# Patient Record
Sex: FEMALE | Race: WHITE | Employment: UNEMPLOYED | URBAN - METROPOLITAN AREA
[De-identification: names, ages, dates, MRNs, and addresses within clinical notes are randomized per-mention and may not be internally consistent; named-entity substitution may affect disease eponyms.]

---

## 2018-04-02 ENCOUNTER — HOSPITAL ENCOUNTER (OUTPATIENT)
Dept: BONE DENSITY | Age: 83
Discharge: HOME | End: 2018-04-02
Attending: FAMILY MEDICINE
Payer: MEDICARE

## 2018-04-02 DIAGNOSIS — M81.0 SENILE OSTEOPOROSIS: ICD-10-CM

## 2018-04-02 DIAGNOSIS — Z78.0 POST-MENOPAUSE: ICD-10-CM

## 2018-04-02 PROCEDURE — 77080 DXA BONE DENSITY AXIAL: CPT

## 2019-03-28 ENCOUNTER — HOSPITAL ENCOUNTER (OUTPATIENT)
Dept: GENERAL RADIOLOGY | Age: 84
Discharge: HOME | End: 2019-03-28
Payer: MEDICARE

## 2019-03-28 DIAGNOSIS — M17.10 ARTHRITIS OF KNEE: ICD-10-CM

## 2019-03-28 PROCEDURE — 73564 X-RAY EXAM KNEE 4 OR MORE: CPT

## 2019-03-31 ENCOUNTER — HOSPITAL ENCOUNTER (INPATIENT)
Age: 84
LOS: 4 days | Discharge: NURSING, REHAB OR LONG-TERM CARE | DRG: 690 | End: 2019-04-05
Attending: EMERGENCY MEDICINE | Admitting: FAMILY MEDICINE
Payer: MEDICARE

## 2019-03-31 DIAGNOSIS — N30.00 ACUTE CYSTITIS WITHOUT HEMATURIA: ICD-10-CM

## 2019-03-31 DIAGNOSIS — T79.6XXA TRAUMATIC RHABDOMYOLYSIS, INITIAL ENCOUNTER (HCC): Primary | ICD-10-CM

## 2019-03-31 LAB
ALBUMIN SERPL-MCNC: 3.2 G/DL (ref 3.4–5)
ALBUMIN/GLOB SERPL: 1 {RATIO} (ref 0.8–1.7)
ALP SERPL-CCNC: 70 U/L (ref 45–117)
ALT SERPL-CCNC: 60 U/L (ref 13–56)
ANION GAP SERPL CALC-SCNC: 9 MMOL/L (ref 3–18)
AST SERPL-CCNC: 173 U/L (ref 15–37)
BASOPHILS # BLD: 0 K/UL (ref 0–0.1)
BASOPHILS NFR BLD: 0 % (ref 0–2)
BILIRUB SERPL-MCNC: 0.8 MG/DL (ref 0.2–1)
BUN SERPL-MCNC: 37 MG/DL (ref 7–18)
BUN/CREAT SERPL: 28 (ref 12–20)
CALCIUM SERPL-MCNC: 8.9 MG/DL (ref 8.5–10.1)
CHLORIDE SERPL-SCNC: 102 MMOL/L (ref 100–108)
CK MB CFR SERPL CALC: 0.7 % (ref 0–4)
CK MB SERPL-MCNC: 41.1 NG/ML (ref 5–25)
CK SERPL-CCNC: 5595 U/L (ref 26–192)
CO2 SERPL-SCNC: 27 MMOL/L (ref 21–32)
CREAT SERPL-MCNC: 1.3 MG/DL (ref 0.6–1.3)
DIFFERENTIAL METHOD BLD: ABNORMAL
EOSINOPHIL # BLD: 0 K/UL (ref 0–0.4)
EOSINOPHIL NFR BLD: 0 % (ref 0–5)
ERYTHROCYTE [DISTWIDTH] IN BLOOD BY AUTOMATED COUNT: 13.1 % (ref 11.6–14.5)
GLOBULIN SER CALC-MCNC: 3.1 G/DL (ref 2–4)
GLUCOSE SERPL-MCNC: 147 MG/DL (ref 74–99)
HCT VFR BLD AUTO: 38.8 % (ref 35–45)
HGB BLD-MCNC: 13.1 G/DL (ref 12–16)
LYMPHOCYTES # BLD: 1.3 K/UL (ref 0.9–3.6)
LYMPHOCYTES NFR BLD: 9 % (ref 21–52)
MCH RBC QN AUTO: 30.8 PG (ref 24–34)
MCHC RBC AUTO-ENTMCNC: 33.8 G/DL (ref 31–37)
MCV RBC AUTO: 91.1 FL (ref 74–97)
MONOCYTES # BLD: 1.2 K/UL (ref 0.05–1.2)
MONOCYTES NFR BLD: 9 % (ref 3–10)
NEUTS SEG # BLD: 11.1 K/UL (ref 1.8–8)
NEUTS SEG NFR BLD: 82 % (ref 40–73)
PLATELET # BLD AUTO: 251 K/UL (ref 135–420)
PMV BLD AUTO: 9.7 FL (ref 9.2–11.8)
POTASSIUM SERPL-SCNC: 3.3 MMOL/L (ref 3.5–5.5)
PROT SERPL-MCNC: 6.3 G/DL (ref 6.4–8.2)
RBC # BLD AUTO: 4.26 M/UL (ref 4.2–5.3)
SODIUM SERPL-SCNC: 138 MMOL/L (ref 136–145)
TROPONIN I SERPL-MCNC: 0.08 NG/ML (ref 0–0.04)
WBC # BLD AUTO: 13.6 K/UL (ref 4.6–13.2)

## 2019-03-31 PROCEDURE — 87086 URINE CULTURE/COLONY COUNT: CPT

## 2019-03-31 PROCEDURE — 87186 SC STD MICRODIL/AGAR DIL: CPT

## 2019-03-31 PROCEDURE — 81001 URINALYSIS AUTO W/SCOPE: CPT

## 2019-03-31 PROCEDURE — 99285 EMERGENCY DEPT VISIT HI MDM: CPT

## 2019-03-31 PROCEDURE — 74011250637 HC RX REV CODE- 250/637: Performed by: EMERGENCY MEDICINE

## 2019-03-31 PROCEDURE — 85025 COMPLETE CBC W/AUTO DIFF WBC: CPT

## 2019-03-31 PROCEDURE — 82550 ASSAY OF CK (CPK): CPT

## 2019-03-31 PROCEDURE — 93005 ELECTROCARDIOGRAM TRACING: CPT

## 2019-03-31 PROCEDURE — 80053 COMPREHEN METABOLIC PANEL: CPT

## 2019-03-31 PROCEDURE — 87077 CULTURE AEROBIC IDENTIFY: CPT

## 2019-03-31 RX ORDER — ACETAMINOPHEN 325 MG/1
650 TABLET ORAL ONCE
Status: COMPLETED | OUTPATIENT
Start: 2019-03-31 | End: 2019-03-31

## 2019-03-31 RX ADMIN — ACETAMINOPHEN 650 MG: 325 TABLET ORAL at 23:43

## 2019-04-01 PROBLEM — M62.82 RHABDOMYOLYSIS: Status: ACTIVE | Noted: 2019-04-01

## 2019-04-01 LAB
APPEARANCE UR: ABNORMAL
ATRIAL RATE: 76 BPM
BACTERIA URNS QL MICRO: ABNORMAL /HPF
BILIRUB UR QL: NEGATIVE
CALCULATED P AXIS, ECG09: 49 DEGREES
CALCULATED R AXIS, ECG10: 57 DEGREES
CALCULATED T AXIS, ECG11: 21 DEGREES
CK MB CFR SERPL CALC: 0.6 % (ref 0–4)
CK MB CFR SERPL CALC: 0.8 % (ref 0–4)
CK MB SERPL-MCNC: 24.6 NG/ML (ref 5–25)
CK MB SERPL-MCNC: 30.8 NG/ML (ref 5–25)
CK SERPL-CCNC: 3276 U/L (ref 26–192)
CK SERPL-CCNC: 5595 U/L (ref 26–192)
COLOR UR: ABNORMAL
DIAGNOSIS, 93000: NORMAL
EPITH CASTS URNS QL MICRO: ABNORMAL /LPF (ref 0–5)
GLUCOSE UR STRIP.AUTO-MCNC: NEGATIVE MG/DL
HGB UR QL STRIP: ABNORMAL
KETONES UR QL STRIP.AUTO: ABNORMAL MG/DL
LACTATE BLD-SCNC: 0.87 MMOL/L (ref 0.4–2)
LACTATE SERPL-SCNC: 1.2 MMOL/L (ref 0.4–2)
LEUKOCYTE ESTERASE UR QL STRIP.AUTO: ABNORMAL
NITRITE UR QL STRIP.AUTO: NEGATIVE
P-R INTERVAL, ECG05: 182 MS
PH UR STRIP: 5.5 [PH] (ref 5–8)
PROT UR STRIP-MCNC: 30 MG/DL
Q-T INTERVAL, ECG07: 370 MS
QRS DURATION, ECG06: 78 MS
QTC CALCULATION (BEZET), ECG08: 416 MS
RBC #/AREA URNS HPF: ABNORMAL /HPF (ref 0–5)
SP GR UR REFRACTOMETRY: 1.03 (ref 1–1.03)
TROPONIN I SERPL-MCNC: 0.08 NG/ML (ref 0–0.04)
TROPONIN I SERPL-MCNC: 0.08 NG/ML (ref 0–0.04)
UROBILINOGEN UR QL STRIP.AUTO: 1 EU/DL (ref 0.2–1)
VENTRICULAR RATE, ECG03: 76 BPM
WBC URNS QL MICRO: ABNORMAL /HPF (ref 0–4)

## 2019-04-01 PROCEDURE — 36415 COLL VENOUS BLD VENIPUNCTURE: CPT

## 2019-04-01 PROCEDURE — 74011250636 HC RX REV CODE- 250/636: Performed by: STUDENT IN AN ORGANIZED HEALTH CARE EDUCATION/TRAINING PROGRAM

## 2019-04-01 PROCEDURE — 65660000000 HC RM CCU STEPDOWN

## 2019-04-01 PROCEDURE — 96361 HYDRATE IV INFUSION ADD-ON: CPT

## 2019-04-01 PROCEDURE — 96374 THER/PROPH/DIAG INJ IV PUSH: CPT

## 2019-04-01 PROCEDURE — 87040 BLOOD CULTURE FOR BACTERIA: CPT

## 2019-04-01 PROCEDURE — 83605 ASSAY OF LACTIC ACID: CPT

## 2019-04-01 PROCEDURE — 74011250636 HC RX REV CODE- 250/636: Performed by: EMERGENCY MEDICINE

## 2019-04-01 PROCEDURE — 74011000250 HC RX REV CODE- 250: Performed by: EMERGENCY MEDICINE

## 2019-04-01 PROCEDURE — 82550 ASSAY OF CK (CPK): CPT

## 2019-04-01 RX ORDER — HEPARIN SODIUM 5000 [USP'U]/ML
5000 INJECTION, SOLUTION INTRAVENOUS; SUBCUTANEOUS EVERY 8 HOURS
Status: DISCONTINUED | OUTPATIENT
Start: 2019-04-01 | End: 2019-04-05 | Stop reason: HOSPADM

## 2019-04-01 RX ORDER — SODIUM CHLORIDE 9 MG/ML
125 INJECTION, SOLUTION INTRAVENOUS CONTINUOUS
Status: DISPENSED | OUTPATIENT
Start: 2019-04-01 | End: 2019-04-02

## 2019-04-01 RX ORDER — ACETAMINOPHEN 325 MG/1
650 TABLET ORAL
Status: DISCONTINUED | OUTPATIENT
Start: 2019-04-01 | End: 2019-04-05 | Stop reason: HOSPADM

## 2019-04-01 RX ORDER — SODIUM CHLORIDE 0.9 % (FLUSH) 0.9 %
5-10 SYRINGE (ML) INJECTION AS NEEDED
Status: DISCONTINUED | OUTPATIENT
Start: 2019-04-01 | End: 2019-04-05 | Stop reason: HOSPADM

## 2019-04-01 RX ADMIN — HEPARIN SODIUM 5000 UNITS: 5000 INJECTION INTRAVENOUS; SUBCUTANEOUS at 17:38

## 2019-04-01 RX ADMIN — SODIUM CHLORIDE 1000 ML: 900 INJECTION, SOLUTION INTRAVENOUS at 04:15

## 2019-04-01 RX ADMIN — WATER 1 G: 1 INJECTION INTRAMUSCULAR; INTRAVENOUS; SUBCUTANEOUS at 01:15

## 2019-04-01 RX ADMIN — SODIUM CHLORIDE 125 ML/HR: 900 INJECTION, SOLUTION INTRAVENOUS at 20:26

## 2019-04-01 RX ADMIN — SODIUM CHLORIDE 1000 ML: 900 INJECTION, SOLUTION INTRAVENOUS at 07:29

## 2019-04-01 RX ADMIN — SODIUM CHLORIDE 448 ML: 900 INJECTION, SOLUTION INTRAVENOUS at 11:24

## 2019-04-01 RX ADMIN — SODIUM CHLORIDE 1000 ML: 900 INJECTION, SOLUTION INTRAVENOUS at 10:19

## 2019-04-01 RX ADMIN — SODIUM CHLORIDE 1000 ML: 900 INJECTION, SOLUTION INTRAVENOUS at 01:00

## 2019-04-01 NOTE — ED NOTES
Patient assisted to bedside commode,  Pt needs major assist with standing. And turning. Pt returned to stretcher after using bedside commode

## 2019-04-01 NOTE — ED PROVIDER NOTES
EMERGENCY DEPARTMENT HISTORY AND PHYSICAL EXAM 
 
9:58 PM 
Date: 3/31/2019 Patient Name: Veronica Crowley History of Presenting Illness Chief Complaint Patient presents with  Fall History Provided By: Patient and Patient's Daughter HPI: Veronica Crowley is a 80 y.o. female with history of hypertension, occasional PVCs, here for frequent falls. Patient had a fall yesterday and was on the ground from 9 AM to 8 PM, was found by her daughter. Patient states that she tripped over a hole in the ground, and had no prior chest pain, shortness of breath, palpitations. Currently she is asymptomatic, and her daughter had given her lots of fluids yesterday so she had been urinating frequently. PCP: Trinh Hobson MD 
No current facility-administered medications on file prior to encounter. Current Outpatient Medications on File Prior to Encounter Medication Sig Dispense Refill  atorvastatin (LIPITOR) 20 mg tablet Take 20 mg by mouth daily.  aspirin delayed-release 81 mg tablet Take  by mouth daily.  raloxifene (EVISTA) 60 mg tablet Take  by mouth daily.  losartan-hydrochlorothiazide (HYZAAR) 50-12.5 mg per tablet Take 1 Tab by mouth daily. Past History Past Medical History: 
Past Medical History:  
Diagnosis Date  Essential hypertension  Hyperlipidemia  Other premature beats 5/13/2013  
  3.  11,437 single ve's, 16 paired. Bigeminy and trigeminy noted. Approximately 15% o pvc's on ekg. asymptomatic potassium normal check echo for lv function Past Surgical History: 
History reviewed. No pertinent surgical history. Family History: 
Family History Problem Relation Age of Onset  Arrhythmia Sister  Heart Attack Other [facility] Heart Surgery Other Social History: 
Social History Tobacco Use  Smoking status: Never Smoker Substance Use Topics  Alcohol use: No  
 Drug use: No  
 
 
Allergies: 
No Known Allergies Review of Systems Constitutional: No fevers. Eyes: No visual symptoms. ENT: No sore throat, runny nose, or ear pain. Respiratory: No cough, dyspnea, or wheezing. Cardiovascular: No chest pain, palpitations, or heaviness. Gastrointestinal: No nausea, vomiting, diarrhea. Genitourinary: No dysuria, frequency, or urgency. Musculoskeletal: No joint pain or swelling. Integumentary: No rashes. Neurological: No headaches, sensory or motor symptoms. All other systems negative. Physical Exam  
 
Patient Vitals for the past 12 hrs: 
 Temp Pulse Resp BP SpO2  
04/01/19 0600  62 16  94 % 04/01/19 0500  75 21 (!) 143/100 96 % 04/01/19 0400  63 17 133/59 94 % 04/01/19 0300  83 19 150/71 93 % 04/01/19 0245  93 24 (!) 131/104 94 % 04/01/19 0230  70 18 136/66 93 % 04/01/19 0215  76 18 128/77 96 % 04/01/19 0200  76 16 129/54 97 % 04/01/19 0115  74 21 145/86 96 % 04/01/19 0100  69 16 (!) 117/94 96 % 04/01/19 0045  75 19 154/68 95 % 04/01/19 0030  77 20 146/74 94 % 04/01/19 0015  75 16 (!) 148/125 96 % 04/01/19 0000    146/66   
03/31/19 2230  75 19 114/89 94 % 03/31/19 2215  81 19 144/73 95 % 03/31/19 2200  82 23 146/64 96 % 03/31/19 2145  78 19 116/61 95 % 03/31/19 2130  80 21 118/74 94 % 03/31/19 2115  81 21 (!) 111/97 95 % 03/31/19 2107 99.1 °F (37.3 °C) 85 24 138/53 96 % Physical Exam  
Constitutional: Appears well-developed. Is not diaphoretic. Eyes: Conjunctiva clear, EOMI Head: Normocephalic and atraumatic. Neck: Normal range of motion. No stridor or tracheal deviation. Cardiovascular: Intact distal pulses. Pulmonary/Chest: Effort normal and no respiratory distress. Abdominal: Non distended. Musculoskeletal: Normal range of motion. Exhibits no tenderness. Neurological: Conversant, alert. Skin: Skin is warm and dry. Psychiatric: Has a normal mood and affect. Behavior is normal.  
Nursing note and vitals reviewed. Diagnostic Study Results Labs - Recent Results (from the past 12 hour(s)) EKG, 12 LEAD, INITIAL Collection Time: 03/31/19 10:03 PM  
Result Value Ref Range Ventricular Rate 76 BPM  
 Atrial Rate 76 BPM  
 P-R Interval 182 ms QRS Duration 78 ms Q-T Interval 370 ms QTC Calculation (Bezet) 416 ms Calculated P Axis 49 degrees Calculated R Axis 57 degrees Calculated T Axis 21 degrees Diagnosis Normal sinus rhythm Possible Left atrial enlargement RSR' or QR pattern in V1 suggests right ventricular conduction delay T wave abnormality, consider anterior ischemia Abnormal ECG When compared with ECG of 25-MAR-2013 12:18, 
Previous ECG has undetermined rhythm, needs review T wave inversion now evident in Anterior leads CBC WITH AUTOMATED DIFF Collection Time: 03/31/19 10:36 PM  
Result Value Ref Range WBC 13.6 (H) 4.6 - 13.2 K/uL  
 RBC 4.26 4.20 - 5.30 M/uL  
 HGB 13.1 12.0 - 16.0 g/dL HCT 38.8 35.0 - 45.0 % MCV 91.1 74.0 - 97.0 FL  
 MCH 30.8 24.0 - 34.0 PG  
 MCHC 33.8 31.0 - 37.0 g/dL  
 RDW 13.1 11.6 - 14.5 % PLATELET 116 520 - 432 K/uL MPV 9.7 9.2 - 11.8 FL  
 NEUTROPHILS 82 (H) 40 - 73 % LYMPHOCYTES 9 (L) 21 - 52 % MONOCYTES 9 3 - 10 % EOSINOPHILS 0 0 - 5 % BASOPHILS 0 0 - 2 %  
 ABS. NEUTROPHILS 11.1 (H) 1.8 - 8.0 K/UL  
 ABS. LYMPHOCYTES 1.3 0.9 - 3.6 K/UL  
 ABS. MONOCYTES 1.2 0.05 - 1.2 K/UL  
 ABS. EOSINOPHILS 0.0 0.0 - 0.4 K/UL  
 ABS. BASOPHILS 0.0 0.0 - 0.1 K/UL  
 DF AUTOMATED METABOLIC PANEL, COMPREHENSIVE Collection Time: 03/31/19 10:36 PM  
Result Value Ref Range Sodium 138 136 - 145 mmol/L Potassium 3.3 (L) 3.5 - 5.5 mmol/L Chloride 102 100 - 108 mmol/L  
 CO2 27 21 - 32 mmol/L Anion gap 9 3.0 - 18 mmol/L Glucose 147 (H) 74 - 99 mg/dL BUN 37 (H) 7.0 - 18 MG/DL Creatinine 1.30 0.6 - 1.3 MG/DL  
 BUN/Creatinine ratio 28 (H) 12 - 20 GFR est AA 47 (L) >60 ml/min/1.73m2 GFR est non-AA 39 (L) >60 ml/min/1.73m2 Calcium 8.9 8.5 - 10.1 MG/DL Bilirubin, total 0.8 0.2 - 1.0 MG/DL  
 ALT (SGPT) 60 (H) 13 - 56 U/L  
 AST (SGOT) 173 (H) 15 - 37 U/L Alk. phosphatase 70 45 - 117 U/L Protein, total 6.3 (L) 6.4 - 8.2 g/dL Albumin 3.2 (L) 3.4 - 5.0 g/dL Globulin 3.1 2.0 - 4.0 g/dL A-G Ratio 1.0 0.8 - 1.7 CARDIAC PANEL,(CK, CKMB & TROPONIN) Collection Time: 03/31/19 10:36 PM  
Result Value Ref Range CK 5,595 (H) 26 - 192 U/L  
 CK - MB 41.1 (H) <3.6 ng/ml CK-MB Index 0.7 0.0 - 4.0 % Troponin-I, QT 0.08 (H) 0.0 - 0.045 NG/ML  
URINALYSIS W/ RFLX MICROSCOPIC Collection Time: 03/31/19 11:53 PM  
Result Value Ref Range Color DARK YELLOW Appearance TURBID Specific gravity 1.030 1.005 - 1.030    
 pH (UA) 5.5 5.0 - 8.0 Protein 30 (A) NEG mg/dL Glucose NEGATIVE  NEG mg/dL Ketone TRACE (A) NEG mg/dL Bilirubin NEGATIVE  NEG Blood MODERATE (A) NEG Urobilinogen 1.0 0.2 - 1.0 EU/dL Nitrites NEGATIVE  NEG Leukocyte Esterase SMALL (A) NEG URINE MICROSCOPIC ONLY Collection Time: 03/31/19 11:53 PM  
Result Value Ref Range WBC 30 to 40 0 - 4 /hpf  
 RBC 5 to 9 0 - 5 /hpf Epithelial cells 2+ 0 - 5 /lpf Bacteria 4+ (A) NEG /hpf CARDIAC PANEL,(CK, CKMB & TROPONIN) Collection Time: 04/01/19  3:00 AM  
Result Value Ref Range CK 5,595 (H) 26 - 192 U/L  
 CK - MB 30.8 (H) <3.6 ng/ml CK-MB Index 0.6 0.0 - 4.0 % Troponin-I, QT 0.08 (H) 0.0 - 0.045 NG/ML Radiologic Studies - No orders to display CT Results  (Last 48 hours) None CXR Results  (Last 48 hours) None Medical Decision Making ED Course: Progress Notes, Reevaluation, and Consults: 
6:40 a. m. patient meeting several sirs criteria now so we will activate sepsis bundle. Pt had already rec ceftriaxone for simple UTI. Will cont to hydrate her rhabdomyolysis with generous fluids. 6:45 AMspoke with Dr. Newton Osman who accepts the patient, will call the resident next. 7 AMspoke with [facility] residents who will come evaluate patient for admission. Nursing staff to update daughter about disposition. Provider Notes (Medical Decision Making): Patient fell couple days ago and was stuck on the ground from 9 AM to 8 PM roughly, and now has rhabdomyolysis with elevated CK and troponin. Despite hydration, repeat was exactly the same, and I have verified with the lab that this is indeed 2 separate lab samples and not the same. Given the lack of response to hydration, we will admit for further hydration and repeat CK. Critical Care Time: Critical Care Time: The services I provided to this patient were to treat and/or prevent clinically significant deterioration that could result in the failure of one or more body systems and/or organ systems due to rhabdomyolysis. Services included the following: 
-reviewing nursing notes and old charts 
-vital sign assessments 
-direct patient care 
-medication orders and management 
-interpreting and reviewing diagnostic studies/labs 
-re-evaluations 
-documentation time Aggregate critical care time was 35  minutes, which includes only time during which I was engaged in work directly related to the patient's care as described above, whether I was at bedside or elsewhere in the Emergency Department. It did not include time spent performing other reported procedures or the services of residents, students, nurses, or advance practice providers. Jolene Menezes MD 
 
6:47 AM 
 
 
Current Facility-Administered Medications Medication Dose Route Frequency Provider Last Rate Last Dose  sodium chloride (NS) flush 5-10 mL  5-10 mL IntraVENous PRN Jolene Menezes MD      
 sodium chloride 0.9 % bolus infusion 1,000 mL  1,000 mL IntraVENous Jodi Le MD      
 Followed by  
Laci Smith sodium chloride 0.9 % bolus infusion 1,000 mL  1,000 mL IntraVENous ONCE Chau Corebtt MD      
 Followed by  
[address] [place] [address] sodium chloride 0.9 % bolus infusion 448 mL  448 mL IntraVENous Cherrie De La Cruz MD      
 
Current Outpatient Medications Medication Sig Dispense Refill  atorvastatin (LIPITOR) 20 mg tablet Take 20 mg by mouth daily.  aspirin delayed-release 81 mg tablet Take  by mouth daily.  raloxifene (EVISTA) 60 mg tablet Take  by mouth daily.  losartan-hydrochlorothiazide (HYZAAR) 50-12.5 mg per tablet Take 1 Tab by mouth daily. Vital Signs-Reviewed the patient's vital signs. EKG: Interpreted by the EP. Time Interpreted: 4790 Rate: 76 Rhythm: Sinus Interpretation: RSR pattern, nonspecific STs,  Records Reviewed: Nursing Notes and Old Medical Records (Time of Review: 9:58 PM) 
-I am the first provider for this patient. 
-I reviewed the vital signs, available nursing notes, past medical history, past surgical history, family history and social history. Diagnosis Clinical Impression: 1. Traumatic rhabdomyolysis, initial encounter ([facility] [address] 2. Acute cystitis without hematuria Disposition: Admit

## 2019-04-01 NOTE — ED TRIAGE NOTES
Patient brought in by medic for repeated falls. Patient alert and orientated,  States she stepped in hole yesterday while hanging out clothes. Pt denies being lightheaded or dizzy,  Pt denies any pain except her usual in her knees. Daughter states pt sent here for bloodwork and x rays

## 2019-04-01 NOTE — PROGRESS NOTES
Reason for Admission:  Rhabdomyolysis [M62.82] RRAT Score:    8 Plan for utilizing home health: To be determined Likelihood of Readmission:   LOW Transition of Care Plan:         
 
 
Initial assessment completed with patient and her daughter Will Safer [phone]. Cognitive status of patient: oriented to time, place, person and situation. Face sheet information confirmed:  yes. The patient designates her daughter Will Safer to participate in her discharge plan and to receive any needed information. This patient lives in a single family home alone. Her daughter Janeth Lagunas lives next door. Patient is able to navigate steps as needed. Prior to hospitalization, patient was considered to be independent with ADLs : yes . If not independent,  patient needs assist with : food preparation and cooking and IADLs Patient has a current ACP document on file: no The patient daughter will be available to transport patient home upon discharge. The patient already has [facility], [facility], and pt's daughter stated she has ordered  Medical alert for pt. Patient is not currently active with home health. Patient has not stayed in a skilled nursing facility or rehab. This patient is on dialysis :no Currently, the discharge plan is to be determined The patient's daughter states that she obtains pt's  medications from the pharmacy, and pt can  take her medications as directed. Patient's current insurance is [employer] Care Management Interventions PCP Verified by CM: Yes 
Palliative Care Criteria Met (RRAT>21 & CHF Dx)?: No 
Mode of Transport at Discharge: Other (see comment) Transition of Care Consult (CM Consult): Discharge Planning Physical Therapy Consult: No 
Occupational Therapy Consult: No 
Speech Therapy Consult: No 
Current Support Network: Lives Alone, Family Lives [place] Confirm Follow Up Transport: Family Plan discussed with Pt/Family/Caregiver: Yes Discharge Location Discharge Placement: Unable to determine at this time Discharge Planning: 
Per pt's daughter she fell at home and could not get up. Pt will need PT/OT eval and Case Management will continue to follow to ensure a safe discharge. YOVANA Elizabeth RN Care Management Pager: [phone]

## 2019-04-01 NOTE — H&P
Admission History and Physical 
[facility] Patient: Ira Thorne MRN: [id number]  [facility]: [id number] YOB: 1931  Age: 80 y.o. Sex: female DOA: 3/31/2019 HPI:  
 
Ira Thorne is a 80 y.o. female with PMH of HTN, osteoarthritis, now presenting with complaint of fall. On saturday, Mrs. Karen Montero reports being outside and falling after tripping in a hole in her yard. She is poor historian when describing the fall. She states she was unable to get up because of pain in her knee due to her osteoarthritis. Mrs. Kaern Montero crawled to her house however was unable to reach a telephone to call anyone. Mrs. Nino daughter called a neighbor when she didn't hear from her mother and the neighbor went to check on her and found the patient down. Mrs. Karen Montero denies any head trauma, headache, neck pain, or back pain. She is usually ambulatory with assistance of a cane which she seldom uses. She denied any chest pain, shortness of breath, or palpitations surrounding the fall. She denies any recent changes to her medications. She is not reporting any significant pain. ED Course: In the ED, EKG was NSR, CK elevated to 5,900 and troponins to 0.08. Repeat labs were unchanged. Patients cbc and cmp otherwise unremarkable. UA concerning for UTI patient started on ceftriaxone. Mrs. Karen Montero was given 4,400 cc fluid in the ED. Review of Systems Constitutional: Negative for fever, chills HENT: Negative for hearing loss and sore throat. Eyes: Negative for blurred vision and double vision. Respiratory: Negative for cough and hemoptysis. Cardiovascular: Negative for chest pain and palpitations. Gastrointestinal: Negative for nausea and vomiting. Genitourinary: Negative for dysuria and hematuria. Musculoskeletal: Positive for pain in her knees bilaterally Skin: Negative for itching and rash. Neurological: Negative for dizziness and headaches. Psychiatric: Negative for depression and suicidal ideas. Past Medical History:  
Diagnosis Date  Essential hypertension  Hyperlipidemia  Other premature beats 5/13/2013  
  3.  11,437 single ve's, 16 paired. Bigeminy and trigeminy noted. Approximately 15% o pvc's on ekg. asymptomatic potassium normal check echo for lv function History reviewed. No pertinent surgical history. Family History Problem Relation Age of Onset  Arrhythmia Sister  Heart Attack Other [facility] Other Social History Socioeconomic History  Marital status:  Spouse name: Not on file  Number of children: Not on file  Years of education: Not on file  Highest education level: Not on file Tobacco Use  Smoking status: Never Smoker Substance and Sexual Activity  Alcohol use: No  
 Drug use: No  
 Sexual activity: Never No Known Allergies Prior to Admission Medications Prescriptions Last Dose Informant Patient Reported? Taking?  
aspirin delayed-release 81 mg tablet   Yes No  
Sig: Take  by mouth daily. atorvastatin (LIPITOR) 20 mg tablet   Yes No  
Sig: Take 20 mg by mouth daily. losartan-hydrochlorothiazide (HYZAAR) 50-12.5 mg per tablet   Yes No  
Sig: Take 1 Tab by mouth daily. raloxifene (EVISTA) 60 mg tablet   Yes No  
Sig: Take  by mouth daily. Facility-Administered Medications: None Physical Exam:  
 
Patient Vitals for the past 24 hrs: 
 Temp Pulse Resp BP SpO2  
04/01/19 0800  62 21 129/79 95 % 04/01/19 0600  62 16  94 % 04/01/19 0500  75 21 (!) 143/100 96 % 04/01/19 0400  63 17 133/59 94 % 04/01/19 0300  83 19 150/71 93 % 04/01/19 0245  93 24 (!) 131/104 94 % 04/01/19 0230  70 18 136/66 93 % 04/01/19 0215  76 18 128/77 96 % 04/01/19 0200  76 16 129/54 97 % 04/01/19 0115  74 21 145/86 96 % 04/01/19 0100  69 16 (!) 117/94 96 % 04/01/19 0045  75 19 154/68 95 % 04/01/19 0030  77 20 146/74 94 % 04/01/19 0015  75 16 (!) 148/125 96 % 04/01/19 0000    146/66   
03/31/19 2230  75 19 114/89 94 % 03/31/19 2215  81 19 144/73 95 % 03/31/19 2200  82 23 146/64 96 % 03/31/19 2145  78 19 116/61 95 % 03/31/19 2130  80 21 118/74 94 % 03/31/19 2115  81 21 (!) 111/97 95 % 03/31/19 2107 99.1 °F (37.3 °C) 85 24 138/53 96 % Physical Exam:  
General:  Alert and Responsive and in No acute distress. HEENT: Conjunctiva pink, sclera anicteric. EOMI. Pharynx moist, nonerythematous. Moist mucous membranes. Thyroid not enlarged, no nodules. No cervical, supraclavicular, or submandibular lymphadenopathy. No cervical spine tenderness, normal ROM in neck CV:  RRR. No murmurs, rubs, or gallops appreciated. RESP:  Unlabored breathing. Lungs clear to auscultation. No wheeze, rales, or rhonchi. ABD:  Soft, nontender, nondistended. Normoactive bowel sounds. No hepatosplenomegaly. MS:  Pain with passive ROM of right knee with flexion, knees non-erythematous, no crepitus, they were not warm Neuro:  5/5 strength bilateral upper extremities and lower extremities. Grossly moving upper and lower extremities, CN 2-12 intact Ext:  No edema. 2+ radial and dp pulses bilaterally. Skin:  No rashes, lesions, or ulcers. Good turgor. IMAGING: No results found. Recent Results (from the past 24 hour(s)) EKG, 12 LEAD, INITIAL Collection Time: 03/31/19 10:03 PM  
Result Value Ref Range Ventricular Rate 76 BPM  
 Atrial Rate 76 BPM  
 P-R Interval 182 ms QRS Duration 78 ms Q-T Interval 370 ms QTC Calculation (Bezet) 416 ms Calculated P Axis 49 degrees Calculated R Axis 57 degrees Calculated T Axis 21 degrees Diagnosis Normal sinus rhythm Possible Left atrial enlargement RSR' or QR pattern in V1 suggests right ventricular conduction delay T wave abnormality, consider anterior ischemia Abnormal ECG 
 When compared with ECG of 25-MAR-2013 12:18, 
Previous ECG has undetermined rhythm, needs review T wave inversion now evident in Anterior leads CBC WITH AUTOMATED DIFF Collection Time: 03/31/19 10:36 PM  
Result Value Ref Range WBC 13.6 (H) 4.6 - 13.2 K/uL  
 RBC 4.26 4.20 - 5.30 M/uL  
 HGB 13.1 12.0 - 16.0 g/dL HCT 38.8 35.0 - 45.0 % MCV 91.1 74.0 - 97.0 FL  
 MCH 30.8 24.0 - 34.0 PG  
 MCHC 33.8 31.0 - 37.0 g/dL  
 RDW 13.1 11.6 - 14.5 % PLATELET 675 484 - 073 K/uL MPV 9.7 9.2 - 11.8 FL  
 NEUTROPHILS 82 (H) 40 - 73 % LYMPHOCYTES 9 (L) 21 - 52 % MONOCYTES 9 3 - 10 % EOSINOPHILS 0 0 - 5 % BASOPHILS 0 0 - 2 %  
 ABS. NEUTROPHILS 11.1 (H) 1.8 - 8.0 K/UL  
 ABS. LYMPHOCYTES 1.3 0.9 - 3.6 K/UL  
 ABS. MONOCYTES 1.2 0.05 - 1.2 K/UL  
 ABS. EOSINOPHILS 0.0 0.0 - 0.4 K/UL  
 ABS. BASOPHILS 0.0 0.0 - 0.1 K/UL  
 DF AUTOMATED METABOLIC PANEL, COMPREHENSIVE Collection Time: 03/31/19 10:36 PM  
Result Value Ref Range Sodium 138 136 - 145 mmol/L Potassium 3.3 (L) 3.5 - 5.5 mmol/L Chloride 102 100 - 108 mmol/L  
 CO2 27 21 - 32 mmol/L Anion gap 9 3.0 - 18 mmol/L Glucose 147 (H) 74 - 99 mg/dL BUN 37 (H) 7.0 - 18 MG/DL Creatinine 1.30 0.6 - 1.3 MG/DL  
 BUN/Creatinine ratio 28 (H) 12 - 20 GFR est AA 47 (L) >60 ml/min/1.73m2 GFR est non-AA 39 (L) >60 ml/min/1.73m2 Calcium 8.9 8.5 - 10.1 MG/DL Bilirubin, total 0.8 0.2 - 1.0 MG/DL  
 ALT (SGPT) 60 (H) 13 - 56 U/L  
 AST (SGOT) 173 (H) 15 - 37 U/L Alk. phosphatase 70 45 - 117 U/L Protein, total 6.3 (L) 6.4 - 8.2 g/dL Albumin 3.2 (L) 3.4 - 5.0 g/dL Globulin 3.1 2.0 - 4.0 g/dL A-G Ratio 1.0 0.8 - 1.7 CARDIAC PANEL,(CK, CKMB & TROPONIN) Collection Time: 03/31/19 10:36 PM  
Result Value Ref Range CK 5,595 (H) 26 - 192 U/L  
 CK - MB 41.1 (H) <3.6 ng/ml CK-MB Index 0.7 0.0 - 4.0 %  Troponin-I, QT 0.08 (H) 0.0 - 0.045 NG/ML  
URINALYSIS W/ RFLX MICROSCOPIC  
 Collection Time: 03/31/19 11:53 PM  
Result Value Ref Range Color DARK YELLOW Appearance TURBID Specific gravity 1.030 1.005 - 1.030    
 pH (UA) 5.5 5.0 - 8.0 Protein 30 (A) NEG mg/dL Glucose NEGATIVE  NEG mg/dL Ketone TRACE (A) NEG mg/dL Bilirubin NEGATIVE  NEG Blood MODERATE (A) NEG Urobilinogen 1.0 0.2 - 1.0 EU/dL Nitrites NEGATIVE  NEG Leukocyte Esterase SMALL (A) NEG URINE MICROSCOPIC ONLY Collection Time: 03/31/19 11:53 PM  
Result Value Ref Range WBC 30 to 40 0 - 4 /hpf  
 RBC 5 to 9 0 - 5 /hpf Epithelial cells 2+ 0 - 5 /lpf Bacteria 4+ (A) NEG /hpf CARDIAC PANEL,(CK, CKMB & TROPONIN) Collection Time: 04/01/19  3:00 AM  
Result Value Ref Range CK 5,595 (H) 26 - 192 U/L  
 CK - MB 30.8 (H) <3.6 ng/ml CK-MB Index 0.6 0.0 - 4.0 % Troponin-I, QT 0.08 (H) 0.0 - 0.045 NG/ML  
POC LACTIC ACID Collection Time: 04/01/19  7:44 AM  
Result Value Ref Range Lactic Acid (POC) 0.87 0.40 - 2.00 mmol/L Assessment/Plan:  
80 y.o. female with PMH HTN, osteoarthritis, now admitted with fall and found down. Rhabdomyolysis s/p fall Patient was down for significant amount of time Friday approximately 9 am to 8 PM following a fall in her yard. Per pt and daughter, knee pain R>L likely cause for pt not being able to lift herself up. EKG in ED NSR negative for STEMI. Elevated CK to 5,595 with slight bump in troponins to 0.08 repeat labs were unchanged. Cr 1.3 on admission. UA positive for 30 protein, mod blood, small LE, and 4+ bacteria. Received approx 4,400 cc in ED. 
- admit to telemetry - trend cardiac panel  
- repeat BMP 
- continue maintenance fluids 125 cc/hr  
- repeat EKG  
- daily cbc, bmp  
- PT/OT, CM  
- fall precautions UTI Received ceftriaxone in ED.  
- continue ceftriaxone, deescalate pending cx  
- FU urine cx  
- daily cbc Hypertension/ HLD 
- hold home losartan-hctz-50-12.5 mf daily  
- hold simvastatin Diet: Renal  
DVT Prophylaxis: Sub Q heparin Code Status: Full Point of Contact: Alison Lloyd (daughter) [phone], EEGX [phone] Disposition and anticipated LOS: 2 midnights Ashley Lyon MD PGY-1 
[facility] Admission History and Physical 
[facility] 
  
  
Patient: Radha Dash MRN: [id number]  CSN: [id number] YOB: 1931  Age: 80 y.o. Sex: female   
  
DOA: 3/31/2019 HPI:  
  
Radha Dash is a 80 y.o. female with PMH arthritis, HTN,HLD now presenting with complaint of a fall. Patient reports that on Saturday she was washing her clothes and then drying them in her house when she fell. She said there was a hole in the ground that she fell into. She did not see it because it was covered. Patient reports she fell straight to the ground in a siting position. Denies any dizziness, blurry vision before the fall. Denies hitting her head, back,legs or arms, LOC, chest pain, SOB or palpitations. She fell in the morning and could not stand up to get into her house so she spent 12 hours until her daughter called a neighbor for help.  
  
Reports pain in her knees due to arthritis and problems walking and standing up.  
  
  
ED Course:  
-cardiac panel CK 5,595 
-CBC WBC 13.6 
-CMP k 3.3 
-Lactic acid 
-U/A leukocyte esterase  
-EKG 
-TYLENOL 
-4 L NS 
  
Review of Systems Constitutional: Negative for fever and malaise/fatigue. HENT: Negative for congestion and sore throat. Eyes: Negative for blurred vision. Respiratory: Negative for cough and shortness of breath. Cardiovascular: Negative for chest pain, palpitations and leg swelling. Gastrointestinal: Negative for abdominal pain, nausea and vomiting. Genitourinary: Negative for dysuria and hematuria. Musculoskeletal: Positive for falls and joint pain. Negative for back pain, myalgias and neck pain. Skin: Negative for rash. Neurological: Negative for dizziness, tremors, loss of consciousness, weakness and headaches.  
  
  
    
Past Medical History:  
Diagnosis Date  Essential hypertension    
 Hyperlipidemia    
 Other premature beats 5/13/2013  
   3.  11,437 single ve's, 16 paired. Bigeminy and trigeminy noted. Approximately 15% o pvc's on ekg. asymptomatic potassium normal check echo for lv function   
  
  
History reviewed. No pertinent surgical history. 
  
     
Family History Problem Relation Age of Onset  Arrhythmia Sister    
 Heart Attack Other    
 Heart Surgery Other    
  
  
Social History  
  
  
     
Socioeconomic History  Marital status:   
    Spouse name: Not on file  Number of children: Not on file  Years of education: Not on file  Highest education level: Not on file Tobacco Use  Smoking status: Never Smoker Substance and Sexual Activity  Alcohol use: No  
 Drug use: No  
 Sexual activity: Never  
  
  
  
No Known Allergies 
  
Prior to Admission Medications Prescriptions Last Dose Informant Patient Reported? Taking?  
aspirin delayed-release 81 mg tablet     Yes No  
Sig: Take  by mouth daily. atorvastatin (LIPITOR) 20 mg tablet     Yes No  
Sig: Take 20 mg by mouth daily. losartan-hydrochlorothiazide (HYZAAR) 50-12.5 mg per tablet     Yes No  
Sig: Take 1 Tab by mouth daily. raloxifene (EVISTA) 60 mg tablet     Yes No  
Sig: Take  by mouth daily. Facility-Administered Medications: None  
  
  
Physical Exam:  
  
Patient Vitals for the past 24 hrs: 
  Temp Pulse Resp BP SpO2  
04/01/19 0600  62 16  94 % 04/01/19 0500  75 21 (!) 143/100 96 % 04/01/19 0400  63 17 133/59 94 % 04/01/19 0300  83 19 150/71 93 % 04/01/19 0245  93 24 (!) 131/104 94 % 04/01/19 0230  70 18 136/66 93 % 04/01/19 0215  76 18 128/77 96 % 04/01/19 0200  76 16 129/54 97 % 04/01/19 0115  74 21 145/86 96 % 04/01/19 0100  69 16 (!) 117/94 96 % 04/01/19 0045  75 19 154/68 95 % 04/01/19 0030  77 20 146/74 94 % 04/01/19 0015  75 16 (!) 148/125 96 % 04/01/19 0000    146/66   
03/31/19 2230  75 19 114/89 94 % 03/31/19 2215  81 19 144/73 95 % 03/31/19 2200  82 23 146/64 96 % 03/31/19 2145  78 19 116/61 95 % 03/31/19 2130  80 21 118/74 94 % 03/31/19 2115  81 21 (!) 111/97 95 % 03/31/19 2107 99.1 °F (37.3 °C) 85 24 138/53 96 %   
  
Physical Exam  
Constitutional: She is oriented to person, place, and time. Vital signs are normal. She appears well-developed and well-nourished. She is cooperative. Non-toxic appearance. HENT:  
Head: Normocephalic and atraumatic. Nose: Nose normal.  
Mouth/Throat: Oropharynx is clear and moist. Mucous membranes are dry. Eyes: Pupils are equal, round, and reactive to light. Conjunctivae and EOM are normal.  
Neck: Normal range of motion and full passive range of motion without pain. No muscular tenderness present. No neck rigidity. Normal range of motion present. Cardiovascular: Normal rate, regular rhythm, S1 normal, S2 normal and normal heart sounds. Exam reveals no gallop. No murmur heard. Pulses: 
     Dorsalis pedis pulses are 2+ on the right side, and 2+ on the left side. Pulmonary/Chest: Effort normal. No respiratory distress. She has no wheezes. She has no rhonchi. She has no rales. Abdominal: Soft. Normal appearance and bowel sounds are normal. There is no tenderness. Musculoskeletal: She exhibits no edema. Right hip: She exhibits normal range of motion. Left hip: She exhibits normal range of motion. Right knee: She exhibits normal range of motion. No tenderness found. Left knee: She exhibits normal range of motion. No tenderness found. Feet:  
Right Foot:  
Skin Integrity: Positive for callus and dry skin. Negative for ulcer. Left Foot:  
Skin Integrity: Positive for callus and dry skin. Negative for ulcer. Lymphadenopathy: Head (right side): No submental, no submandibular, no tonsillar, no preauricular, no posterior auricular and no occipital adenopathy present. Head (left side): No submental, no submandibular, no tonsillar, no preauricular, no posterior auricular and no occipital adenopathy present. Neurological: She is alert and oriented to person, place, and time. She has normal strength. No cranial nerve deficit. Skin: Skin is warm and dry. Psychiatric: Her speech is normal and behavior is normal.  
  
  
IMAGING:  
  
Recent Results Recent Results (from the past 12 hour(s)) EKG, 12 LEAD, INITIAL  
  Collection Time: 03/31/19 10:03 PM  
Result Value Ref Range  
  Ventricular Rate 76 BPM  
  Atrial Rate 76 BPM  
  P-R Interval 182 ms  
  QRS Duration 78 ms  
  Q-T Interval 370 ms  
  QTC Calculation (Bezet) 416 ms  
  Calculated P Axis 49 degrees  
  Calculated R Axis 57 degrees  
  Calculated T Axis 21 degrees  
  Diagnosis      
    Normal sinus rhythm Possible Left atrial enlargement RSR' or QR pattern in V1 suggests right ventricular conduction delay T wave abnormality, consider anterior ischemia Abnormal ECG When compared with ECG of 25-MAR-2013 12:18, 
Previous ECG has undetermined rhythm, needs review T wave inversion now evident in Anterior leads 
   
CBC WITH AUTOMATED DIFF  
  Collection Time: 03/31/19 10:36 PM  
Result Value Ref Range  
  WBC 13.6 (H) 4.6 - 13.2 K/uL  
  RBC 4.26 4.20 - 5.30 M/uL  
  HGB 13.1 12.0 - 16.0 g/dL  
  HCT 38.8 35.0 - 45.0 %  
  MCV 91.1 74.0 - 97.0 FL  
  MCH 30.8 24.0 - 34.0 PG  
  MCHC 33.8 31.0 - 37.0 g/dL  
  RDW 13.1 11.6 - 14.5 %  
  PLATELET 712 013 - 245 K/uL  
  MPV 9.7 9.2 - 11.8 FL  
  NEUTROPHILS 82 (H) 40 - 73 %  
  LYMPHOCYTES 9 (L) 21 - 52 %  
  MONOCYTES 9 3 - 10 %  
  EOSINOPHILS 0 0 - 5 %  
  BASOPHILS 0 0 - 2 %  
  ABS. NEUTROPHILS 11.1 (H) 1.8 - 8.0 K/UL  
  ABS. LYMPHOCYTES 1.3 0.9 - 3.6 K/UL  
  ABS. MONOCYTES 1.2 0.05 - 1.2 K/UL   ABS. EOSINOPHILS 0.0 0.0 - 0.4 K/UL  
  ABS. BASOPHILS 0.0 0.0 - 0.1 K/UL  
  DF AUTOMATED METABOLIC PANEL, COMPREHENSIVE  
  Collection Time: 03/31/19 10:36 PM  
Result Value Ref Range  
  Sodium 138 136 - 145 mmol/L  
  Potassium 3.3 (L) 3.5 - 5.5 mmol/L  
  Chloride 102 100 - 108 mmol/L  
  CO2 27 21 - 32 mmol/L  
  Anion gap 9 3.0 - 18 mmol/L  
  Glucose 147 (H) 74 - 99 mg/dL  
  BUN 37 (H) 7.0 - 18 MG/DL  
  Creatinine 1.30 0.6 - 1.3 MG/DL  
  BUN/Creatinine ratio 28 (H) 12 - 20    
  GFR est AA 47 (L) >60 ml/min/1.73m2  
  GFR est non-AA 39 (L) >60 ml/min/1.73m2  
  Calcium 8.9 8.5 - 10.1 MG/DL  
  Bilirubin, total 0.8 0.2 - 1.0 MG/DL  
  ALT (SGPT) 60 (H) 13 - 56 U/L  
  AST (SGOT) 173 (H) 15 - 37 U/L  
  Alk. phosphatase 70 45 - 117 U/L  
  Protein, total 6.3 (L) 6.4 - 8.2 g/dL  
  Albumin 3.2 (L) 3.4 - 5.0 g/dL  
  Globulin 3.1 2.0 - 4.0 g/dL  
  A-G Ratio 1.0 0.8 - 1.7 CARDIAC PANEL,(CK, CKMB & TROPONIN)  
  Collection Time: 03/31/19 10:36 PM  
Result Value Ref Range  
  CK 5,595 (H) 26 - 192 U/L  
  CK - MB 41.1 (H) <3.6 ng/ml  
  CK-MB Index 0.7 0.0 - 4.0 %  
  Troponin-I, QT 0.08 (H) 0.0 - 0.045 NG/ML  
URINALYSIS W/ RFLX MICROSCOPIC  
  Collection Time: 03/31/19 11:53 PM  
Result Value Ref Range  
  Color DARK YELLOW    
  Appearance TURBID    
  Specific gravity 1.030 1.005 - 1.030    
  pH (UA) 5.5 5.0 - 8.0    
  Protein 30 (A) NEG mg/dL  
  Glucose NEGATIVE  NEG mg/dL  
  Ketone TRACE (A) NEG mg/dL  
  Bilirubin NEGATIVE  NEG    
  Blood MODERATE (A) NEG    
  Urobilinogen 1.0 0.2 - 1.0 EU/dL  
  Nitrites NEGATIVE  NEG    
  Leukocyte Esterase SMALL (A) NEG URINE MICROSCOPIC ONLY  
  Collection Time: 03/31/19 11:53 PM  
Result Value Ref Range  
  WBC 30 to 40 0 - 4 /hpf  
  RBC 5 to 9 0 - 5 /hpf  
  Epithelial cells 2+ 0 - 5 /lpf  
  Bacteria 4+ (A) NEG /hpf CARDIAC PANEL,(CK, CKMB & TROPONIN)  
  Collection Time: 04/01/19  3:00 AM  
Result Value Ref Range   CK 5,595 (H) 26 - 192 U/L  
  CK - MB 30.8 (H) <3.6 ng/ml  
  CK-MB Index 0.6 0.0 - 4.0 %  
  Troponin-I, QT 0.08 (H) 0.0 - 0.045 NG/ML  
POC LACTIC ACID  
  Collection Time: 04/01/19  7:44 AM  
Result Value Ref Range  
  Lactic Acid (POC) 0.87 0.40 - 2.00 mmol/L  
  
  
  
Assessment/Plan:  
80 y.o. female with PMH arthritis, HTN,HLD, now admitted with rhabdomyolysis. 
  
Rhabdomyolysis: On admission her CK was elevated to 5,595 on two separate occassions. Myoglobinuria present in the U/A. Received 4 L NS in the ER. DDX: prolonged immobilization,marked exertion, drugs, infections, electrolyte disorders.  
-admit to medical floor  
-trend CK 
-Monitor CBC, BMP 
-125 NS mL/hr 
-PT/OT/CM 
-Fall precautions  
  
Acute cystitis: U/A showed small Leukocyte esterase, WBC 30-40, bacteria 4+. -ceftriaxone 1g given one 
-Continue ceftriaxone  
-f/u urine culture  
  
*See interns note for chronic management.  
Won Barth MD, PGY-2 [facility]

## 2019-04-02 ENCOUNTER — APPOINTMENT (OUTPATIENT)
Dept: GENERAL RADIOLOGY | Age: 84
DRG: 690 | End: 2019-04-02
Attending: STUDENT IN AN ORGANIZED HEALTH CARE EDUCATION/TRAINING PROGRAM
Payer: MEDICARE

## 2019-04-02 LAB
ANION GAP SERPL CALC-SCNC: 8 MMOL/L (ref 3–18)
BUN SERPL-MCNC: 13 MG/DL (ref 7–18)
BUN/CREAT SERPL: 23 (ref 12–20)
CALCIUM SERPL-MCNC: 7.6 MG/DL (ref 8.5–10.1)
CHLORIDE SERPL-SCNC: 111 MMOL/L (ref 100–108)
CK MB CFR SERPL CALC: 0.4 % (ref 0–4)
CK MB CFR SERPL CALC: 0.5 % (ref 0–4)
CK MB SERPL-MCNC: 12.5 NG/ML (ref 5–25)
CK MB SERPL-MCNC: 8.4 NG/ML (ref 5–25)
CK SERPL-CCNC: 1999 U/L (ref 26–192)
CK SERPL-CCNC: 2424 U/L (ref 26–192)
CO2 SERPL-SCNC: 24 MMOL/L (ref 21–32)
CREAT SERPL-MCNC: 0.57 MG/DL (ref 0.6–1.3)
ERYTHROCYTE [DISTWIDTH] IN BLOOD BY AUTOMATED COUNT: 13.2 % (ref 11.6–14.5)
GLUCOSE SERPL-MCNC: 112 MG/DL (ref 74–99)
HCT VFR BLD AUTO: 35.5 % (ref 35–45)
HGB BLD-MCNC: 11.9 G/DL (ref 12–16)
MAGNESIUM SERPL-MCNC: 1.8 MG/DL (ref 1.6–2.6)
MCH RBC QN AUTO: 30.6 PG (ref 24–34)
MCHC RBC AUTO-ENTMCNC: 33.5 G/DL (ref 31–37)
MCV RBC AUTO: 91.3 FL (ref 74–97)
PLATELET # BLD AUTO: 203 K/UL (ref 135–420)
PMV BLD AUTO: 9.9 FL (ref 9.2–11.8)
POTASSIUM SERPL-SCNC: 3.2 MMOL/L (ref 3.5–5.5)
POTASSIUM SERPL-SCNC: 3.9 MMOL/L (ref 3.5–5.5)
POTASSIUM SERPL-SCNC: 3.9 MMOL/L (ref 3.5–5.5)
RBC # BLD AUTO: 3.89 M/UL (ref 4.2–5.3)
SODIUM SERPL-SCNC: 143 MMOL/L (ref 136–145)
TROPONIN I SERPL-MCNC: 0.03 NG/ML (ref 0–0.04)
TROPONIN I SERPL-MCNC: 0.06 NG/ML (ref 0–0.04)
WBC # BLD AUTO: 11 K/UL (ref 4.6–13.2)

## 2019-04-02 PROCEDURE — 84132 ASSAY OF SERUM POTASSIUM: CPT

## 2019-04-02 PROCEDURE — 74011000250 HC RX REV CODE- 250: Performed by: STUDENT IN AN ORGANIZED HEALTH CARE EDUCATION/TRAINING PROGRAM

## 2019-04-02 PROCEDURE — 74011250636 HC RX REV CODE- 250/636: Performed by: STUDENT IN AN ORGANIZED HEALTH CARE EDUCATION/TRAINING PROGRAM

## 2019-04-02 PROCEDURE — 97165 OT EVAL LOW COMPLEX 30 MIN: CPT

## 2019-04-02 PROCEDURE — 97530 THERAPEUTIC ACTIVITIES: CPT

## 2019-04-02 PROCEDURE — 74011250637 HC RX REV CODE- 250/637: Performed by: STUDENT IN AN ORGANIZED HEALTH CARE EDUCATION/TRAINING PROGRAM

## 2019-04-02 PROCEDURE — 65660000000 HC RM CCU STEPDOWN

## 2019-04-02 PROCEDURE — 73564 X-RAY EXAM KNEE 4 OR MORE: CPT

## 2019-04-02 PROCEDURE — 72100 X-RAY EXAM L-S SPINE 2/3 VWS: CPT

## 2019-04-02 PROCEDURE — 85027 COMPLETE CBC AUTOMATED: CPT

## 2019-04-02 PROCEDURE — 82550 ASSAY OF CK (CPK): CPT

## 2019-04-02 PROCEDURE — 97535 SELF CARE MNGMENT TRAINING: CPT

## 2019-04-02 PROCEDURE — 83735 ASSAY OF MAGNESIUM: CPT

## 2019-04-02 PROCEDURE — 97162 PT EVAL MOD COMPLEX 30 MIN: CPT

## 2019-04-02 PROCEDURE — 36415 COLL VENOUS BLD VENIPUNCTURE: CPT

## 2019-04-02 RX ORDER — DICLOFENAC SODIUM 10 MG/G
4 GEL TOPICAL
Status: DISCONTINUED | OUTPATIENT
Start: 2019-04-02 | End: 2019-04-05 | Stop reason: HOSPADM

## 2019-04-02 RX ORDER — POTASSIUM CHLORIDE 20 MEQ/1
20 TABLET, EXTENDED RELEASE ORAL
Status: COMPLETED | OUTPATIENT
Start: 2019-04-02 | End: 2019-04-02

## 2019-04-02 RX ORDER — POTASSIUM CHLORIDE 1.5 G/1.77G
20 POWDER, FOR SOLUTION ORAL ONCE
Status: ACTIVE | OUTPATIENT
Start: 2019-04-02 | End: 2019-04-03

## 2019-04-02 RX ADMIN — POTASSIUM CHLORIDE 20 MEQ: 20 TABLET, EXTENDED RELEASE ORAL at 11:55

## 2019-04-02 RX ADMIN — CEFTRIAXONE SODIUM 1 G: 1 INJECTION, POWDER, FOR SOLUTION INTRAMUSCULAR; INTRAVENOUS at 01:45

## 2019-04-02 RX ADMIN — SODIUM CHLORIDE 125 ML/HR: 900 INJECTION, SOLUTION INTRAVENOUS at 00:07

## 2019-04-02 RX ADMIN — POTASSIUM CHLORIDE 20 MEQ: 20 TABLET, EXTENDED RELEASE ORAL at 08:25

## 2019-04-02 RX ADMIN — SODIUM CHLORIDE 125 ML/HR: 900 INJECTION, SOLUTION INTRAVENOUS at 09:45

## 2019-04-02 RX ADMIN — HEPARIN SODIUM 5000 UNITS: 5000 INJECTION INTRAVENOUS; SUBCUTANEOUS at 08:26

## 2019-04-02 RX ADMIN — POTASSIUM CHLORIDE 20 MEQ: 20 TABLET, EXTENDED RELEASE ORAL at 13:51

## 2019-04-02 RX ADMIN — POTASSIUM CHLORIDE 20 MEQ: 20 TABLET, EXTENDED RELEASE ORAL at 10:12

## 2019-04-02 RX ADMIN — HEPARIN SODIUM 5000 UNITS: 5000 INJECTION INTRAVENOUS; SUBCUTANEOUS at 01:51

## 2019-04-02 NOTE — ROUTINE PROCESS
Bedside and Verbal shift change report given to Luisa Grace RN 
 (oncoming nurse) by Richard Rothman RN (offgoing nurse). Report included the following information SBAR, Kardex, Intake/Output, MAR and Recent Results.

## 2019-04-02 NOTE — PROGRESS NOTES
Problem: Mobility Impaired (Adult and Pediatric) Goal: *Acute Goals and Plan of Care (Insert Text) Description Physical Therapy Goals Initiated 4/2/2019 and to be accomplished within 7 day(s) 1. Patient will move from supine to sit and sit to supine , scoot up and down and roll side to side in bed with supervision/set-up. 2.  Patient will transfer from bed to chair and chair to bed with minimal assistance/contact guard assist using the least restrictive device. 3.  Patient will perform sit to stand with minimal assistance/contact guard assist. 
4.  Patient will ambulate with minimal assistance/contact guard assist for 50 feet with the least restrictive device. 5.  Patient will ascend/descend 4 stairs with 1-2 handrail(s) with moderate assistance . Outcome: Progressing Towards Goal 
 PHYSICAL THERAPY EVALUATION Patient: Frank Krishna (89 y.o. female) Date: 4/2/2019 Primary Diagnosis: Rhabdomyolysis [M62.82] Rhabdomyolysis [M62.82] Precautions:   Fall ASSESSMENT : 
PT orders received and patient cleared by nursing to participate with therapy. Patient is a 80 y.o. female admitted to the hospital due to a fall. Patient consents to PT evaluation and treatment. Pt lives alone but next door to her daughter. Pt was outside hanging laundry and tripped on a hole. She was unable to stand up and crawled into her home. She was not able to reach the telephone and was on the floor/ground until her neighbor checked on her (about 9 hours down per notes). Pt was independent with mobility including gait no AD. Pt's neighbor/friend is present during therapy session. Performed supine to sit minimal assistance. Sit to stands moderate/maximum assistance. She needs cues for rocking technique and hand placement. Gait bed to chair 3 feet RW moderate assistance. Pt bears little weight to R LE at this time and complains mostly of R foot/big toe pain.  Nursing informed of pain. Pt does have a history of gout and has had a recent fall. Pt ended therapy sitting in recliner with all needs met. Nursing Julianne Billingsley) stated pt does not need a chair alarm at this time but is informed of pt in the chair. Patient will benefit from skilled intervention to address the above impairments and increase functional independence. Patient?s rehabilitation potential is considered to be Good Factors which may influence rehabilitation potential include:  
? None noted ? Mental ability/status ? Medical condition ? Home/family situation and support systems ? Safety awareness 
? Pain tolerance/management 
? Other: PLAN : 
Recommendations and Planned Interventions: 
?           Bed Mobility Training             ? Neuromuscular Re-Education ? Transfer Training                   ? Orthotic/Prosthetic Training 
? Gait Training                          ? Modalities ? Therapeutic Exercises          ? Edema Management/Control ? Therapeutic Activities            ? Patient and Family Training/Education ? Other (comment): Frequency/Duration: Patient will be followed by physical therapy 1-2 times per day to address goals. Discharge Recommendations: Rehab Further Equipment Recommendations for Discharge: rolling walker SUBJECTIVE:  
Patient stated ? 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 OBJECTIVE DATA SUMMARY:  
 
Past Medical History:  
Diagnosis Date Essential hypertension Hyperlipidemia Other premature beats 5/13/2013  
  3.  11,437 single ve's, 16 paired. Bigeminy and trigeminy noted. Approximately 15% o pvc's on ekg. asymptomatic potassium normal check echo for lv function History reviewed. No pertinent surgical history. Barriers to Learning/Limitations: None Compensate with: N/A 
 Prior Level of Function/Home Situation: Independent with mobility including gait no AD. Home Situation Home Environment: Private residence # Steps to Enter: 4 Rails to Enter: Yes Hand Rails : Bilateral 
One/Two Story Residence: Two story, live on 1st floor Living Alone: Yes Support Systems: (daughter lives next door and neighbors assist prn) Patient Expects to be Discharged to[de-identified] Private residence Current DME Used/Available at Home: [place], straight, [employer] [place] Critical Behavior: 
Neurologic State: Alert Psychosocial 
Patient Behaviors: Calm; Cooperative Family  Behaviors: Calm; Cooperative Strength:   
Strength: Generally decreased, functional(B LE) Tone & Sensation:  
Tone: Normal(B LE) Sensation: Intact(B LE) Range Of Motion: 
AROM: Generally decreased, functional(B LE) Functional Mobility: 
Bed Mobility: 
Supine to Sit: Minimum assistance Transfers: 
Sit to Stand: Moderate assistance;Maximum assistance Stand to Sit: Moderate assistance Balance:  
Sitting: Intact Standing: Impaired; With support Standing - Static: Fair Standing - Dynamic : Poor Ambulation/Gait Training: 
Distance (ft): 3 Feet (ft) Assistive Device: Walker, rolling Ambulation - Level of Assistance: Moderate assistance Base of Support: Center of gravity altered;Shift to left Speed/Marva: Slow Therapeutic Exercises:  
Reviewed and performed ankle pumps. Also reviewed heel slides and LAQ for knee ROM due to arthritis. Pain: 
Pre: mild/moderate B knees and R foot/toe Post: moderate B knees and R foot/toe Activity Tolerance:  
fair Please refer to the flowsheet for vital signs taken during this treatment. After treatment:  
? Patient left in no apparent distress sitting up in chair ? Patient left sitting on EOB ? Patient left in no apparent distress in bed ? Patient declined to be OOB at this time due to   
? Call bell left within reach ? Nursing notified(Leydi) ? Caregiver present ? Bed alarm activated ? Personal items in reach COMMUNICATION/EDUCATION:  
?         Fall prevention education was provided and the patient/caregiver indicated understanding. ? Patient/family have participated as able in goal setting and plan of care. ?         Patient/family agree to work toward stated goals and plan of care. ?         Patient understands intent and goals of therapy, but is neutral about his/her participation. ? Patient is unable to participate in goal setting and plan of care. ? Role of physical therapy. ?         Out of bed with nursing assistance 3-5 times a day. Thank you for this referral. 
Rita Pompa, PT, DPT Time Calculation: 33 mins Eval Complexity: History: MEDIUM  Complexity : 1-2 comorbidities / personal factors will impact the outcome/ POC Exam:HIGH Complexity : 4+ Standardized tests and measures addressing body structure, function, activity limitation and / or participation in recreation  Presentation: MEDIUM Complexity : Evolving with changing characteristics  Clinical Decision Making:Medium Complexity    Overall Complexity:MEDIUM

## 2019-04-02 NOTE — PROGRESS NOTES
Intern Progress Note [address] Patient: Merlin Marion MRN: [id number]  CSN: [id number] YOB: 1931  Age: 80 y.o. Sex: female DOA: 3/31/2019 LOS:  LOS: 1 day   PCP: Areli Barker MD  
             
Subjective:  
 
Acute events: No acute events overnight. Patient states she feels well and wants to go home. Spoke with patient about labs trending down, and will be evaluated by PT/OT today for recommendations. Brief ROS:  
General: feeling well, denies N/V  
CV: denies chest pain Chest: denies SOB 
MSK: reports knee pain bilaterally Objective:  
  
Patient Vitals for the past 24 hrs: 
 Temp Pulse Resp BP SpO2  
04/02/19 0814 99.3 °F (37.4 °C) 77 16 137/44 94 % 04/02/19 0431 99.2 °F (37.3 °C) 82 17 150/74 94 % 04/02/19 0008 99.1 °F (37.3 °C) 74 18 156/74 95 % 04/01/19 2022 99.3 °F (37.4 °C) 82 20 152/73 97 % 04/01/19 1606 98.8 °F (37.1 °C) 79 20 146/75 99 % 04/01/19 1207 98.2 °F (36.8 °C) 74 21 150/78 98 % 04/01/19 1052 97.8 °F (36.6 °C) 73 20 148/74 97 % Physical Exam:  
General: sitting in bedside chair, healthy, alert, smiling and in no apparent distress Cardiovascular: RRR w/o MRGs Respiratory: CTAB no rales, rhonchi, wheezes Abdomen: Soft, +BS, non-tendeder, Non-Distended Extremities: no peripheral edema, knees without erythema, warmth, no crepitus to palpation Neuro: Cranial nerves grossly intact, grossly moving upper and lower extremities Skin: Negative for lesions, ulcers, rashes Lab/Data Reviewed: All lab results for the last 24 hours reviewed. CBC w/Diff Recent Labs 04/02/19 
0257 03/31/19 
2236 WBC 11.0 13.6*  
RBC 3.89* 4.26  
HGB 11.9* 13.1 HCT 35.5 38.8  251 GRANS  --  82* LYMPH  --  9* EOS  --  0 Chemistry Recent Labs 04/02/19 
0257 03/31/19 2236 * 147*  138  
K 3.2* 3.3*  
* 102 CO2 24 27 BUN 13 37* CREA 0.57* 1.30 CA 7.6* 8.9 MG 1.8  --   
 AGAP 8 9 BUCR 23* 28* AP  --  70  
TP  --  6.3* ALB  --  3.2*  
GLOB  --  3.1 AGRAT  --  1.0 Microbiology No results for input(s): SDES, CULT in the last 72 hours. No results for input(s): CULT in the last 72 hours. I/O Intake/Output Summary (Last 24 hours) at 4/2/2019 3877 Last data filed at 4/2/2019 3695 Gross per 24 hour Intake 3213.75 ml Output 1850 ml Net 1363.75 ml Scheduled Medications Reviewed: 
Current Facility-Administered Medications Medication Dose Route Frequency  potassium chloride (K-DUR, KLOR-CON) SR tablet 20 mEq  20 mEq Oral Q2H  
 heparin (porcine) injection 5,000 Units  5,000 Units SubCUTAneous Q8H  
 0.9% sodium chloride infusion  125 mL/hr IntraVENous CONTINUOUS  
 cefTRIAXone (ROCEPHIN) 1 g in sterile water (preservative free) 10 mL IV syringe  1 g IntraVENous Q24H Assessment/Plan  
 
80 y.o. female with PMH HTN, osteoarthritis, now admitted with fall and found down.  
  
Rhabdomyolysis s/p fall. Patient was down for significant amount of time 3/30 approximately 9 am to 8 PM following a fall in her yard. Per pt and daughter, knee pain R>L likely cause for pt not being able to lift herself up. EKG in ED NSR negative for STEMI. Elevated CK to 5,595 with slight bump in troponins to 0.08 repeat labs were unchanged. Cr 1.3 on admission. UA positive for 30 protein, mod blood, small LE, and 4+ bacteria. Received approx 4,400 cc in ED. CK trended to 2,000s overnight, troponins down to 0.06. Patient feeling well, denies chest pain. - telemetry - trend cardiac panel  
- repeat EKG  
- continue maintenance fluids 125 cc/hr  
- daily cbc, bmp  
- PT/OT, CM recommendations  
- fall precautions  
  
UTI Started ceftriaxone in ED.  
- continue ceftriaxone, deescalate pending cx  
- FU urine cx  
- daily cbc  
  
Hypertension/ HLD 
- hold home losartan-hctz-50-12.5 mf daily  
- hold simvastatin  
  
  
Diet: Renal  
DVT Prophylaxis: Sub Q heparin Code Status: Full Point of Contact: Arnaud Nuñez (daughter) [phone], [facility] [phone] 
  
Disposition and anticipated LOS: 2 midnights  
  
Elle Lewis MD PGY-1 
[facility] Elle Lewis MD PGY-1 
4/2/2019, 8:34 AM

## 2019-04-02 NOTE — PROGRESS NOTES
Discharge Planning:   
Chart reviewed. PT recommending rehab. Notified  Cindy of ARU. NUNO ChanceN RN Care Management Pager: [phone]

## 2019-04-02 NOTE — PROGRESS NOTES
Problem: Self Care Deficits Care Plan (Adult) Goal: *Acute Goals and Plan of Care (Insert Text) Description Occupational Therapy Goals Initiated 4/2/2019 within 7 day(s). 1.  Patient will perform lower body dressing with minimal assistance/contact guard assist  
2. Patient will perform toileting with minimal assistance/contact guard assist. 
3.  Patient will perform toilet transfer with minimal assistance/contact guard assist. 
4.  Patient will perform bed mobility with supervision to participate in self-care tasks. 5.  Patient will participate in upper extremity therapeutic exercise/activities with supervision/set-up for 8 minutes. Outcome: Progressing Towards Goal 
 OCCUPATIONAL THERAPY EVALUATION Patient: Valeria Vegas (23 y.o. female) Date: 4/2/2019 Primary Diagnosis: Rhabdomyolysis [M62.82] Rhabdomyolysis [M62.82] Precautions: Fall PLOF: Pt was (I) with basic self-care/ADLs and IADLs, including cooking, cleaning, laundry, grocery shopping, medication management, driving, taking care of her three cats, and crocheting PTA. She did not require AD for functional mobility. ASSESSMENT : 
Pt cleared to participate in OT evaluation by RN. Upon entering room, pt seated in [facility], alert, and agreeable to participate in OT evaluation. Based on the objective data described below, the patient presents with increased pain in RLE, decreased strength, decreased endurance, decreased functional balance, and decreased functional mobility, affecting her performance in basic self-care/ADL tasks. Pt requires mod assist x2 to participate in [facility] to bed SPT with verbal cues for correct hand placement on BSC to RW. Educated pt to feel for surface of the bed before attempting to sit down on bed for safe transfers. While sitting EOB, pt presents BUEs AROM and coordination to be [facility], however strength is generally decreased.  Pt demonstrates she is able to reach down to B ankles, but has difficulty donning B socks at this time. Due to patient's decreased standing balance, pt requires mod assist for LB dressing. Issued pt a red theraband to increase UB strength for dressing tasks. Educated pt on the role of OT, evaluation process, TherEx, and goals for therapy with pt demonstrating good understanding. The pt will benefit from further OT services, in order to maximize her ADL performance and decrease the risk for complications associated with decreased functional activity. At the end of the session, pt required mod assist for supine-sit, with max assist x2 to scoot to [facility]. Call bell in reach, bed alarm set, with all needs met. Patient will benefit from skilled intervention to address the above impairments. Patient's rehabilitation potential is considered to be Good Factors which may influence rehabilitation potential include: ? None noted ? Mental ability/status ? Medical condition ? Home/family situation and support systems ? Safety awareness ? Pain tolerance/management ? Other: PLAN : 
Recommendations and Planned Interventions:  
?               Self Care Training                  ? Therapeutic Activities ? Functional Mobility Training   ? Cognitive Retraining 
? Therapeutic Exercises           ? Endurance Activities ? Balance Training                    ? Neuromuscular Re-Education ? Visual/Perceptual Training     ? Home Safety Training 
? Patient Education                   ? Family Training/Education ? Other (comment): Frequency/Duration: Patient will be followed by occupational therapy 1-2 times per day/4-7 days per week to address goals. Discharge Recommendations: Inpatient Rehab Further Equipment Recommendations for Discharge: BSC, Rw, shower chair SUBJECTIVE:  
 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  
 
Past Medical History:  
Diagnosis Date  Essential hypertension  Hyperlipidemia  Other premature beats 5/13/2013  
  3.  11,437 single ve's, 16 paired. Bigeminy and trigeminy noted. Approximately 15% o pvc's on ekg. asymptomatic potassium normal check echo for lv function History reviewed. No pertinent surgical history. Barriers to Learning/Limitations: None Compensate with: visual, verbal, tactile, kinesthetic cues/model Home Situation: Pt reports she lives alone next to her daughter. She was (I) with ADLs and IADLs, including cooking, cleaning, laundry, grocery shopping, medication management, driving, and taking care of her three cats PTA. Home Situation Home Environment: Private residence # Steps to Enter: 4 Rails to Enter: Yes Hand Rails : Bilateral 
One/Two Story Residence: Two story, live on 1st floor Living Alone: Yes Support Systems: (daughter lives next door and neighbors assist prn) Patient Expects to be Discharged to[de-identified] Private residence Current DME Used/Available at Home: [employer], straight, [employer] Tub or Shower Type: Tub/Shower combination(Also has access to a walk-in shower; Pt reports she sponge bathes) ? Right hand dominant   ? Left hand dominant Cognitive/Behavioral Status: 
Neurologic State: Alert Orientation Level: Oriented X4 Cognition: Follows commands Safety/Judgement: Fall prevention Skin: Slight bruising on RUE Edema: None noted on BUEs Vision/Perceptual:   
Acuity: Within Defined Limits(without glasses) Coordination: BUE Coordination: Within functional limits Fine Motor Skills-Upper: Left Intact; Right Intact Gross Motor Skills-Upper: Left Intact; Right Intact Balance: 
Sitting: Intact Standing: Impaired; With support Standing - Static: Fair Standing - Dynamic : Poor Strength: BUE Strength: Generally decreased, functional 
 
 
Tone & Sensation: BUE 
 Tone: Normal 
Sensation: Intact Range of Motion: BUE 
AROM: Within functional limits Functional Mobility and Transfers for ADLs: 
Bed Mobility: 
Supine to Sit: Minimum assistance Sit to Supine: Moderate assistance Transfers: 
Sit to Stand: Moderate assistance;Assist x2 Toilet Transfer : Moderate assistance;Assist x2(BSC > bed SPT) ADL Assessment:  
Feeding: Setup Oral Facial Hygiene/Grooming: Setup Bathing: Minimum assistance Upper Body Dressing: Stand-by assistance Lower Body Dressing: Moderate assistance;Assist x2(Unable to reach past B ankles; Decreased standing balance to don LB clothing over hips) Toileting: Moderate assistance;Assist x2 ADL Intervention: Lower Body Dressing Assistance Dressing Assistance: Minimum assistance Position Performed: Seated edge of bed Toileting Toileting Assistance: Moderate assistance(x2; BSC > bed SPT) Cognitive Retraining Safety/Judgement: Fall prevention Therapeutic Exercise: 
Issued pt a red theraband. Pt was able to perform 10 repetitions of shoulder horizontal abduction and 10 reps of R/L bicep curls to increase UB strength in preparation for dressing tasks. Pain: 
Pain reports pain in R LE during movement, which has been occurring since her fall last Saturday. However, pt did not report a number on the pain level scale. Activity Tolerance:  
Fair Please refer to the flowsheet for vital signs taken during this treatment. After treatment:  
? Patient left in no apparent distress sitting up in chair ? Patient left in no apparent distress in bed 
? Call bell left within reach ? Nursing notified ? Nursing student present ? Bed alarm activated COMMUNICATION/EDUCATION:  
? Role of Occupational Therapy in the acute care setting 
? Home safety education was provided and the patient/caregiver indicated understanding. ? Patient/family have participated as able in goal setting and plan of care. ? Patient/family agree to work toward stated goals and plan of care. ? Patient understands intent and goals of therapy, but is neutral about his/her participation. ? Patient is unable to participate in goal setting and plan of care. Thank you for this referral. 
Marvell Dakins, OT Time Calculation: 26 mins Eval Complexity: History: MEDIUM Complexity : Expanded review of history including physical, cognitive and psychosocial  history ; Examination: LOW Complexity : 1-3 performance deficits relating to physical, cognitive , or psychosocial skils that result in activity limitations and / or participation restrictions ; Decision Making:LOW Complexity : No comorbidities that affect functional and no verbal or physical assistance needed to complete eval tasks

## 2019-04-02 NOTE — ROUTINE PROCESS
Bedside and Verbal shift change report given to STEVO Chaudhari  by Graham Cody. JOEY Anderson . Report included the following information SBAR, Kardex, Intake/Output, MAR and Recent Results.

## 2019-04-03 LAB
ANION GAP SERPL CALC-SCNC: 6 MMOL/L (ref 3–18)
BUN SERPL-MCNC: 11 MG/DL (ref 7–18)
BUN/CREAT SERPL: 19 (ref 12–20)
CALCIUM SERPL-MCNC: 7.7 MG/DL (ref 8.5–10.1)
CHLORIDE SERPL-SCNC: 111 MMOL/L (ref 100–108)
CK MB CFR SERPL CALC: 0.3 % (ref 0–4)
CK MB SERPL-MCNC: 4.2 NG/ML (ref 5–25)
CK SERPL-CCNC: 1213 U/L (ref 26–192)
CO2 SERPL-SCNC: 24 MMOL/L (ref 21–32)
CREAT SERPL-MCNC: 0.57 MG/DL (ref 0.6–1.3)
ERYTHROCYTE [DISTWIDTH] IN BLOOD BY AUTOMATED COUNT: 13.2 % (ref 11.6–14.5)
GLUCOSE SERPL-MCNC: 114 MG/DL (ref 74–99)
HCT VFR BLD AUTO: 32.1 % (ref 35–45)
HGB BLD-MCNC: 10.7 G/DL (ref 12–16)
MCH RBC QN AUTO: 30.6 PG (ref 24–34)
MCHC RBC AUTO-ENTMCNC: 33.3 G/DL (ref 31–37)
MCV RBC AUTO: 91.7 FL (ref 74–97)
PLATELET # BLD AUTO: 198 K/UL (ref 135–420)
PMV BLD AUTO: 10.2 FL (ref 9.2–11.8)
POTASSIUM SERPL-SCNC: 3.6 MMOL/L (ref 3.5–5.5)
RBC # BLD AUTO: 3.5 M/UL (ref 4.2–5.3)
SODIUM SERPL-SCNC: 141 MMOL/L (ref 136–145)
TROPONIN I SERPL-MCNC: 0.05 NG/ML (ref 0–0.04)
WBC # BLD AUTO: 8.6 K/UL (ref 4.6–13.2)

## 2019-04-03 PROCEDURE — 97535 SELF CARE MNGMENT TRAINING: CPT

## 2019-04-03 PROCEDURE — 65660000000 HC RM CCU STEPDOWN

## 2019-04-03 PROCEDURE — 97116 GAIT TRAINING THERAPY: CPT

## 2019-04-03 PROCEDURE — 80048 BASIC METABOLIC PNL TOTAL CA: CPT

## 2019-04-03 PROCEDURE — 36415 COLL VENOUS BLD VENIPUNCTURE: CPT

## 2019-04-03 PROCEDURE — 82550 ASSAY OF CK (CPK): CPT

## 2019-04-03 PROCEDURE — 97530 THERAPEUTIC ACTIVITIES: CPT

## 2019-04-03 PROCEDURE — 74011250637 HC RX REV CODE- 250/637: Performed by: STUDENT IN AN ORGANIZED HEALTH CARE EDUCATION/TRAINING PROGRAM

## 2019-04-03 PROCEDURE — 85027 COMPLETE CBC AUTOMATED: CPT

## 2019-04-03 PROCEDURE — 93005 ELECTROCARDIOGRAM TRACING: CPT

## 2019-04-03 PROCEDURE — 74011250636 HC RX REV CODE- 250/636: Performed by: STUDENT IN AN ORGANIZED HEALTH CARE EDUCATION/TRAINING PROGRAM

## 2019-04-03 PROCEDURE — 74011000250 HC RX REV CODE- 250: Performed by: STUDENT IN AN ORGANIZED HEALTH CARE EDUCATION/TRAINING PROGRAM

## 2019-04-03 RX ADMIN — HEPARIN SODIUM 5000 UNITS: 5000 INJECTION INTRAVENOUS; SUBCUTANEOUS at 01:02

## 2019-04-03 RX ADMIN — CEFTRIAXONE SODIUM 1 G: 1 INJECTION, POWDER, FOR SOLUTION INTRAMUSCULAR; INTRAVENOUS at 01:02

## 2019-04-03 RX ADMIN — DICLOFENAC 4 G: 10 GEL TOPICAL at 17:27

## 2019-04-03 NOTE — PROGRESS NOTES
Problem: Self Care Deficits Care Plan (Adult) Goal: *Acute Goals and Plan of Care (Insert Text) Description Occupational Therapy Goals Initiated 4/2/2019 within 7 day(s). 1.  Patient will perform lower body dressing with minimal assistance/contact guard assist  
2. Patient will perform toileting with minimal assistance/contact guard assist. 
3.  Patient will perform toilet transfer with minimal assistance/contact guard assist. 
4.  Patient will perform bed mobility with supervision to participate in self-care tasks. 5.  Patient will participate in upper extremity therapeutic exercise/activities with supervision/set-up for 8 minutes. Outcome: Progressing Towards Goal 
 OCCUPATIONAL THERAPY TREATMENT Patient: Sander Ewing (10 y.o. female) Date: 4/3/2019 Diagnosis: Rhabdomyolysis [M62.82] Rhabdomyolysis [M62.82] Rhabdomyolysis Precautions: Fall PLOF: Pt was (I) with basic self-care/ADLs and IADLs, including cooking, cleaning, laundry, grocery shopping, medication management, driving, taking care of her three cats, and crocheting PTA. She did not require AD for functional mobility. Chart, occupational therapy assessment, plan of care, and goals were reviewed. ASSESSMENT: 
Pt is in the recliner upon entry, reports she has been sitting up for a couple of hours, requesting to reposition. Pt educated on role of OT in acute setting and on the importance of participating in OT sessions to improve strength and endurance and increase independence with ADLs. Pt verbalized understanding. Pt is seen with PT to increase safety of the pt and staff during functional mobility and ADLs. Pt required CGA to don robe and Min A to simulate donning socks while seated in the chair. Pt participated in functional txfr to bed w/FWW (simulating txfr to the [facility] or toilet) requiring Mod A x2 to std and Min A to perform functional txfr. Pt required Mod Ax2 to return to sup c/o pain in back and R knee.  Pt educated on and performed BUE TherEx w/Red Thera-band shd horizontal abd, flexion x10 each. Educated pt and pt's daughter on performing the TherEx 3x times a day to improve strength and endurance for carryover w/ADLs. Due to pt's PLOF of being independent in all ADLs and IADLs, patient would benefit from intensive acute rehab for strengthening in order to improve functional mobility and participation in self care tasks. Progression toward goals: 
?          Improving appropriately and progressing toward goals ? Improving slowly and progressing toward goals ? Not making progress toward goals and plan of care will be adjusted PLAN: 
Patient continues to benefit from skilled intervention to address the above impairments. Continue treatment per established plan of care. Discharge Recommendations:  Rehab Further Equipment Recommendations for Discharge:  N/A  
 
SUBJECTIVE:  
Patient stated ? I am not in a good shape today. ? OBJECTIVE DATA SUMMARY:  
Cognitive/Behavioral Status: 
Neurologic State: Alert Orientation Level: Oriented X4 Cognition: Follows commands Safety/Judgement: Fall prevention Functional Mobility and Transfers for ADLs: 
Bed Mobility: 
  
  
Sit to Supine: Moderate assistance;Assist x2 Transfers: 
Sit to Stand: Moderate assistance;Assist x2 Toilet Transfer : Moderate assistance;Assist x2(simulated) Balance: 
Sitting: Intact Standing: Impaired; With support Standing - Static: Fair Standing - Dynamic : Fair ADL Intervention: 
 Grooming Grooming Assistance: Supervision(seated ) Washing Face: Supervision/set-up Lower Body Dressing Assistance Socks: Minimum assistance(simulated) UE Therapeutic Exercises: BUE TherEx w/Red resistive Theraband Pain: 
Pain level pre-treatment: didn't rate Pain level post-treatment: didn't rate Activity Tolerance:   
Fair Please refer to the flowsheet for vital signs taken during this treatment. After treatment:  
?  Patient left in no apparent distress sitting up in chair ? Patient left in no apparent distress in bed 
? Call bell left within reach ? Nursing notified ? Caregiver present ? Bed alarm activated COMMUNICATION/EDUCATION:  
? Role of Occupational Therapy in the acute care setting 
? Home safety education was provided and the patient/caregiver indicated understanding. ? Patient/family have participated as able in working towards goals and plan of care. ? Patient/family agree to work toward stated goals and plan of care. ? Patient understands intent and goals of therapy, but is neutral about his/her participation. ? Patient is unable to participate in goal setting and plan of care. Thank you for this referral. 
EDITH Shea Time Calculation: 25 mins

## 2019-04-03 NOTE — PROGRESS NOTES
Bedside and Verbal shift change report given to Simi Cage (oncoming nurse) by Louise Caro RN 
 (offgoing nurse). Report given with SBAR, Dimas, MAR and Recent Results.

## 2019-04-03 NOTE — PROGRESS NOTES
Paged [address] regarding decrease in H&H and plt 
 
[phone] Dr Nicole Darden returned paged new order to hold heparin apply SCD

## 2019-04-03 NOTE — PROGRESS NOTES
Intern Progress Note [address] Patient: Selene Felder MRN: [id number]  CSN: [id number] YOB: 1931  Age: 80 y.o. Sex: female DOA: 3/31/2019 LOS:  LOS: 2 days   PCP: Josh Madrigal MD  
             
Subjective:  
 
Acute events: No acute events overnight. Patient states she needed to use bathroom frequently overnight. Still having pain with movement of left knee. Pt states Dr. Phu Alonzo visited her yesterday. Spoke about rehab which pt agreeable to. Brief ROS:  
General: feeling well, denies N/V  
CV: denies chest pain Chest: denies SOB  
MSK: right knee and hip pain Objective:  
  
Patient Vitals for the past 24 hrs: 
 Temp Pulse Resp BP SpO2  
04/03/19 0755 97.8 °F (36.6 °C) 83 17 123/73 93 % 04/03/19 0410 99.4 °F (37.4 °C) 90 16 159/77 93 % 04/03/19 0058 99.5 °F (37.5 °C) 84 17 152/77 94 % 04/02/19 2102 98.2 °F (36.8 °C) 82 20 144/77 96 % 04/02/19 1600 99.6 °F (37.6 °C) 84 20 145/78 96 % 04/02/19 1155 98.6 °F (37 °C) 74 20 138/73 95 % Physical Exam:  
General: awake, in bed, healthy, alert, smiling and in no apparent distress Cardiovascular: RRR w/o MRGs Respiratory: CTAB no rales, rhonchi, wheezes Abdomen: Soft, +BS, non-tendeder, Non-Distended Extremities: some pain in right hip with passive flexion of right knee, no peripheral edema, knees without erythema, warmth, no crepitus to palpation, pt still having trouble lifting right leg off bed, she is able to lift left leg Neuro: Cranial nerves grossly intact, grossly moving upper and lower extremities Skin: Negative for lesions, ulcers, rashes Lab/Data Reviewed: All lab results for the last 24 hours reviewed. CBC w/Diff Recent Labs 04/03/19 
0440 04/02/19 
0257 03/31/19 
2236 WBC 8.6 11.0 13.6*  
RBC 3.50* 3.89* 4.26  
HGB 10.7* 11.9* 13.1 HCT 32.1* 35.5 38.8  203 251 GRANS  --   --  82* LYMPH  --   --  9* EOS  --   --  0 Chemistry Recent Labs 04/03/19 
1853 04/02/19 
1741 04/02/19 
0257 03/31/19 
2236 *  --  112* 147*   --  143 138  
K 3.6 3.9  3.9 3.2* 3.3*  
*  --  111* 102 CO2 24  --  24 27 BUN 11  --  13 37* CREA 0.57*  --  0.57* 1.30 CA 7.7*  --  7.6* 8.9 MG  --   --  1.8  --   
AGAP 6  --  8 9 BUCR 19  --  23* 28* AP  --   --   --  70  
TP  --   --   --  6.3* ALB  --   --   --  3.2*  
GLOB  --   --   --  3.1 AGRAT  --   --   --  1.0 Microbiology Recent Labs 04/01/19 
0730 04/01/19 
0715 03/31/19 
2345 CULT NO GROWTH 2 DAYS NO GROWTH 2 DAYS >100,000 COLONIES/mL GRAM NEGATIVE RODS*  >100,000 COLONIES/mL POSSIBLE STREPTOCOCCI, ALPHA HEMOLYTIC* Recent Labs 04/01/19 
0730 04/01/19 
0715 03/31/19 
2345 CULT NO GROWTH 2 DAYS NO GROWTH 2 DAYS >100,000 COLONIES/mL GRAM NEGATIVE RODS*  >100,000 COLONIES/mL POSSIBLE STREPTOCOCCI, ALPHA HEMOLYTIC* I/O Intake/Output Summary (Last 24 hours) at 4/3/2019 1313 Last data filed at 4/3/2019 9991 Gross per 24 hour Intake 390 ml Output 650 ml Net -260 ml Scheduled Medications Reviewed: 
Current Facility-Administered Medications Medication Dose Route Frequency  heparin (porcine) injection 5,000 Units  5,000 Units SubCUTAneous Q8H  
 cefTRIAXone (ROCEPHIN) 1 g in sterile water (preservative free) 10 mL IV syringe  1 g IntraVENous Q24H Assessment/Plan  
 
80 y.o. female with PMH HTN, osteoarthritis, now admitted with fall and found down.  
  
Rhabdomyolysis/Fall  
s/p fall. Patient was down for significant amount of time 3/30 approximately 9 am to 8 PM following a fall in her yard. Per pt and daughter, knee pain R>L likely cause for pt not being able to lift herself up. EKG in ED NSR negative for STEMI. Elevated CK to 5,595 with slight bump in troponins to 0.08 repeat labs were unchanged. Cr 1.3 on admission. UA positive for 30 protein, mod blood, small LE, and 4+ bacteria.  Received approx 4,400 cc in ED. CK trended to 1200. troponins down to 0.06>0.03>0. 05. Patient feeling well, denies chest pain. X ray right knee, lumbar spine without acute fracture. R hip pain with knee flexion may indicate hip etiology for her pain with walking.   
- telemetry - R hip radiograph  
- daily cbc, bmp  
- fluids d/c  
- PT/OT, CM recommendations- recommends rehab  
- fall precautions UTI Started ceftriaxone in ED. Ucx positive for GNR, beta hemolytic strep, sensitivities pending. Bcx NGTD 
- continue ceftriaxone, deescalate pending cx 
- FU urine cx  
- daily cbc  
  
Hypertension/ HLD 
- hold home losartan-hctz-50-12.5 mf daily  
- hold simvastatin  
  
  
Diet: Renal  
DVT Prophylaxis: Sub Q heparin Code Status: Full Point of Contact: Keerthi Walter (daughter) [phone], GNSTY [phone] 
  
Disposition and anticipated LOS: 2 midnights  
  
Tika Lamb MD PGY-1 
4/3/2019, 8:34 AM

## 2019-04-03 NOTE — PROGRESS NOTES
Problem: Mobility Impaired (Adult and Pediatric) Goal: *Acute Goals and Plan of Care (Insert Text) Description Physical Therapy Goals Initiated 4/2/2019 and to be accomplished within 7 day(s) 1. Patient will move from supine to sit and sit to supine , scoot up and down and roll side to side in bed with supervision/set-up. 2.  Patient will transfer from bed to chair and chair to bed with minimal assistance/contact guard assist using the least restrictive device. 3.  Patient will perform sit to stand with minimal assistance/contact guard assist. 
4.  Patient will ambulate with minimal assistance/contact guard assist for 50 feet with the least restrictive device. 5.  Patient will ascend/descend 4 stairs with 1-2 handrail(s) with moderate assistance . Outcome: Progressing Towards Goal 
 PHYSICAL THERAPY TREATMENT Patient: Trino Monterroso (1 y.o. female) Date: 4/3/2019 Diagnosis: Rhabdomyolysis [M62.82] Rhabdomyolysis [M62.82] Rhabdomyolysis Precautions: Fall Chart, physical therapy assessment, plan of care and goals were reviewed. OBJECTIVE/ ASSESSMENT: 
Patient found seated in recliner willing to work with PT w/ encouragement. Tx performed w/ OT to maximize safety of pt and staff. Pt voices difficulty w/ transfers 2/2 to general weakness. Prior to mobility, daughter present for tx whom is very involved and helpful w/ tx in learning to assist her mom. Pt req cueing for alignment for sit <> stands and sequencing w/ RW. Pt req mod A X2 to stand, however once proper posture obtained, pt able to maintain stability at a min A to CGA level. Pt amb from chair to bed. Pt req cueing for controlled descent to sit as pt displayed poor ecc control. Pt returned to supine displaying good core stability req assistance for (B) LEs. Pt left in room w/ all needs in reach and notified nurse in room.  Pt is motivated to improve functionally as pt was fully independent prior to admission. Pt tends to be self limiting voicing increased difficulty w/ increased fatigue. Pt would greatly benefit from further therapy in an inpatient rehab setting. Education: 
?         Bed mobility ? Transfers ? Ambulation / gait ? Assistive device management ? Stairs ? Body mechanics ? Position change ? Therapeutic exercise ? Activity pacing / energy conservation ? Other: 
 
Progression toward goals: 
?      Improving appropriately and progressing toward goals ? Improving slowly and progressing toward goals ? Not making progress toward goals and plan of care will be adjusted PLAN: 
Patient continues to benefit from skilled intervention to address the above impairments. Continue treatment per established plan of care. Discharge Recommendations:  Inpatient Rehab Further Equipment Recommendations for Discharge:  rolling walker, does not have rolling, has standard which is not most appropriate SUBJECTIVE:  
Patient stated ? It's just not a good time, I feel weak, but I'll do what I can.? OBJECTIVE DATA SUMMARY:  
Critical Behavior: 
Neurologic State: Alert, Eyes open spontaneously Orientation Level: Oriented X4 Cognition: Appropriate decision making, Follows commands Safety/Judgement: Fall prevention Functional Mobility Training: 
Bed Mobility: 
Sit to Supine: Moderate assistance;Assist x2 Transfers: 
Sit to Stand: Moderate assistance;Assist x2 Stand to Sit: Moderate assistance;Assist x2 Balance: 
Sitting: Intact Standing: Impaired; With support Standing - Static: Fair Standing - Dynamic : Fair Ambulation/Gait Training: 
Distance (ft): 6 Feet (ft) Assistive Device: Walker, rolling Ambulation - Level of Assistance: Minimal assistance;Contact guard assistance;Assist x2 Gait Abnormalities: Decreased step clearance Base of Support: Center of gravity altered Speed/Marva: Slow Step Length: Left shortened;Right shortened Therapeutic Exercises:  
 
 
EXERCISE Sets Reps Active Active Assist  
Passive Self- assited ROM Comments Ankle Pumps 1 10 ? ? ? ?   
Quad Sets   ? ? ? ? Glut Sets   ? ? ? ? Short Arc Quads   ? ? ? ? Heel Slides   ? ? ? ? Straight Leg Raises   ? ? ? ? Hip Abd   ? ? ? ? Long Arc Quads   ? ? ? ? Seated Marching   ? ? ? ? Seated Knee Flexion   ? ? ? ? Standing Marching   ? ? ? ? Pain: 0/10 Activity Tolerance:  
Good Please refer to the flowsheet for vital signs taken during this treatment. After treatment:  
? Patient left in no apparent distress sitting up in chair ? Patient left in no apparent distress in bed 
? Call bell left within reach ? Nursing notified ? Caregiver present ? Bed alarm activated ? SCDs applied ? Ice applied Karine Aguiar PTA Time Calculation: 23 mins

## 2019-04-03 NOTE — ROUTINE PROCESS
Bedside and Verbal shift change report given to JOEY Olvera (oncoming nurse) by Dylan Cabrera (offgoing nurse). Report included the following information SBAR, Kardex and MAR.

## 2019-04-03 NOTE — ROUTINE PROCESS
Bedside and Verbal shift change report given to JOEY Adame (oncoming nurse) by Crystal Can (offgoing nurse). Report included the following information SBAR, Kardex and MAR. Patient had no acute events overnight. Currently resting in NAD. No express needs reported at this time.

## 2019-04-04 ENCOUNTER — APPOINTMENT (OUTPATIENT)
Dept: GENERAL RADIOLOGY | Age: 84
DRG: 690 | End: 2019-04-04
Attending: STUDENT IN AN ORGANIZED HEALTH CARE EDUCATION/TRAINING PROGRAM
Payer: MEDICARE

## 2019-04-04 LAB
ANION GAP SERPL CALC-SCNC: 6 MMOL/L (ref 3–18)
ATRIAL RATE: 81 BPM
BUN SERPL-MCNC: 11 MG/DL (ref 7–18)
BUN/CREAT SERPL: 19 (ref 12–20)
CALCIUM SERPL-MCNC: 7.8 MG/DL (ref 8.5–10.1)
CALCULATED P AXIS, ECG09: 68 DEGREES
CALCULATED R AXIS, ECG10: 64 DEGREES
CALCULATED T AXIS, ECG11: 40 DEGREES
CHLORIDE SERPL-SCNC: 110 MMOL/L (ref 100–108)
CO2 SERPL-SCNC: 25 MMOL/L (ref 21–32)
CREAT SERPL-MCNC: 0.58 MG/DL (ref 0.6–1.3)
DIAGNOSIS, 93000: NORMAL
ERYTHROCYTE [DISTWIDTH] IN BLOOD BY AUTOMATED COUNT: 13.4 % (ref 11.6–14.5)
GLUCOSE SERPL-MCNC: 121 MG/DL (ref 74–99)
HCT VFR BLD AUTO: 33 % (ref 35–45)
HGB BLD-MCNC: 11.1 G/DL (ref 12–16)
MCH RBC QN AUTO: 31 PG (ref 24–34)
MCHC RBC AUTO-ENTMCNC: 33.6 G/DL (ref 31–37)
MCV RBC AUTO: 92.2 FL (ref 74–97)
P-R INTERVAL, ECG05: 170 MS
PLATELET # BLD AUTO: 219 K/UL (ref 135–420)
PMV BLD AUTO: 10.4 FL (ref 9.2–11.8)
POTASSIUM SERPL-SCNC: 3.4 MMOL/L (ref 3.5–5.5)
POTASSIUM SERPL-SCNC: 4.1 MMOL/L (ref 3.5–5.5)
Q-T INTERVAL, ECG07: 382 MS
QRS DURATION, ECG06: 80 MS
QTC CALCULATION (BEZET), ECG08: 443 MS
RBC # BLD AUTO: 3.58 M/UL (ref 4.2–5.3)
SODIUM SERPL-SCNC: 141 MMOL/L (ref 136–145)
VENTRICULAR RATE, ECG03: 81 BPM
WBC # BLD AUTO: 9.8 K/UL (ref 4.6–13.2)

## 2019-04-04 PROCEDURE — 74011250636 HC RX REV CODE- 250/636: Performed by: STUDENT IN AN ORGANIZED HEALTH CARE EDUCATION/TRAINING PROGRAM

## 2019-04-04 PROCEDURE — 97530 THERAPEUTIC ACTIVITIES: CPT

## 2019-04-04 PROCEDURE — 97535 SELF CARE MNGMENT TRAINING: CPT

## 2019-04-04 PROCEDURE — 85027 COMPLETE CBC AUTOMATED: CPT

## 2019-04-04 PROCEDURE — 97116 GAIT TRAINING THERAPY: CPT

## 2019-04-04 PROCEDURE — 36415 COLL VENOUS BLD VENIPUNCTURE: CPT

## 2019-04-04 PROCEDURE — 73502 X-RAY EXAM HIP UNI 2-3 VIEWS: CPT

## 2019-04-04 PROCEDURE — 74011250637 HC RX REV CODE- 250/637: Performed by: STUDENT IN AN ORGANIZED HEALTH CARE EDUCATION/TRAINING PROGRAM

## 2019-04-04 PROCEDURE — 65660000000 HC RM CCU STEPDOWN

## 2019-04-04 PROCEDURE — 74011000250 HC RX REV CODE- 250: Performed by: STUDENT IN AN ORGANIZED HEALTH CARE EDUCATION/TRAINING PROGRAM

## 2019-04-04 PROCEDURE — 84132 ASSAY OF SERUM POTASSIUM: CPT

## 2019-04-04 RX ORDER — NITROFURANTOIN MACROCRYSTALS 50 MG/1
100 CAPSULE ORAL 2 TIMES DAILY
Status: DISCONTINUED | OUTPATIENT
Start: 2019-04-04 | End: 2019-04-04

## 2019-04-04 RX ORDER — NITROFURANTOIN MACROCRYSTALS 50 MG/1
50 CAPSULE ORAL EVERY 6 HOURS
Status: DISCONTINUED | OUTPATIENT
Start: 2019-04-04 | End: 2019-04-05 | Stop reason: HOSPADM

## 2019-04-04 RX ORDER — POTASSIUM CHLORIDE 20 MEQ/1
20 TABLET, EXTENDED RELEASE ORAL
Status: COMPLETED | OUTPATIENT
Start: 2019-04-04 | End: 2019-04-04

## 2019-04-04 RX ADMIN — CEFTRIAXONE SODIUM 1 G: 1 INJECTION, POWDER, FOR SOLUTION INTRAMUSCULAR; INTRAVENOUS at 01:38

## 2019-04-04 RX ADMIN — ACETAMINOPHEN 650 MG: 325 TABLET ORAL at 12:13

## 2019-04-04 RX ADMIN — POTASSIUM CHLORIDE 20 MEQ: 20 TABLET, EXTENDED RELEASE ORAL at 11:47

## 2019-04-04 RX ADMIN — HEPARIN SODIUM 5000 UNITS: 5000 INJECTION INTRAVENOUS; SUBCUTANEOUS at 17:13

## 2019-04-04 RX ADMIN — POTASSIUM CHLORIDE 20 MEQ: 20 TABLET, EXTENDED RELEASE ORAL at 08:55

## 2019-04-04 RX ADMIN — POTASSIUM CHLORIDE 20 MEQ: 20 TABLET, EXTENDED RELEASE ORAL at 08:58

## 2019-04-04 RX ADMIN — NITROFURANTOIN MACROCRYSTALS 50 MG: 50 CAPSULE ORAL at 17:43

## 2019-04-04 RX ADMIN — DICLOFENAC 4 G: 10 GEL TOPICAL at 12:16

## 2019-04-04 RX ADMIN — HEPARIN SODIUM 5000 UNITS: 5000 INJECTION INTRAVENOUS; SUBCUTANEOUS at 09:04

## 2019-04-04 NOTE — PROGRESS NOTES
Problem: Self Care Deficits Care Plan (Adult) Goal: *Acute Goals and Plan of Care (Insert Text) Description Occupational Therapy Goals Initiated 4/2/2019 within 7 day(s). 1.  Patient will perform lower body dressing with minimal assistance/contact guard assist  
2. Patient will perform toileting with minimal assistance/contact guard assist. 
3.  Patient will perform toilet transfer with minimal assistance/contact guard assist. 
4.  Patient will perform bed mobility with supervision to participate in self-care tasks. 5.  Patient will participate in upper extremity therapeutic exercise/activities with supervision/set-up for 8 minutes. Outcome: Progressing Towards Goal 
 OCCUPATIONAL THERAPY TREATMENT Patient: Sander Ewing (24 y.o. female) Date: 4/4/2019 Diagnosis: Rhabdomyolysis [M62.82] Rhabdomyolysis [M62.82] Rhabdomyolysis Precautions: Fall PLOF:Independent Chart, occupational therapy assessment, plan of care, and goals were reviewed. ASSESSMENT: 
Pt is sitting up in the chair, initially stating, she doesn't feel good and is not ready to participate. Pt's daughter arrived and pt agreed to participate. Pt required Min Ax2 to std in preparation for functional txfr training. Pt maneuvered to the BR w/FWW, and required Min/Mod VCs and Min A for toilet txfr (w/BSC over toilet seat for elevation). Pt completed toileting hygiene w/Set-up, and required Min A for clothes management. Pt completed std grooming task at sinkside w/CGA for safety, following which requested to return to sup. Pt participated in [address] [place] [address] w/[employer]. Pt c/o soreness in R shd from fall, however was able to complete 10x each TherEx w/encouragement from her daughter. Pt's daughter is very involved in pt's care and required education on role of therapy and on the importance of therapists attending to pt for safety and not being interrupted.  Pt's daughter is requesting therapy to see pt in the afternoon if possible (after 1pm). Patient is very motivated and cooperative to participate in therapy and to return to [facility], would benefit from intensive acute rehab for strengthening in order to improve functional mobility and participation in self care tasks. Progression toward goals: 
?          Improving appropriately and progressing toward goals ? Improving slowly and progressing toward goals ? Not making progress toward goals and plan of care will be adjusted PLAN: 
Patient continues to benefit from skilled intervention to address the above impairments. Continue treatment per established plan of care. Discharge Recommendations:  Rehab Further Equipment Recommendations for Discharge:  N/A  
 
SUBJECTIVE:  
Patient stated ? I will do what I can.? OBJECTIVE DATA SUMMARY:  
Cognitive/Behavioral Status: 
Neurologic State: Alert Orientation Level: Oriented X4 Cognition: Follows commands Safety/Judgement: Fall prevention Functional Mobility and Transfers for ADLs: 
Bed Mobility: 
  
  
Sit to Supine: Moderate assistance;Assist x2 Transfers: 
Sit to Stand: Minimum assistance;Assist x2 Toilet Transfer : Minimum assistance(w/FWW) Balance: 
Sitting: Intact Standing: Impaired; With support Standing - Static: Good Standing - Dynamic : Fair ADL Intervention: 
 Grooming Grooming Assistance: Contact guard assistance(std sinkside) Washing Face: Contact guard assistance Washing Hands: Contact guard assistance Toileting Bladder Hygiene: Supervision/set-up Clothing Management: Minimum assistance UE Therapeutic Exercises:  
See above Pain: 
Pain didn't rate pain Activity Tolerance:   
Fair Please refer to the flowsheet for vital signs taken during this treatment. After treatment:  
?  Patient left in no apparent distress sitting up in chair ? Patient left in no apparent distress in bed 
? Call bell left within reach ? Nursing notified ? Caregiver present ?  Bed alarm activated COMMUNICATION/EDUCATION:  
? Role of Occupational Therapy in the acute care setting 
? Home safety education was provided and the patient/caregiver indicated understanding. ? Patient/family have participated as able in working towards goals and plan of care. ? Patient/family agree to work toward stated goals and plan of care. ? Patient understands intent and goals of therapy, but is neutral about his/her participation. ? Patient is unable to participate in goal setting and plan of care. Thank you for this referral. 
EDITH Barraza Time Calculation: 46 mins

## 2019-04-04 NOTE — PROGRESS NOTES
Bedside and Verbal shift change report given to [facility] [place] (oncoming nurse) by Ivon Marquez RN 
 (offgoing nurse). Report given with SBAR, Dimas, MAR and Recent Results.

## 2019-04-04 NOTE — PROGRESS NOTES
Problem: Mobility Impaired (Adult and Pediatric) Goal: *Acute Goals and Plan of Care (Insert Text) Description Physical Therapy Goals Initiated 4/2/2019 and to be accomplished within 7 day(s) 1. Patient will move from supine to sit and sit to supine , scoot up and down and roll side to side in bed with supervision/set-up. 2.  Patient will transfer from bed to chair and chair to bed with minimal assistance/contact guard assist using the least restrictive device. 3.  Patient will perform sit to stand with minimal assistance/contact guard assist. 
4.  Patient will ambulate with minimal assistance/contact guard assist for 50 feet with the least restrictive device. 5.  Patient will ascend/descend 4 stairs with 1-2 handrail(s) with moderate assistance . Outcome: Progressing Towards Goal 
 PHYSICAL THERAPY TREATMENT Patient: Yuliet Luna (23 y.o. female) Date: 4/4/2019 Diagnosis: Rhabdomyolysis [M62.82] Rhabdomyolysis [M62.82] Rhabdomyolysis Precautions: Fall Chart, physical therapy assessment, plan of care and goals were reviewed. OBJECTIVE/ ASSESSMENT: 
Patient found seated in recliner willing to work with PT. Pt cont to be self limiting, however agreeable to motivated to better self. Pt's daughter cont to be very supportive and involved w/ therapy as she is primary caregiver for pt. Pt and daughter prefer for therapy to provide services after 1300 to allow for daughter in room and education for both, pt educated on inability to guarantee w/ schedule, however will do best to facilitate. Pt able to improve transfers this visit to min A X2 req cueing w/ fair carry over for sequencing. Pt amb w/ RW for 10'X2 this visit to bathroom and back. Pt returned to room to EOB, sat at EOB req further cueing for controlled descent and sequencing.  Pt performed seated there-ex for LEs and posture, provided further education, and left in room w/ all needs in reach. Pt cont to show progression w/ therapy and would cont to benefit from inpatient rehab setting. Education: 
?         Bed mobility ? Transfers ? Ambulation / gait ? Assistive device management ? Stairs ? Body mechanics ? Position change ? Therapeutic exercise ? Activity pacing / energy conservation ? Other: 
 
Progression toward goals: 
?      Improving appropriately and progressing toward goals ? Improving slowly and progressing toward goals ? Not making progress toward goals and plan of care will be adjusted PLAN: 
Patient continues to benefit from skilled intervention to address the above impairments. Continue treatment per established plan of care. Discharge Recommendations:  Inpatient Rehab Further Equipment Recommendations for Discharge:  rolling walker SUBJECTIVE:  
Patient stated ? I had an X-ray today. ? OBJECTIVE DATA SUMMARY:  
Critical Behavior: 
Neurologic State: Alert Orientation Level: Oriented X4 Cognition: Follows commands Safety/Judgement: Fall prevention Functional Mobility Training: 
Bed Mobility: 
Sit to Supine: Moderate assistance;Assist x2(w/ LEs and direction of trunk ) Transfers: 
Sit to Stand: Minimum assistance;Assist x2 Stand to Sit: Minimum assistance;Assist x2;Contact guard assistance Balance: 
Sitting: Intact Standing: Impaired; With support Standing - Static: Good Standing - Dynamic : Fair Ambulation/Gait Training: 
Distance (ft): 20 Feet (ft) Assistive Device: Walker, rolling Ambulation - Level of Assistance: Minimal assistance;Contact guard assistance;Stand-by assistance Gait Abnormalities: Decreased step clearance; Step to gait Base of Support: Center of gravity altered Speed/Marva: Slow Step Length: Right shortened;Left shortened Therapeutic Exercises:  
 
 
EXERCISE Sets Reps Active Active Assist  
Passive Self- assited ROM Comments Ankle Pumps 1 10 ? ? ? ? Posture over correct 1 10 ? ? ? ?   
C/S retractions  1 10 ? ? ? ? Short Arc Quads   ? ? ? ? Heel Slides   ? ? ? ? Straight Leg Raises   ? ? ? ? Hip Abd   ? ? ? ? Long Arc Quads 1 10 ? ? ? ? Seated Marching   ? ? ? ? Seated Knee Flexion   ? ? ? ? Standing Marching   ? ? ? ? Pain: did not voice number, voiced increased knee pain w/ mobility Activity Tolerance:  
Good Please refer to the flowsheet for vital signs taken during this treatment. After treatment:  
? Patient left in no apparent distress sitting up in chair ? Patient left in no apparent distress in bed 
? Call bell left within reach ? Nursing notified ? Caregiver present ? Bed alarm activated ? SCDs applied ? Ice applied Ara Harper PTA Time Calculation: 42 mins

## 2019-04-04 NOTE — PROGRESS NOTES
[facility] REFERRAL CONTACT NOTE [address] [place] [address] for Physical Rehabilitation RE: Valentina Shoulder Thank you for the opportunity to review this patient's case for admission to [address] [place] [address] for Physical Rehabilitation. Based on our pre-admission screening:  
 
[x ] This patient does not meet criteria for admission to [facility] for Physical Rehabilitation due to: 
 
[x ] Documents do not reflect active medical necessity requiring close Physician involvement and Rehabilitation Nursing. Again, Thank you for this referral. Should you have any questions please do not hesitate to call. Sincerely, Cindy Weeks [facility] 
[phone]

## 2019-04-04 NOTE — PROGRESS NOTES
Discharge Planning: 
Per Jennifer Bae of [facility], pt was declined due to no rehab diagnosis. Informed pt and her daughter Jordan Phan. Pt and her daughter upset that [facility] did not accept pt. Cindy of [facility] explained to them why. Gave pt and her daughter list of SNF. Eppie Baumgarten, BSN RN Care Management Pager: [phone]

## 2019-04-04 NOTE — PROGRESS NOTES
Bedside and Verbal shift change report given to JOEY Adame (oncoming nurse) by Ezella Goldmann, RN (offgoing nurse). Report included the following information SBAR, Intake/Output, Recent Results and Cardiac Rhythm sinus rhythm.

## 2019-04-04 NOTE — PROGRESS NOTES
Intern Progress Note [address] Patient: Selene Felder MRN: [id number]  CSN: [id number] YOB: 1931  Age: 80 y.o. Sex: female DOA: 3/31/2019 LOS:  LOS: 3 days   PCP: Josh Madrigal MD  
             
Subjective:  
 
Acute events: No acute events overnight. Mrs. Jaydon Aquino still reporting some pain with standing. Spoke with her about xrays of lumbar spine and knee with no acute fractures. I did let her know spine had small stable compression fractures which were not new. Discussed going to rehab, Mrs. Jaydon Aquino would like to be home by [facility] to play piano and organ. Rehab let her know they would see her today around 1pm when her daughter would be here. Brief ROS:  
General: feeling well, denies N/V  
CV: denies chest pain Chest: denies SOB  
MSK: right knee and hip pain Objective:  
  
Patient Vitals for the past 24 hrs: 
 Temp Pulse Resp BP SpO2  
04/04/19 0802 98.6 °F (37 °C)  18 138/73 94 % 04/04/19 0400 98.6 °F (37 °C) 85 16 144/68 96 % 04/04/19 0000 98.4 °F (36.9 °C) 73 16 155/73 94 % 04/03/19 2130 99.8 °F (37.7 °C) 87 16 144/72 95 % 04/03/19 1200 98.8 °F (37.1 °C) 79 16 132/72 96 % 04/03/19 0930     96 % Physical Exam:  
General: awake, sitting up in bedside chair, healthy, alert, smiling and in no apparent distress Cardiovascular: RRR w/o MRGs Respiratory: CTAB no rales, rhonchi, wheezes Abdomen: Soft, +BS, non-tendeder, Non-Distended Extremities: grossly moving all extremities, mild general weakness in lower extremities however she is able to extend and lift thighs off chair against resistance, no joint laxity in the knee Neuro: Cranial nerves grossly intact, grossly moving upper and lower extremities Skin: Negative for lesions, ulcers, rashes Lab/Data Reviewed: All lab results for the last 24 hours reviewed. CBC w/Diff Recent Labs 04/04/19 
2860 04/03/19 
0440 04/02/19 
8863 WBC 9.8 8.6 11.0 RBC 3.58* 3.50* 3.89* HGB 11.1* 10.7* 11.9*  
HCT 33.0* 32.1* 35.5  198 203 Chemistry Recent Labs 04/04/19 
1676 04/03/19 
0440 04/02/19 
1741 04/02/19 
8028 * 114*  --  112*  141  --  143  
K 3.4* 3.6 3.9  3.9 3.2*  
* 111*  --  111* CO2 25 24  --  24 BUN 11 11  --  13  
CREA 0.58* 0.57*  --  0.57* CA 7.8* 7.7*  --  7.6*  
MG  --   --   --  1.8 AGAP 6 6  --  8  
BUCR 19 19  --  23* Microbiology No results for input(s): SDES, CULT in the last 72 hours. No results for input(s): CULT in the last 72 hours. I/O Intake/Output Summary (Last 24 hours) at 4/4/2019 0328 Last data filed at 4/4/2019 0784 Gross per 24 hour Intake 120 ml Output 1650 ml Net -1530 ml Scheduled Medications Reviewed: 
Current Facility-Administered Medications Medication Dose Route Frequency  potassium chloride (K-DUR, KLOR-CON) SR tablet 20 mEq  20 mEq Oral Q2H  
 heparin (porcine) injection 5,000 Units  5,000 Units SubCUTAneous Q8H Assessment/Plan  
 
80 y.o. female with PMH HTN, osteoarthritis, now admitted with fall and found down.  
  
Rhabdomyolysis/Fall  
s/p fall. Patient was down for significant amount of time 3/30 approximately 9 am to 8 PM following a fall in her yard. Per pt and daughter, knee pain R>L likely cause for pt not being able to lift herself up. EKG in ED NSR negative for STEMI. Elevated CK to 5,595 with slight bump in troponins to 0.08 repeat labs were unchanged. Cr 1.3 on admission. UA positive for 30 protein, mod blood, small LE, and 4+ bacteria. Received approx 4,400 cc in ED. CK trended to 1200. troponins down to 0.06>0.03>0. 05. Patient feeling well, denies chest pain. X ray right knee, lumbar spine without acute fracture. R hip pain with knee flexion may indicate hip etiology for her pain with walking.   
- potential discharge to rehab possible today or tomorrow  
- telemetry - FU R hip radiograph - pain management with tylenol and voltaren gel as needed  
- daily cbc, bmp  
- fluids d/c  
- PT/OT, CM recommendations- recommends rehab  
- fall precautions UTI Started ceftriaxone in ED. Ucx positive for GNR, beta hemolytic strep. GNR pansensitive. Bcx NGTD 
- will switch to nitrofurantoin given susceptibility  
- FU urine cx  
- daily cbc  
  
Hypertension/ HLD 
- hold home losartan-hctz-50-12.5 mf daily  
- hold simvastatin  
 
  
Diet: Renal  
DVT Prophylaxis: Sub Q heparin Code Status: Full Point of Contact: Janeth Lagunas (daughter) [phone], ZVWQE [phone] 
  
Disposition and anticipated LOS: 2 midnights  
  
Everardo Mccormick MD PGY-1 
4/4/2019, 8:34 AM

## 2019-04-05 VITALS
WEIGHT: 192 LBS | BODY MASS INDEX: 29.1 KG/M2 | RESPIRATION RATE: 22 BRPM | DIASTOLIC BLOOD PRESSURE: 78 MMHG | SYSTOLIC BLOOD PRESSURE: 149 MMHG | OXYGEN SATURATION: 95 % | HEIGHT: 68 IN | TEMPERATURE: 98.8 F | HEART RATE: 101 BPM

## 2019-04-05 LAB
ANION GAP SERPL CALC-SCNC: 6 MMOL/L (ref 3–18)
BACTERIA SPEC CULT: ABNORMAL
BACTERIA SPEC CULT: ABNORMAL
BUN SERPL-MCNC: 14 MG/DL (ref 7–18)
BUN/CREAT SERPL: 24 (ref 12–20)
CALCIUM SERPL-MCNC: 7.9 MG/DL (ref 8.5–10.1)
CHLORIDE SERPL-SCNC: 110 MMOL/L (ref 100–108)
CO2 SERPL-SCNC: 26 MMOL/L (ref 21–32)
CREAT SERPL-MCNC: 0.59 MG/DL (ref 0.6–1.3)
ERYTHROCYTE [DISTWIDTH] IN BLOOD BY AUTOMATED COUNT: 13.3 % (ref 11.6–14.5)
GLUCOSE SERPL-MCNC: 111 MG/DL (ref 74–99)
HCT VFR BLD AUTO: 32.8 % (ref 35–45)
HGB BLD-MCNC: 10.8 G/DL (ref 12–16)
MCH RBC QN AUTO: 30.5 PG (ref 24–34)
MCHC RBC AUTO-ENTMCNC: 32.9 G/DL (ref 31–37)
MCV RBC AUTO: 92.7 FL (ref 74–97)
PLATELET # BLD AUTO: 223 K/UL (ref 135–420)
PMV BLD AUTO: 10.2 FL (ref 9.2–11.8)
POTASSIUM SERPL-SCNC: 3.8 MMOL/L (ref 3.5–5.5)
RBC # BLD AUTO: 3.54 M/UL (ref 4.2–5.3)
SERVICE CMNT-IMP: ABNORMAL
SODIUM SERPL-SCNC: 142 MMOL/L (ref 136–145)
WBC # BLD AUTO: 7 K/UL (ref 4.6–13.2)

## 2019-04-05 PROCEDURE — 97116 GAIT TRAINING THERAPY: CPT

## 2019-04-05 PROCEDURE — 36415 COLL VENOUS BLD VENIPUNCTURE: CPT

## 2019-04-05 PROCEDURE — 74011250636 HC RX REV CODE- 250/636: Performed by: STUDENT IN AN ORGANIZED HEALTH CARE EDUCATION/TRAINING PROGRAM

## 2019-04-05 PROCEDURE — 85027 COMPLETE CBC AUTOMATED: CPT

## 2019-04-05 PROCEDURE — 74011250637 HC RX REV CODE- 250/637: Performed by: STUDENT IN AN ORGANIZED HEALTH CARE EDUCATION/TRAINING PROGRAM

## 2019-04-05 PROCEDURE — 97530 THERAPEUTIC ACTIVITIES: CPT

## 2019-04-05 PROCEDURE — 80048 BASIC METABOLIC PNL TOTAL CA: CPT

## 2019-04-05 RX ORDER — NITROFURANTOIN MACROCRYSTALS 50 MG/1
50 CAPSULE ORAL EVERY 6 HOURS
Qty: 6 CAP | Refills: 0 | Status: SHIPPED | OUTPATIENT
Start: 2019-04-05

## 2019-04-05 RX ORDER — ACETAMINOPHEN 325 MG/1
650 TABLET ORAL
Qty: 60 TAB | Refills: 0 | Status: SHIPPED | OUTPATIENT
Start: 2019-04-05

## 2019-04-05 RX ORDER — DICLOFENAC SODIUM 10 MG/G
4 GEL TOPICAL
Qty: 100 G | Refills: 0 | Status: SHIPPED | OUTPATIENT
Start: 2019-04-05

## 2019-04-05 RX ADMIN — HEPARIN SODIUM 5000 UNITS: 5000 INJECTION INTRAVENOUS; SUBCUTANEOUS at 00:12

## 2019-04-05 RX ADMIN — NITROFURANTOIN MACROCRYSTALS 50 MG: 50 CAPSULE ORAL at 00:12

## 2019-04-05 RX ADMIN — HEPARIN SODIUM 5000 UNITS: 5000 INJECTION INTRAVENOUS; SUBCUTANEOUS at 09:14

## 2019-04-05 RX ADMIN — NITROFURANTOIN MACROCRYSTALS 50 MG: 50 CAPSULE ORAL at 13:28

## 2019-04-05 RX ADMIN — NITROFURANTOIN MACROCRYSTALS 50 MG: 50 CAPSULE ORAL at 05:06

## 2019-04-05 NOTE — PROGRESS NOTES
Discharge Planning: 
Pt and her daughter Alicia Hoover are agreeable to [facility] and pt signed Freedom of Choice form. Info has been uploaded to [facility] for placement consideration and Shannon Bell, Admissions Coordinator has been notified. 1230: [facility] has accepted pt for placement today. Discharge order noted for today. Patient has been accepted to  skilled nursing facility. Confirmed with Shannon Bell that bed is available today. Met with patient and her daughter and are agreeable to the transition plan today. Transport to facility has been arranged through [address] at 4:30pm Patient's discharge summary has been forwarded to skilled nursing facility via  [facility]clink. Bedside RN, Kyrie Hirsch, has been updated to the transition plan. Discharge information has been updated on the AVS.  Please call report to [phone] YOVANA Quinteros RN Care Management Pager: [phone]

## 2019-04-05 NOTE — PROGRESS NOTES
Intern Progress Note [address] Patient: Frank Krishna MRN: [id number]  CSN: [id number] YOB: 1931  Age: 80 y.o. Sex: female DOA: 3/31/2019 LOS:  LOS: 4 days   PCP: Nicolas Alonzo MD  
             
Subjective:  
 
Acute events: Pt declined from rehab yesterday afternoon which caused some frustration. Pt now considering SNFs. Daughter to return today. Pt reports some limited R hand motion, no pain. Brief ROS:  
General: feeling well, denies N/V  
CV: denies chest pain Chest: denies SOB  
MSK: right hand limited motion Objective:  
  
Patient Vitals for the past 24 hrs: 
 Temp Pulse Resp BP SpO2  
04/05/19 0800 97.6 °F (36.4 °C) 83 20 136/80 95 % 04/05/19 0424 98.2 °F (36.8 °C) 77 16 149/64 93 % 04/05/19 0000 100 °F (37.8 °C) 79 18 128/74 93 % 04/04/19 2000 99.3 °F (37.4 °C) 81 20 129/73 94 % 04/04/19 1503 98.2 °F (36.8 °C)  16 109/67 96 % 04/04/19 1157 98.1 °F (36.7 °C)  19 145/79 94 % Physical Exam:  
General: pt resting this am, in NAD, woke up, appears well Cardiovascular: RRR w/o MRGs Respiratory: CTAB no rales, rhonchi, wheezes Abdomen: Soft, +BS, non-tendeder, Non-Distended Extremities: grossly moving all extremities, normal ROM of right hand, can flex and extend digits, non tender to palpitation Neuro: Cranial nerves grossly intact, grossly moving upper and lower extremities Skin: Negative for lesions, ulcers, rashes Lab/Data Reviewed: All lab results for the last 24 hours reviewed. CBC w/Diff Recent Labs 04/05/19 
0245 04/04/19 
5149 04/03/19 
0440 WBC 7.0 9.8 8.6  
RBC 3.54* 3.58* 3.50* HGB 10.8* 11.1* 10.7* HCT 32.8* 33.0* 32.1*  
 219 198 Chemistry Recent Labs 04/05/19 
0245 04/04/19 
1645 04/04/19 
0358 04/03/19 
0440 *  --  121* 114*   --  141 141  
K 3.8 4.1 3.4* 3.6 *  --  110* 111* CO2 26  --  25 24 BUN 14  --  11 11 CREA 0.59*  --  0.58* 0.57* CA 7.9*  --  7.8* 7.7* AGAP 6  --  6 6 BUCR 24*  --  19 19 Microbiology No results for input(s): SDES, CULT in the last 72 hours. No results for input(s): CULT in the last 72 hours. I/O Intake/Output Summary (Last 24 hours) at 4/5/2019 0900 Last data filed at 4/5/2019 9265 Gross per 24 hour Intake  Output 1395 ml Net -1395 ml Scheduled Medications Reviewed: 
Current Facility-Administered Medications Medication Dose Route Frequency  nitrofurantoin (MACRODANTIN) capsule 50 mg  50 mg Oral Q6H  
 heparin (porcine) injection 5,000 Units  5,000 Units SubCUTAneous Q8H Assessment/Plan  
 
80 y.o. female with PMH HTN, osteoarthritis, now admitted with fall and found down.  
  
Rhabdomyolysis/Fall  
s/p fall at home. Per pt and daughter, knee pain R>L likely cause for pt not being able to lift herself up. Elevated CK to 5,595 with slight bump in troponins to 0.08 repeat labs were unchanged. Cr 1.3 on admission. UA positive for 30 protein, mod blood, small LE, and 4+ bacteria. Received approx 4,400 cc in ED. CK trended to 1200. troponins down to 0.06>0.03>0. 05. X ray right knee, lumbar spine without acute fracture. R hip xray neg for fracture. - potential discharge to SNF today  
- telemetry - pain management with tylenol and voltaren gel as needed  
- daily cbc, bmp  
- fluids d/c  
- PT/OT, CM recommendations- recommends rehab  
- fall precautions UTI Started ceftriaxone in ED. Ucx positive for GNR, beta hemolytic strep. GNR pansensitive. Bcx NGTD 
- macrodantin to end tomorrow 4/6 
- daily cbc  
  
Hypertension/ HLD 
- hold home losartan-hctz-50-12.5 mf daily  
- hold simvastatin  
  
Diet: Renal  
DVT Prophylaxis: Sub Q heparin Code Status: Full Point of Contact: Michelet Zhu (daughter) [phone], SZLFT [phone] 
  
Disposition and anticipated LOS: 2 midnights  
  
Marcos Bowie MD PGY-1 
4/5/2019, 8:34 AM

## 2019-04-05 NOTE — PROGRESS NOTES
Problem: Mobility Impaired (Adult and Pediatric) Goal: *Acute Goals and Plan of Care (Insert Text) Description Physical Therapy Goals Initiated 4/2/2019 and to be accomplished within 7 day(s) 1. Patient will move from supine to sit and sit to supine , scoot up and down and roll side to side in bed with supervision/set-up. 2.  Patient will transfer from bed to chair and chair to bed with minimal assistance/contact guard assist using the least restrictive device. 3.  Patient will perform sit to stand with minimal assistance/contact guard assist. 
4.  Patient will ambulate with minimal assistance/contact guard assist for 50 feet with the least restrictive device. 5.  Patient will ascend/descend 4 stairs with 1-2 handrail(s) with moderate assistance . Outcome: Progressing Towards Goal 
 PHYSICAL THERAPY TREATMENT Patient: Valeria Vegas (56 y.o. female) Date: 4/5/2019 Diagnosis: Rhabdomyolysis [M62.82] Rhabdomyolysis [M62.82] Rhabdomyolysis Precautions: Fall PLOF:Independent with mobility including gait no AD. ASSESSMENT: 
Pt found seated willing to work w/ therapy. Daughter in room at pt's side. Pt voiced disappointment w/ inability to attend IPR, however provided education and importance of cont therapy in SNF setting to return to PLOF. Pt also provided further education/encouragement w/ progression of therapy to work towards goals. Pt able to display good carry over of instructions from prior txs w/ alignment and sequencing and cont to req min A to stand 2/2 to weakness. Once standing, pt displays good supported and unsupported balance while donning gown reaching in and out of LOCO. Pt amb w/ RW this visit for a total of 60' w/o rest. Pt cont to display decreased step length (B) req cueing to increase pace and quality.  Pt req cont encouragement to further improve functional status during mobility training as pt tends to be self limiting and does not realize potential. Pt sat in [facility] post training and returned to room to recliner. Pt and daughter provided w/ further education and left in room w/ MD staff. Progression toward goals:  
?      Improving appropriately and progressing toward goals ? Improving slowly and progressing toward goals ? Not making progress toward goals and plan of care will be adjusted PLAN: 
Patient continues to benefit from skilled intervention to address the above impairments. Continue treatment per established plan of care. Discharge Recommendations:  Rehab Further Equipment Recommendations for Discharge:  rolling walker SUBJECTIVE:  
Patient stated ? I didn't get into the rehab facility. ? OBJECTIVE DATA SUMMARY:  
Critical Behavior: 
Neurologic State: Appropriate for age, Alert Orientation Level: Appropriate for age, Oriented X4 Cognition: Appropriate decision making, Appropriate safety awareness, Appropriate for age attention/concentration, Follows commands, Recognition of people/places Safety/Judgement: Fall prevention Functional Mobility Training: 
Transfers: 
Sit to Stand: Minimum assistance Stand to Sit: Minimum assistance Balance: 
Sitting: Intact Standing: Impaired; With support Standing - Static: Good Standing - Dynamic : Fair(+) Ambulation/Gait Training: 
Distance (ft): 60 Feet (ft) Assistive Device: Walker, rolling Ambulation - Level of Assistance: Contact guard assistance Gait Abnormalities: Decreased step clearance Base of Support: Center of gravity altered Speed/Marva: Slow Step Length: Right shortened;Left shortened Therapeutic Exercises:  
 
 
EXERCISE Sets Reps Active Active Assist  
Passive Self ROM Comments Ankle Pumps 1 10  ? ? ? ? Pain: 0/10 Activity Tolerance:  
Good Please refer to the flowsheet for vital signs taken during this treatment. After treatment:  
? Patient left in no apparent distress sitting up in chair ? Patient left in no apparent distress in bed ? Call bell left within reach ? Nursing notified ? Caregiver present ? Bed alarm activated ? SCDs applied COMMUNICATION/EDUCATION:  
?         Role of Physical Therapy in the acute care setting. ?         Fall prevention education was provided and the patient/caregiver indicated understanding. ? Patient/family have participated as able in working toward goals and plan of care. ?         Patient/family agree to work toward stated goals and plan of care. ?         Patient understands intent and goals of therapy, but is neutral about his/her participation. ? Patient is unable to participate in stated goals/plan of care: ongoing with therapy staff. ?         Other: 
 
   
Rosalba Randhawa PTA Time Calculation: 24 mins

## 2019-04-05 NOTE — DISCHARGE INSTRUCTIONS
Patient Education      Patient armband removed and shredded  MyChart Activation    Thank you for requesting access to [employer]. Please follow the instructions below to securely access and download your online medical record. [employer] allows you to send messages to your doctor, view your test results, renew your prescriptions, schedule appointments, and more. How Do I Sign Up? 1. In your internet browser, go to [email or web address]  2. Click on the First Time User? Click Here link in the Sign In box. You will be redirect to the New Member Sign Up page. 3. Enter your [employer] Access Code exactly as it appears below. You will not need to use this code after youve completed the sign-up process. If you do not sign up before the expiration date, you must request a new code. [employer] Access Code: [email or web address]  Expires: 2019 12:18 PM (This is the date your [employer] access code will )    4. Enter the last four digits of your Social Security Number (xxxx) and Date of Birth (mm/dd/yyyy) as indicated and click Submit. You will be taken to the next sign-up page. 5. Create a [employer] ID. This will be your [employer] login ID and cannot be changed, so think of one that is secure and easy to remember. 6. Create a [employer] password. You can change your password at any time. 7. Enter your Password Reset Question and Answer. This can be used at a later time if you forget your password. 8. Enter your e-mail address. You will receive e-mail notification when new information is available in [address] 9. Click Sign Up. You can now view and download portions of your medical record. 10. Click the Download Summary menu link to download a portable copy of your medical information. Additional Information    If you have questions, please visit the Frequently Asked Questions section of the [employer] website at [email or web address] Remember, [employer] is NOT to be used for urgent needs.  For medical emergencies, dial 911. As part of the discharge instructions, medications already given today were discussed with the patient. The next dose due of all ordered meds was highlighted as part of the medication discharge instructions. Discussed with the patient the importance of taking medications as directed, as well as the side effects and adverse reactions to medications ordered. DISCHARGE SUMMARY from Nurse    PATIENT INSTRUCTIONS:    After general anesthesia or intravenous sedation, for 24 hours or while taking prescription Narcotics:  · Limit your activities  · Do not drive and operate hazardous machinery  · Do not make important personal or business decisions  · Do  not drink alcoholic beverages  · If you have not urinated within 8 hours after discharge, please contact your surgeon on call. Report the following to your surgeon:  · Excessive pain, swelling, redness or odor of or around the surgical area  · Temperature over 100.5  · Nausea and vomiting lasting longer than 4 hours or if unable to take medications  · Any signs of decreased circulation or nerve impairment to extremity: change in color, persistent  numbness, tingling, coldness or increase pain  · Any questions    What to do at Home:  Recommended activity: Activity as tolerated,     If you experience any of the following symptoms shortness of breath, chest pain, signs of infections- fever chills,, please follow up with ED or Primary Md. *  Please give a list of your current medications to your Primary Care Provider. *  Please update this list whenever your medications are discontinued, doses are      changed, or new medications (including over-the-counter products) are added. *  Please carry medication information at all times in case of emergency situations.     These are general instructions for a healthy lifestyle:    No smoking/ No tobacco products/ Avoid exposure to second hand smoke  Surgeon General's Warning:  Quitting smoking now greatly reduces serious risk to your health. Obesity, smoking, and sedentary lifestyle greatly increases your risk for illness    A healthy diet, regular physical exercise & weight monitoring are important for maintaining a healthy lifestyle    You may be retaining fluid if you have a history of heart failure or if you experience any of the following symptoms:  Weight gain of 3 pounds or more overnight or 5 pounds in a week, increased swelling in our hands or feet or shortness of breath while lying flat in bed. Please call your doctor as soon as you notice any of these symptoms; do not wait until your next office visit. Recognize signs and symptoms of STROKE:    F-face looks uneven    A-arms unable to move or move unevenly    S-speech slurred or non-existent    T-time-call 911 as soon as signs and symptoms begin-DO NOT go       Back to bed or wait to see if you get better-TIME IS BRAIN. Warning Signs of HEART ATTACK     Call 911 if you have these symptoms:   Chest discomfort. Most heart attacks involve discomfort in the center of the chest that lasts more than a few minutes, or that goes away and comes back. It can feel like uncomfortable pressure, squeezing, fullness, or pain.  Discomfort in other areas of the upper body. Symptoms can include pain or discomfort in one or both arms, the back, neck, jaw, or stomach.  Shortness of breath with or without chest discomfort.  Other signs may include breaking out in a cold sweat, nausea, or lightheadedness. Don't wait more than five minutes to call 911 - MINUTES MATTER! Fast action can save your life. Calling 911 is almost always the fastest way to get lifesaving treatment. Emergency Medical Services staff can begin treatment when they arrive -- up to an hour sooner than if someone gets to the hospital by car. The discharge information has been reviewed with the patient. The patient verbalized understanding.   Discharge medications reviewed with the patient and appropriate educational materials and side effects teaching were provided. ___________________________________________________________________________________________________________________________________  Rhabdomyolysis: Care Instructions  Your Care Instructions    When you have rhabdomyolysis (say \"rgn-ezo-du-AH-capo-suss\"), dying muscle cells cause toxins to build up in the blood. If not treated, it can cause life-threatening damage to the body's organs. It can be caused by many things, such as severe muscle injury, some medicines (like statins), the flu, and certain blood infections. Symptoms may include weak muscles, pain, stiffness, fever, and nausea. Your urine may also be dark. You will get treatment in the hospital. If possible, the doctor will stop the cause of muscle cell death. The doctor will take steps to protect your organs. You may have to stop taking certain medicines if they are the cause of the problem. You will also get treatment to help the kidneys remove the toxins from your blood. This includes plenty of fluids. You may get fluids through a vein (by IV). You may also need dialysis. Follow-up care is a key part of your treatment and safety. Be sure to make and go to all appointments, and call your doctor if you are having problems. It's also a good idea to know your test results and keep a list of the medicines you take. How can you care for yourself at home? · Take pain medicines exactly as directed. ? If the doctor gave you a prescription medicine for pain, take it as prescribed. ? If you are not taking a prescription pain medicine, ask your doctor if you can take an over-the-counter medicine. · Talk to your doctor about whether you need to stop taking any medicines. Follow your doctor's instructions about stopping medicines. · Drink plenty of fluids, enough so that your urine is light yellow or clear like water.  If you have kidney, heart, or liver disease and have to limit fluids, talk with your doctor before you increase the amount of fluids you drink. When should you call for help? Call your doctor now or seek immediate medical care if:    · You have new or worse muscle pain.     · You have less urine than normal or no urine.     · You have new swelling in your arms or feet.     · You have blood in your urine.    Watch closely for changes in your health, and be sure to contact your doctor if you do not get better as expected. Where can you learn more? Go to http://montana-osmany.info/. Enter F129 in the search box to learn more about \"Rhabdomyolysis: Care Instructions. \"  Current as of: March 14, 2018  Content Version: 11.9  © 2631-4691 [employer]. Care instructions adapted under license by [employer] (which disclaims liability or warranty for this information). If you have questions about a medical condition or this instruction, always ask your healthcare professional. [place] [address] any warranty or liability for your use of this information. Patient Education        Urinary Tract Infection in Women: Care Instructions  Your Care Instructions    A urinary tract infection, or UTI, is a general term for an infection anywhere between the kidneys and the urethra (where urine comes out). Most UTIs are bladder infections. They often cause pain or burning when you urinate. UTIs are caused by bacteria and can be cured with antibiotics. Be sure to complete your treatment so that the infection goes away. Follow-up care is a key part of your treatment and safety. Be sure to make and go to all appointments, and call your doctor if you are having problems. It's also a good idea to know your test results and keep a list of the medicines you take. How can you care for yourself at home? · Take your antibiotics as directed. Do not stop taking them just because you feel better.  You need to take the full course of antibiotics. · Drink extra water and other fluids for the next day or two. This may help wash out the bacteria that are causing the infection. (If you have kidney, heart, or liver disease and have to limit fluids, talk with your doctor before you increase your fluid intake.)  · Avoid drinks that are carbonated or have caffeine. They can irritate the bladder. · Urinate often. Try to empty your bladder each time. · To relieve pain, take a hot bath or lay a heating pad set on low over your lower belly or genital area. Never go to sleep with a heating pad in place. To prevent UTIs  · Drink plenty of water each day. This helps you urinate often, which clears bacteria from your system. (If you have kidney, heart, or liver disease and have to limit fluids, talk with your doctor before you increase your fluid intake.)  · Urinate when you need to. · Urinate right after you have sex. · Change sanitary pads often. · Avoid douches, bubble baths, feminine hygiene sprays, and other feminine hygiene products that have deodorants. · After going to the bathroom, wipe from front to back. When should you call for help? Call your doctor now or seek immediate medical care if:    · Symptoms such as fever, chills, nausea, or vomiting get worse or appear for the first time.     · You have new pain in your back just below your rib cage. This is called flank pain.     · There is new blood or pus in your urine.     · You have any problems with your antibiotic medicine.    Watch closely for changes in your health, and be sure to contact your doctor if:    · You are not getting better after taking an antibiotic for 2 days.     · Your symptoms go away but then come back. Where can you learn more? Go to http://montana-osmany.info/. Enter O404 in the search box to learn more about \"Urinary Tract Infection in Women: Care Instructions. \"  Current as of: March 20, 2018  Content Version: 11.9  © 3997-6526 [employer]. Care instructions adapted under license by [employer] (which disclaims liability or warranty for this information). If you have questions about a medical condition or this instruction, always ask your healthcare professional. [address] any warranty or liability for your use of this information.

## 2019-04-05 NOTE — DISCHARGE SUMMARY
Discharge Summary  [address] [facility]      Patient: Shubham Tyler Age: 80 y.o. Sex: female  : 1931    MRN: [id number]      DOA: 3/31/2019      Discharge Date: 19      Catherine Del Rosario MD      PCP: Conor Can MD        ================================================================    Reason for Admission:   Rhabdomyolysis [M62.82]   Urinary Tract Infection  Osteoarthritis   Hypertension     Discharge Diagnoses:   Rhabdomyolysis [M62.82] (resolved)   Urinary Tract Infection (stable/treated)   Osteoarthritis (stable)   Hypertension (stable)     Important notes to PCP/ follow-up studies and evaluations   - patient to complete macrodantin for UTI course as outpatient   - lumbar series, right hip, right knee x rays negative for acute fractures    Pending labs and studies:  None    Operative Procedures:   None     Discharge Medications:    Current Discharge Medication List      START taking these medications    Details   nitrofurantoin (MACRODANTIN) 50 mg capsule Take 1 Cap by mouth every six (6) hours. Qty: 6 Cap, Refills: 0      acetaminophen (TYLENOL) 325 mg tablet Take 2 Tabs by mouth every four (4) hours as needed for Pain. Qty: 60 Tab, Refills: 0      diclofenac (VOLTAREN) 1 % gel Apply 4 g to affected area four (4) times daily as needed for Pain. Indications: Osteoarthritis of the Knee  Qty: 100 g, Refills: 0         CONTINUE these medications which have NOT CHANGED    Details   atorvastatin (LIPITOR) 20 mg tablet Take 20 mg by mouth daily. aspirin delayed-release 81 mg tablet Take  by mouth daily. raloxifene (EVISTA) 60 mg tablet Take  by mouth daily. losartan-hydrochlorothiazide (HYZAAR) 50-12.5 mg per tablet Take 1 Tab by mouth daily.              Disposition: SNF    Consultants:    None     Brief Hospital Course (including pertinent history and physical findings)  Shubham Tyler is a 80year old female with past medical history of hypertension, osteoarthritis, now presenting with complaint of fall. Rhabdomyolysis/Fall/ Osteoarthritis   On the 3/30/19 Mrs. Ciara Marmolejo had a fall outside of her home approximately 9 am. She was unable to get up and was subsequently on the ground until 8 pm. She reported pain in her knees which caused her unable to stand. She was found by a neighbor who was checking on her and brought to the emergency room. In the emergency room. Mrs. Nino EKG was normal, creatinine kinase was elevated to 5,900 and troponins to 0.08. Repeat labs were unchanged. Her blood counts and chemistries were otherwise unremarkable. Urinalysis  concerning for urinary tract infection and she was started on antibiotics. While in the emergency department Mrs. Ciara Marmolejo was given fluids and admitted for further monitoring. The creatinine kinase trended down as did troponin's and a repeat EKG was normal. Mrs. Ciara Marmolejo had radiographs of her right knee, hip, and lumbar spine. With exception of right knee effusion, soft tissue swelling, and minimal stable compression fractures of L1/L2, there were no fractures. Mrs. Ciara Marmolejo continued to experience pain with standing and had difficulty ambulating without assistance. She was evaluated by physical therapy and occupational therapy who recommended rehab. Mrs. Ciara Marmolejo was not accepted to inpatient rehab however was accepted to [address] [place] [address]. She was stable for discharge on 4/5/19. Urinary Tract Infection   Urinalysis on admission concerning for urinary tract infection. Mrs. Ciara Marmolejo did experience some urinary frequency on admission which likely was setting of fluid resuscitation, otherwise she did not complain of dysuria. Mrs. Ciara Marmolejo was initially treated with ceftriaxone. Urine culture positive for pansensitive E. Coli. She was started on macrodantin and discharged with  One additional day of treatment. Osteoarthritis   Mrs. Ciara Marmolejo reported knee pain, right greater than left during admission. She has osteoarthritis at baseline however in setting of recent fall, likely had a contusion of her right knee. X rays were negative for acute fracture. Summarized key findings and results (labs, imaging studies, ECHO, cardiac cath, endoscopies, etc):    CBC w/Diff    Lab Results   Component Value Date/Time    WBC 7.0 04/05/2019 02:45 AM    HGB 10.8 (L) 04/05/2019 02:45 AM    HCT 32.8 (L) 04/05/2019 02:45 AM    PLATELET 710 15/09/2344 02:45 AM    MCV 92.7 04/05/2019 02:45 AM           Chemistry    Lab Results   Component Value Date/Time    Sodium 142 04/05/2019 02:45 AM    Potassium 3.8 04/05/2019 02:45 AM    Chloride 110 (H) 04/05/2019 02:45 AM    CO2 26 04/05/2019 02:45 AM    Anion gap 6 04/05/2019 02:45 AM    Glucose 111 (H) 04/05/2019 02:45 AM    BUN 14 04/05/2019 02:45 AM    Creatinine 0.59 (L) 04/05/2019 02:45 AM    BUN/Creatinine ratio 24 (H) 04/05/2019 02:45 AM    GFR est AA >60 04/05/2019 02:45 AM    GFR est non-AA >60 04/05/2019 02:45 AM    Calcium 7.9 (L) 04/05/2019 02:45 AM         Right Knee XR  4/2/19  No evidence for acute fracture  Moderate joint effusion, new or increased since prior exam.  Mild superficial soft tissue swelling anteriorly, new since prior exam.  Redemonstrated moderate osteoarthrosis and chondrocalcinosis. 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 New grade 1 spondylolisthesis L5/S1. Minimal stable compression fractures of the superior endplate of L1 and L2. Right Hip XR  4/4/19   No acute fracture or subluxation. Minimal osteoarthrosis. Nonspecific subtle soft tissue calcification.     Functional status and cognitive function:    DME: Fer[facility] Dew   Ambulates with: Walker  Status: alert, cooperative, no distress, appears stated age    Diet: General Diet    Code status and advanced care plan: Full  [facility] of Contact: Violette Verduzco (daughter) [phone], [facility] [phone]      Patient Education:  Patient was educated on the following topics prior to discharge: Rhabdomyolysis, Urinary tract infection     Follow-up:   Follow-up Information     Follow up With Specialties Details Why Contact Info    Laurena Lesch, MD [facility] In 1 week  [address] [facility]  [phone]              ================================================================  Viridiana Neri MD PGY-1  [address] [facility]  4/5/2019, 1:58 PM

## 2019-04-06 NOTE — ROUTINE PROCESS
Ambulance called and say that they would be late, made the family aware. [id number] - ambulance here and patient was taken to [facility].

## 2019-04-07 LAB
BACTERIA SPEC CULT: NORMAL
BACTERIA SPEC CULT: NORMAL
SERVICE CMNT-IMP: NORMAL
SERVICE CMNT-IMP: NORMAL

## 2019-04-10 ENCOUNTER — PATIENT OUTREACH (OUTPATIENT)
Dept: CASE MANAGEMENT | Age: 84
End: 2019-04-10

## 2019-04-15 NOTE — PROGRESS NOTES
Community Care Team Documentation for Patient in [facility]     Patient discharged from [facility] 3/31/2019 - 4/5/2019 to [facility], on 4/5/2019. Hospital Discharge diagnosis:  Rhabdomyolysis [M62.82] (resolved)   Urinary Tract Infection (stable/treated)   Osteoarthritis (stable)   Hypertension (stable)     SNF Attending Provider:  Ambika Fan    Anticipated discharge date from SNF:        PCP : Mayra Walton MD    Nurse Navigator: LORI    [facility] Team rounds completed, updates provided by facility. Full Code  4/10 labs pending results. Possible gout. Hand is red and swollen. PT/OT: Pain and fatigue interfering with pt progress. Social: patient lives in a single family home alone. Her daughter Jordan Phan lives next door    Low Risk            3       Total Score        3 Has Seen PCP in Last 6 Months (Yes=3, No=0)        Criteria that do not apply:    . Living with Significant Other. Assisted Living. LTAC. SNF. or   Rehab    Patient Length of Stay (>5 days = 3)    IP Visits Last 12 Months (1-3=4, 4=9, >4=11)    Pt.  Coverage (Medicare=5 , Medicaid, or Self-Pay=4)    Charlson Comorbidity Score (Age + Comorbid Conditions)      Active Ambulatory Problems     Diagnosis Date Noted    Other premature beats 05/13/2013    Essential hypertension, benign 05/13/2013    Other and unspecified hyperlipidemia 05/13/2013    Rhabdomyolysis 04/01/2019     Resolved Ambulatory Problems     Diagnosis Date Noted    No Resolved Ambulatory Problems     Past Medical History:   Diagnosis Date    Essential hypertension     Hyperlipidemia     Other premature beats 5/13/2013

## 2019-04-17 ENCOUNTER — PATIENT OUTREACH (OUTPATIENT)
Dept: CASE MANAGEMENT | Age: 84
End: 2019-04-17

## 2019-04-24 ENCOUNTER — PATIENT OUTREACH (OUTPATIENT)
Dept: CASE MANAGEMENT | Age: 84
End: 2019-04-24

## 2019-05-01 ENCOUNTER — PATIENT OUTREACH (OUTPATIENT)
Dept: CASE MANAGEMENT | Age: 84
End: 2019-05-01

## 2019-05-08 ENCOUNTER — PATIENT OUTREACH (OUTPATIENT)
Dept: CASE MANAGEMENT | Age: 84
End: 2019-05-08

## 2019-05-08 NOTE — PROGRESS NOTES
Community Care Team Documentation for Patient in [facility]     Patient discharged from [facility] 3/31/2019 - 4/5/2019 to [facility], [facility], on 4/5/2019. Hospital Discharge diagnosis:  Rhabdomyolysis [M62.82] (resolved)   Urinary Tract Infection (stable/treated)   Osteoarthritis (stable)   Hypertension (stable)     SNF Attending Provider:  Reta Herrmann    Anticipated discharge date from SNF: D       PCP : Leydi Lerner MD    Nurse Navigator: LORI    [facility] Team rounds completed, updates provided by facility. Full Code  PT/OT: progressing. Home with dtr. No dc date. Possibly by end of week. Social: patient lives in a single family home alone. Her daughter [facility] (1-) lives next door    Low Risk            3       Total Score        3 Has Seen PCP in Last 6 Months (Yes=3, No=0)        Criteria that do not apply:    . Living with Significant Other. Assisted Living. LTAC. SNF. or   Rehab    Patient Length of Stay (>5 days = 3)    IP Visits Last 12 Months (1-3=4, 4=9, >4=11)    Pt.  Coverage (Medicare=5 , Medicaid, or Self-Pay=4)    Charlson Comorbidity Score (Age + Comorbid Conditions)      Active Ambulatory Problems     Diagnosis Date Noted    Other premature beats 05/13/2013    Essential hypertension, benign 05/13/2013    Other and unspecified hyperlipidemia 05/13/2013    Rhabdomyolysis 04/01/2019     Resolved Ambulatory Problems     Diagnosis Date Noted    No Resolved Ambulatory Problems     Past Medical History:   Diagnosis Date    Essential hypertension     Hyperlipidemia     Other premature beats 5/13/2013

## 2019-05-08 NOTE — PROGRESS NOTES
Community Care Team Documentation for Patient in [facility]     Patient discharged from [facility] 3/31/2019 - 4/5/2019 to [facility], [facility], on 4/5/2019. Hospital Discharge diagnosis:  Rhabdomyolysis [M62.82] (resolved)   Urinary Tract Infection (stable/treated)   Osteoarthritis (stable)   Hypertension (stable)     SNF Attending Provider:  Betito Cook    Anticipated discharge date from SNF: 5/13/2019       PCP : Areli Barker MD    Nurse Navigator: [facility] Team rounds completed, updates provided by facility. Full Code  PT/OT: progressing, making gains  Discharge to home 5/13/2019 with [facility]  Social: patient lives in a single family home alone. Her daughter Charo Records lives next door    Low Risk            3       Total Score        3 Has Seen PCP in Last 6 Months (Yes=3, No=0)        Criteria that do not apply:    . Living with Significant Other. Assisted Living. LTAC. SNF. or   Rehab    Patient Length of Stay (>5 days = 3)    IP Visits Last 12 Months (1-3=4, 4=9, >4=11)    Pt.  Coverage (Medicare=5 , Medicaid, or Self-Pay=4)    Charlson Comorbidity Score (Age + Comorbid Conditions)      Active Ambulatory Problems     Diagnosis Date Noted    Other premature beats 05/13/2013    Essential hypertension, benign 05/13/2013    Other and unspecified hyperlipidemia 05/13/2013    Rhabdomyolysis 04/01/2019     Resolved Ambulatory Problems     Diagnosis Date Noted    No Resolved Ambulatory Problems     Past Medical History:   Diagnosis Date    Essential hypertension     Hyperlipidemia     Other premature beats 5/13/2013

## 2019-05-13 ENCOUNTER — HOME HEALTH ADMISSION (OUTPATIENT)
Dept: HOME HEALTH SERVICES | Facility: HOME HEALTH | Age: 84
End: 2019-05-13
Payer: MEDICARE

## 2019-05-15 ENCOUNTER — PATIENT OUTREACH (OUTPATIENT)
Dept: CASE MANAGEMENT | Age: 84
End: 2019-05-15

## 2019-05-15 ENCOUNTER — HOME CARE VISIT (OUTPATIENT)
Dept: SCHEDULING | Facility: HOME HEALTH | Age: 84
End: 2019-05-15
Payer: MEDICARE

## 2019-05-15 VITALS
TEMPERATURE: 98.2 F | HEART RATE: 77 BPM | DIASTOLIC BLOOD PRESSURE: 70 MMHG | OXYGEN SATURATION: 93 % | SYSTOLIC BLOOD PRESSURE: 130 MMHG | RESPIRATION RATE: 16 BRPM

## 2019-05-15 PROCEDURE — 3331090002 HH PPS REVENUE DEBIT

## 2019-05-15 PROCEDURE — 400013 HH SOC

## 2019-05-15 PROCEDURE — G0299 HHS/HOSPICE OF RN EA 15 MIN: HCPCS

## 2019-05-15 PROCEDURE — 3331090001 HH PPS REVENUE CREDIT

## 2019-05-15 NOTE — PROGRESS NOTES
Community Care Team Documentation for Patient in [facility]     Patient discharged from [facility] 3/31/2019 - 4/5/2019 to [facility], on 4/5/2019. Hospital Discharge diagnosis:  Rhabdomyolysis [M62.82] (resolved)   Urinary Tract Infection (stable/treated)   Osteoarthritis (stable)   Hypertension (stable)     SNF Attending Provider:  Terri Velasco    Anticipated discharge date from SNF: 5/13/2019       PCP : Bert Al MD    Nurse Navigator: LORI    [facility] Team rounds completed, updates provided by facility. Full Code  Discharge to home 5/13/2019 with [facility]      Low Risk            3       Total Score        3 Has Seen PCP in Last 6 Months (Yes=3, No=0)        Criteria that do not apply:    . Living with Significant Other. Assisted Living. LTAC. SNF. or   Rehab    Patient Length of Stay (>5 days = 3)    IP Visits Last 12 Months (1-3=4, 4=9, >4=11)    Pt.  Coverage (Medicare=5 , Medicaid, or Self-Pay=4)    Charlson Comorbidity Score (Age + Comorbid Conditions)      Active Ambulatory Problems     Diagnosis Date Noted    Other premature beats 05/13/2013    Essential hypertension, benign 05/13/2013    Other and unspecified hyperlipidemia 05/13/2013    Rhabdomyolysis 04/01/2019     Resolved Ambulatory Problems     Diagnosis Date Noted    No Resolved Ambulatory Problems     Past Medical History:   Diagnosis Date    Essential hypertension     Hyperlipidemia     Other premature beats 5/13/2013

## 2019-05-16 ENCOUNTER — HOME CARE VISIT (OUTPATIENT)
Dept: SCHEDULING | Facility: HOME HEALTH | Age: 84
End: 2019-05-16
Payer: MEDICARE

## 2019-05-16 VITALS
DIASTOLIC BLOOD PRESSURE: 78 MMHG | SYSTOLIC BLOOD PRESSURE: 112 MMHG | HEART RATE: 74 BPM | TEMPERATURE: 97.5 F | OXYGEN SATURATION: 93 %

## 2019-05-16 PROCEDURE — 3331090001 HH PPS REVENUE CREDIT

## 2019-05-16 PROCEDURE — G0152 HHCP-SERV OF OT,EA 15 MIN: HCPCS

## 2019-05-16 PROCEDURE — 3331090002 HH PPS REVENUE DEBIT

## 2019-05-17 ENCOUNTER — HOME CARE VISIT (OUTPATIENT)
Dept: HOME HEALTH SERVICES | Facility: HOME HEALTH | Age: 84
End: 2019-05-17
Payer: MEDICARE

## 2019-05-17 ENCOUNTER — HOME CARE VISIT (OUTPATIENT)
Dept: SCHEDULING | Facility: HOME HEALTH | Age: 84
End: 2019-05-17
Payer: MEDICARE

## 2019-05-17 PROCEDURE — 3331090001 HH PPS REVENUE CREDIT

## 2019-05-17 PROCEDURE — 3331090002 HH PPS REVENUE DEBIT

## 2019-05-18 PROCEDURE — 3331090002 HH PPS REVENUE DEBIT

## 2019-05-18 PROCEDURE — 3331090001 HH PPS REVENUE CREDIT

## 2019-05-19 PROCEDURE — 3331090001 HH PPS REVENUE CREDIT

## 2019-05-19 PROCEDURE — 3331090002 HH PPS REVENUE DEBIT

## 2019-05-20 ENCOUNTER — HOME CARE VISIT (OUTPATIENT)
Dept: SCHEDULING | Facility: HOME HEALTH | Age: 84
End: 2019-05-20
Payer: MEDICARE

## 2019-05-20 VITALS
DIASTOLIC BLOOD PRESSURE: 60 MMHG | SYSTOLIC BLOOD PRESSURE: 110 MMHG | RESPIRATION RATE: 16 BRPM | HEART RATE: 101 BPM | OXYGEN SATURATION: 95 %

## 2019-05-20 PROCEDURE — G0151 HHCP-SERV OF PT,EA 15 MIN: HCPCS

## 2019-05-20 PROCEDURE — G0156 HHCP-SVS OF AIDE,EA 15 MIN: HCPCS

## 2019-05-20 PROCEDURE — 3331090002 HH PPS REVENUE DEBIT

## 2019-05-20 PROCEDURE — 3331090001 HH PPS REVENUE CREDIT

## 2019-05-21 ENCOUNTER — HOME CARE VISIT (OUTPATIENT)
Dept: SCHEDULING | Facility: HOME HEALTH | Age: 84
End: 2019-05-21
Payer: MEDICARE

## 2019-05-21 PROCEDURE — 3331090001 HH PPS REVENUE CREDIT

## 2019-05-21 PROCEDURE — G0158 HHC OT ASSISTANT EA 15: HCPCS

## 2019-05-21 PROCEDURE — 3331090002 HH PPS REVENUE DEBIT

## 2019-05-22 ENCOUNTER — HOME CARE VISIT (OUTPATIENT)
Dept: SCHEDULING | Facility: HOME HEALTH | Age: 84
End: 2019-05-22
Payer: MEDICARE

## 2019-05-22 ENCOUNTER — HOME CARE VISIT (OUTPATIENT)
Dept: HOME HEALTH SERVICES | Facility: HOME HEALTH | Age: 84
End: 2019-05-22
Payer: MEDICARE

## 2019-05-22 VITALS
RESPIRATION RATE: 14 BRPM | DIASTOLIC BLOOD PRESSURE: 84 MMHG | SYSTOLIC BLOOD PRESSURE: 132 MMHG | TEMPERATURE: 98.7 F | HEART RATE: 87 BPM | OXYGEN SATURATION: 98 %

## 2019-05-22 PROCEDURE — 3331090001 HH PPS REVENUE CREDIT

## 2019-05-22 PROCEDURE — G0156 HHCP-SVS OF AIDE,EA 15 MIN: HCPCS

## 2019-05-22 PROCEDURE — 3331090002 HH PPS REVENUE DEBIT

## 2019-05-23 ENCOUNTER — HOME CARE VISIT (OUTPATIENT)
Dept: SCHEDULING | Facility: HOME HEALTH | Age: 84
End: 2019-05-23
Payer: MEDICARE

## 2019-05-23 VITALS
SYSTOLIC BLOOD PRESSURE: 128 MMHG | TEMPERATURE: 98.4 F | OXYGEN SATURATION: 98 % | DIASTOLIC BLOOD PRESSURE: 76 MMHG | HEART RATE: 86 BPM

## 2019-05-23 PROCEDURE — G0158 HHC OT ASSISTANT EA 15: HCPCS

## 2019-05-23 PROCEDURE — G0157 HHC PT ASSISTANT EA 15: HCPCS

## 2019-05-23 PROCEDURE — 3331090002 HH PPS REVENUE DEBIT

## 2019-05-23 PROCEDURE — 3331090001 HH PPS REVENUE CREDIT

## 2019-05-24 ENCOUNTER — HOME CARE VISIT (OUTPATIENT)
Dept: SCHEDULING | Facility: HOME HEALTH | Age: 84
End: 2019-05-24
Payer: MEDICARE

## 2019-05-24 VITALS
HEART RATE: 80 BPM | SYSTOLIC BLOOD PRESSURE: 102 MMHG | TEMPERATURE: 98 F | OXYGEN SATURATION: 94 % | DIASTOLIC BLOOD PRESSURE: 72 MMHG

## 2019-05-24 PROCEDURE — 3331090002 HH PPS REVENUE DEBIT

## 2019-05-24 PROCEDURE — G0156 HHCP-SVS OF AIDE,EA 15 MIN: HCPCS

## 2019-05-24 PROCEDURE — 3331090001 HH PPS REVENUE CREDIT

## 2019-05-25 PROCEDURE — 3331090002 HH PPS REVENUE DEBIT

## 2019-05-25 PROCEDURE — 3331090001 HH PPS REVENUE CREDIT

## 2019-05-26 PROCEDURE — 3331090001 HH PPS REVENUE CREDIT

## 2019-05-26 PROCEDURE — 3331090002 HH PPS REVENUE DEBIT

## 2019-05-27 PROCEDURE — 3331090001 HH PPS REVENUE CREDIT

## 2019-05-27 PROCEDURE — 3331090002 HH PPS REVENUE DEBIT

## 2019-05-28 ENCOUNTER — HOME CARE VISIT (OUTPATIENT)
Dept: SCHEDULING | Facility: HOME HEALTH | Age: 84
End: 2019-05-28
Payer: MEDICARE

## 2019-05-28 PROCEDURE — 3331090002 HH PPS REVENUE DEBIT

## 2019-05-28 PROCEDURE — 3331090001 HH PPS REVENUE CREDIT

## 2019-05-28 PROCEDURE — G0156 HHCP-SVS OF AIDE,EA 15 MIN: HCPCS

## 2019-05-29 ENCOUNTER — HOME CARE VISIT (OUTPATIENT)
Dept: SCHEDULING | Facility: HOME HEALTH | Age: 84
End: 2019-05-29
Payer: MEDICARE

## 2019-05-29 VITALS
DIASTOLIC BLOOD PRESSURE: 76 MMHG | OXYGEN SATURATION: 98 % | SYSTOLIC BLOOD PRESSURE: 118 MMHG | TEMPERATURE: 98.7 F | HEART RATE: 87 BPM | RESPIRATION RATE: 14 BRPM

## 2019-05-29 VITALS
SYSTOLIC BLOOD PRESSURE: 118 MMHG | DIASTOLIC BLOOD PRESSURE: 68 MMHG | HEART RATE: 74 BPM | OXYGEN SATURATION: 96 % | TEMPERATURE: 97.8 F

## 2019-05-29 PROCEDURE — 3331090001 HH PPS REVENUE CREDIT

## 2019-05-29 PROCEDURE — G0158 HHC OT ASSISTANT EA 15: HCPCS

## 2019-05-29 PROCEDURE — G0157 HHC PT ASSISTANT EA 15: HCPCS

## 2019-05-29 PROCEDURE — 3331090002 HH PPS REVENUE DEBIT

## 2019-05-30 ENCOUNTER — HOME CARE VISIT (OUTPATIENT)
Dept: SCHEDULING | Facility: HOME HEALTH | Age: 84
End: 2019-05-30
Payer: MEDICARE

## 2019-05-30 VITALS
SYSTOLIC BLOOD PRESSURE: 128 MMHG | HEART RATE: 87 BPM | OXYGEN SATURATION: 98 % | RESPIRATION RATE: 16 BRPM | TEMPERATURE: 98.4 F | DIASTOLIC BLOOD PRESSURE: 84 MMHG

## 2019-05-30 PROCEDURE — 3331090002 HH PPS REVENUE DEBIT

## 2019-05-30 PROCEDURE — G0156 HHCP-SVS OF AIDE,EA 15 MIN: HCPCS

## 2019-05-30 PROCEDURE — 3331090001 HH PPS REVENUE CREDIT

## 2019-05-30 PROCEDURE — G0158 HHC OT ASSISTANT EA 15: HCPCS

## 2019-05-31 ENCOUNTER — HOME CARE VISIT (OUTPATIENT)
Dept: SCHEDULING | Facility: HOME HEALTH | Age: 84
End: 2019-05-31
Payer: MEDICARE

## 2019-05-31 PROCEDURE — 3331090001 HH PPS REVENUE CREDIT

## 2019-05-31 PROCEDURE — G0157 HHC PT ASSISTANT EA 15: HCPCS

## 2019-05-31 PROCEDURE — G0156 HHCP-SVS OF AIDE,EA 15 MIN: HCPCS

## 2019-05-31 PROCEDURE — 3331090002 HH PPS REVENUE DEBIT

## 2019-06-01 VITALS
OXYGEN SATURATION: 95 % | SYSTOLIC BLOOD PRESSURE: 120 MMHG | TEMPERATURE: 97.7 F | HEART RATE: 75 BPM | DIASTOLIC BLOOD PRESSURE: 72 MMHG

## 2019-06-01 PROCEDURE — 3331090001 HH PPS REVENUE CREDIT

## 2019-06-01 PROCEDURE — 3331090002 HH PPS REVENUE DEBIT

## 2019-06-02 PROCEDURE — 3331090001 HH PPS REVENUE CREDIT

## 2019-06-02 PROCEDURE — 3331090002 HH PPS REVENUE DEBIT

## 2019-06-03 ENCOUNTER — HOME CARE VISIT (OUTPATIENT)
Dept: SCHEDULING | Facility: HOME HEALTH | Age: 84
End: 2019-06-03
Payer: MEDICARE

## 2019-06-03 VITALS
DIASTOLIC BLOOD PRESSURE: 84 MMHG | TEMPERATURE: 98.5 F | HEART RATE: 76 BPM | OXYGEN SATURATION: 96 % | SYSTOLIC BLOOD PRESSURE: 138 MMHG | RESPIRATION RATE: 16 BRPM

## 2019-06-03 PROCEDURE — G0158 HHC OT ASSISTANT EA 15: HCPCS

## 2019-06-03 PROCEDURE — 3331090002 HH PPS REVENUE DEBIT

## 2019-06-03 PROCEDURE — 3331090001 HH PPS REVENUE CREDIT

## 2019-06-03 PROCEDURE — G0157 HHC PT ASSISTANT EA 15: HCPCS

## 2019-06-04 ENCOUNTER — HOME CARE VISIT (OUTPATIENT)
Dept: SCHEDULING | Facility: HOME HEALTH | Age: 84
End: 2019-06-04
Payer: MEDICARE

## 2019-06-04 VITALS
HEART RATE: 87 BPM | RESPIRATION RATE: 16 BRPM | TEMPERATURE: 98.3 F | DIASTOLIC BLOOD PRESSURE: 84 MMHG | SYSTOLIC BLOOD PRESSURE: 132 MMHG | OXYGEN SATURATION: 98 %

## 2019-06-04 VITALS
HEART RATE: 76 BPM | OXYGEN SATURATION: 96 % | TEMPERATURE: 98.5 F | DIASTOLIC BLOOD PRESSURE: 84 MMHG | SYSTOLIC BLOOD PRESSURE: 138 MMHG

## 2019-06-04 PROCEDURE — 3331090002 HH PPS REVENUE DEBIT

## 2019-06-04 PROCEDURE — 3331090001 HH PPS REVENUE CREDIT

## 2019-06-04 PROCEDURE — G0158 HHC OT ASSISTANT EA 15: HCPCS

## 2019-06-05 PROCEDURE — 3331090002 HH PPS REVENUE DEBIT

## 2019-06-05 PROCEDURE — 3331090001 HH PPS REVENUE CREDIT

## 2019-06-06 ENCOUNTER — TELEPHONE (OUTPATIENT)
Dept: ORTHOPEDIC SURGERY | Facility: CLINIC | Age: 84
End: 2019-06-06

## 2019-06-06 PROCEDURE — 3331090002 HH PPS REVENUE DEBIT

## 2019-06-06 PROCEDURE — 3331090001 HH PPS REVENUE CREDIT

## 2019-06-06 NOTE — TELEPHONE ENCOUNTER
Patients daughter Nila Emery called in states that on 05/30/19 patient stepped in hole and went to [facility] and then to rehab. [place] [facility] says while in rehab pt fell and they are concerned about the hip and left leg. Patient is having x-rays done tomorrow 06/07/19 and a test for blood clots. Eric Brody was requesting to see if patient can be worked in next week to be checked out, she was given next available of 06/20/19. Eric Brody can be reached at [phone].

## 2019-06-07 ENCOUNTER — HOSPITAL ENCOUNTER (OUTPATIENT)
Dept: VASCULAR SURGERY | Age: 84
Discharge: HOME | End: 2019-06-07
Attending: FAMILY MEDICINE
Payer: MEDICARE

## 2019-06-07 ENCOUNTER — HOSPITAL ENCOUNTER (OUTPATIENT)
Dept: GENERAL RADIOLOGY | Age: 84
Discharge: HOME | End: 2019-06-07
Payer: MEDICARE

## 2019-06-07 ENCOUNTER — HOME CARE VISIT (OUTPATIENT)
Dept: HOME HEALTH SERVICES | Facility: HOME HEALTH | Age: 84
End: 2019-06-07
Payer: MEDICARE

## 2019-06-07 DIAGNOSIS — M25.552 LEFT HIP PAIN: ICD-10-CM

## 2019-06-07 DIAGNOSIS — I10 ESSENTIAL HYPERTENSION, BENIGN: ICD-10-CM

## 2019-06-07 DIAGNOSIS — R55 FAINTING: ICD-10-CM

## 2019-06-07 DIAGNOSIS — M79.609 LIMB PAIN: ICD-10-CM

## 2019-06-07 DIAGNOSIS — R60.0 EDEMA, LEG: ICD-10-CM

## 2019-06-07 PROCEDURE — 3331090002 HH PPS REVENUE DEBIT

## 2019-06-07 PROCEDURE — 3331090001 HH PPS REVENUE CREDIT

## 2019-06-07 PROCEDURE — 73502 X-RAY EXAM HIP UNI 2-3 VIEWS: CPT

## 2019-06-07 PROCEDURE — 93971 EXTREMITY STUDY: CPT

## 2019-06-08 PROCEDURE — 3331090001 HH PPS REVENUE CREDIT

## 2019-06-08 PROCEDURE — 3331090002 HH PPS REVENUE DEBIT

## 2019-06-09 PROCEDURE — 3331090002 HH PPS REVENUE DEBIT

## 2019-06-09 PROCEDURE — 3331090001 HH PPS REVENUE CREDIT

## 2019-06-10 ENCOUNTER — HOME CARE VISIT (OUTPATIENT)
Dept: HOME HEALTH SERVICES | Facility: HOME HEALTH | Age: 84
End: 2019-06-10
Payer: MEDICARE

## 2019-06-10 PROCEDURE — 3331090001 HH PPS REVENUE CREDIT

## 2019-06-10 PROCEDURE — 3331090002 HH PPS REVENUE DEBIT

## 2019-06-11 PROCEDURE — 3331090002 HH PPS REVENUE DEBIT

## 2019-06-11 PROCEDURE — 3331090001 HH PPS REVENUE CREDIT

## 2019-06-12 ENCOUNTER — HOME CARE VISIT (OUTPATIENT)
Dept: HOME HEALTH SERVICES | Facility: HOME HEALTH | Age: 84
End: 2019-06-12
Payer: MEDICARE

## 2019-06-12 PROCEDURE — 3331090002 HH PPS REVENUE DEBIT

## 2019-06-12 PROCEDURE — 3331090001 HH PPS REVENUE CREDIT

## 2019-06-13 ENCOUNTER — HOME CARE VISIT (OUTPATIENT)
Dept: SCHEDULING | Facility: HOME HEALTH | Age: 84
End: 2019-06-13
Payer: MEDICARE

## 2019-06-13 VITALS
SYSTOLIC BLOOD PRESSURE: 110 MMHG | HEART RATE: 77 BPM | OXYGEN SATURATION: 95 % | DIASTOLIC BLOOD PRESSURE: 86 MMHG | TEMPERATURE: 97.8 F

## 2019-06-13 PROCEDURE — 3331090002 HH PPS REVENUE DEBIT

## 2019-06-13 PROCEDURE — G0151 HHCP-SERV OF PT,EA 15 MIN: HCPCS

## 2019-06-13 PROCEDURE — G0152 HHCP-SERV OF OT,EA 15 MIN: HCPCS

## 2019-06-13 PROCEDURE — 3331090001 HH PPS REVENUE CREDIT

## 2019-06-14 VITALS
OXYGEN SATURATION: 95 % | TEMPERATURE: 97.8 F | SYSTOLIC BLOOD PRESSURE: 110 MMHG | DIASTOLIC BLOOD PRESSURE: 86 MMHG | RESPIRATION RATE: 16 BRPM | HEART RATE: 77 BPM

## 2019-06-14 PROCEDURE — 3331090002 HH PPS REVENUE DEBIT

## 2019-06-14 PROCEDURE — 3331090001 HH PPS REVENUE CREDIT

## 2019-06-15 PROCEDURE — 3331090002 HH PPS REVENUE DEBIT

## 2019-06-15 PROCEDURE — 3331090001 HH PPS REVENUE CREDIT

## 2019-06-16 PROCEDURE — 3331090002 HH PPS REVENUE DEBIT

## 2019-06-16 PROCEDURE — 3331090001 HH PPS REVENUE CREDIT

## 2019-06-17 PROCEDURE — 3331090002 HH PPS REVENUE DEBIT

## 2019-06-17 PROCEDURE — 3331090001 HH PPS REVENUE CREDIT

## 2019-06-18 ENCOUNTER — HOME CARE VISIT (OUTPATIENT)
Dept: SCHEDULING | Facility: HOME HEALTH | Age: 84
End: 2019-06-18
Payer: MEDICARE

## 2019-06-18 PROCEDURE — G0152 HHCP-SERV OF OT,EA 15 MIN: HCPCS

## 2019-06-18 PROCEDURE — 3331090002 HH PPS REVENUE DEBIT

## 2019-06-18 PROCEDURE — 3331090001 HH PPS REVENUE CREDIT

## 2019-06-19 PROCEDURE — 3331090002 HH PPS REVENUE DEBIT

## 2019-06-19 PROCEDURE — 3331090001 HH PPS REVENUE CREDIT

## 2019-06-20 PROCEDURE — 3331090002 HH PPS REVENUE DEBIT

## 2019-06-20 PROCEDURE — 3331090001 HH PPS REVENUE CREDIT

## 2019-06-21 ENCOUNTER — HOME CARE VISIT (OUTPATIENT)
Dept: SCHEDULING | Facility: HOME HEALTH | Age: 84
End: 2019-06-21
Payer: MEDICARE

## 2019-06-21 PROCEDURE — 3331090001 HH PPS REVENUE CREDIT

## 2019-06-21 PROCEDURE — 3331090002 HH PPS REVENUE DEBIT

## 2019-06-21 PROCEDURE — 3331090003 HH PPS REVENUE ADJ

## 2019-06-21 PROCEDURE — G0152 HHCP-SERV OF OT,EA 15 MIN: HCPCS

## 2020-02-03 ENCOUNTER — HOME HEALTH ADMISSION (OUTPATIENT)
Dept: HOME HEALTH SERVICES | Facility: HOME HEALTH | Age: 85
End: 2020-02-03
Payer: MEDICARE

## 2020-02-04 ENCOUNTER — HOME CARE VISIT (OUTPATIENT)
Dept: SCHEDULING | Facility: HOME HEALTH | Age: 85
End: 2020-02-04
Payer: MEDICARE

## 2020-02-04 PROCEDURE — G0151 HHCP-SERV OF PT,EA 15 MIN: HCPCS

## 2020-02-04 PROCEDURE — 3331090001 HH PPS REVENUE CREDIT

## 2020-02-04 PROCEDURE — 400013 HH SOC

## 2020-02-04 PROCEDURE — 3331090002 HH PPS REVENUE DEBIT

## 2020-02-05 ENCOUNTER — HOME CARE VISIT (OUTPATIENT)
Dept: SCHEDULING | Facility: HOME HEALTH | Age: 85
End: 2020-02-05
Payer: MEDICARE

## 2020-02-05 VITALS
SYSTOLIC BLOOD PRESSURE: 126 MMHG | DIASTOLIC BLOOD PRESSURE: 62 MMHG | HEART RATE: 92 BPM | TEMPERATURE: 97.7 F | OXYGEN SATURATION: 92 %

## 2020-02-05 VITALS
HEART RATE: 65 BPM | SYSTOLIC BLOOD PRESSURE: 138 MMHG | DIASTOLIC BLOOD PRESSURE: 68 MMHG | OXYGEN SATURATION: 94 % | TEMPERATURE: 97.9 F

## 2020-02-05 PROCEDURE — G0157 HHC PT ASSISTANT EA 15: HCPCS

## 2020-02-05 PROCEDURE — 3331090002 HH PPS REVENUE DEBIT

## 2020-02-05 PROCEDURE — 3331090001 HH PPS REVENUE CREDIT

## 2020-02-06 ENCOUNTER — HOME CARE VISIT (OUTPATIENT)
Dept: HOME HEALTH SERVICES | Facility: HOME HEALTH | Age: 85
End: 2020-02-06
Payer: MEDICARE

## 2020-02-06 ENCOUNTER — HOME CARE VISIT (OUTPATIENT)
Dept: SCHEDULING | Facility: HOME HEALTH | Age: 85
End: 2020-02-06
Payer: MEDICARE

## 2020-02-06 VITALS
OXYGEN SATURATION: 93 % | SYSTOLIC BLOOD PRESSURE: 142 MMHG | HEART RATE: 95 BPM | DIASTOLIC BLOOD PRESSURE: 70 MMHG | TEMPERATURE: 97.4 F

## 2020-02-06 PROCEDURE — 3331090002 HH PPS REVENUE DEBIT

## 2020-02-06 PROCEDURE — G0157 HHC PT ASSISTANT EA 15: HCPCS

## 2020-02-06 PROCEDURE — 3331090001 HH PPS REVENUE CREDIT

## 2020-02-07 ENCOUNTER — HOSPITAL ENCOUNTER (OUTPATIENT)
Dept: GENERAL RADIOLOGY | Age: 85
Discharge: HOME | End: 2020-02-07
Payer: MEDICARE

## 2020-02-07 ENCOUNTER — HOME CARE VISIT (OUTPATIENT)
Dept: SCHEDULING | Facility: HOME HEALTH | Age: 85
End: 2020-02-07
Payer: MEDICARE

## 2020-02-07 DIAGNOSIS — M54.50 LOW BACK PAIN: ICD-10-CM

## 2020-02-07 PROCEDURE — 3331090001 HH PPS REVENUE CREDIT

## 2020-02-07 PROCEDURE — 72110 X-RAY EXAM L-2 SPINE 4/>VWS: CPT

## 2020-02-07 PROCEDURE — 3331090002 HH PPS REVENUE DEBIT

## 2020-02-07 PROCEDURE — G0156 HHCP-SVS OF AIDE,EA 15 MIN: HCPCS

## 2020-02-08 PROCEDURE — 3331090001 HH PPS REVENUE CREDIT

## 2020-02-08 PROCEDURE — 3331090002 HH PPS REVENUE DEBIT

## 2020-02-09 PROCEDURE — 3331090001 HH PPS REVENUE CREDIT

## 2020-02-09 PROCEDURE — 3331090002 HH PPS REVENUE DEBIT

## 2020-02-10 ENCOUNTER — HOME CARE VISIT (OUTPATIENT)
Dept: SCHEDULING | Facility: HOME HEALTH | Age: 85
End: 2020-02-10
Payer: MEDICARE

## 2020-02-10 VITALS
TEMPERATURE: 96.8 F | SYSTOLIC BLOOD PRESSURE: 120 MMHG | HEART RATE: 87 BPM | DIASTOLIC BLOOD PRESSURE: 64 MMHG | OXYGEN SATURATION: 93 %

## 2020-02-10 VITALS
SYSTOLIC BLOOD PRESSURE: 124 MMHG | OXYGEN SATURATION: 94 % | HEART RATE: 78 BPM | DIASTOLIC BLOOD PRESSURE: 62 MMHG | TEMPERATURE: 97.3 F

## 2020-02-10 PROCEDURE — 3331090001 HH PPS REVENUE CREDIT

## 2020-02-10 PROCEDURE — G0152 HHCP-SERV OF OT,EA 15 MIN: HCPCS

## 2020-02-10 PROCEDURE — 3331090002 HH PPS REVENUE DEBIT

## 2020-02-10 PROCEDURE — G0157 HHC PT ASSISTANT EA 15: HCPCS

## 2020-02-11 ENCOUNTER — OFFICE VISIT (OUTPATIENT)
Dept: ORTHOPEDIC SURGERY | Age: 85
End: 2020-02-11

## 2020-02-11 ENCOUNTER — HOME CARE VISIT (OUTPATIENT)
Dept: SCHEDULING | Facility: HOME HEALTH | Age: 85
End: 2020-02-11
Payer: MEDICARE

## 2020-02-11 VITALS
TEMPERATURE: 97.4 F | SYSTOLIC BLOOD PRESSURE: 123 MMHG | HEART RATE: 68 BPM | DIASTOLIC BLOOD PRESSURE: 62 MMHG | HEIGHT: 64 IN | BODY MASS INDEX: 30.05 KG/M2 | OXYGEN SATURATION: 94 % | WEIGHT: 176 LBS

## 2020-02-11 DIAGNOSIS — M81.0 OSTEOPOROSIS, UNSPECIFIED OSTEOPOROSIS TYPE, UNSPECIFIED PATHOLOGICAL FRACTURE PRESENCE: ICD-10-CM

## 2020-02-11 DIAGNOSIS — R29.898 WEAKNESS OF BOTH LEGS: ICD-10-CM

## 2020-02-11 DIAGNOSIS — M54.50 LUMBAR PAIN: ICD-10-CM

## 2020-02-11 DIAGNOSIS — M79.18 MYOFASCIAL PAIN: ICD-10-CM

## 2020-02-11 DIAGNOSIS — S32.010A CLOSED COMPRESSION FRACTURE OF BODY OF L1 VERTEBRA (HCC): Primary | ICD-10-CM

## 2020-02-11 DIAGNOSIS — R26.9 GAIT ABNORMALITY: ICD-10-CM

## 2020-02-11 DIAGNOSIS — M25.551 RIGHT HIP PAIN: ICD-10-CM

## 2020-02-11 PROCEDURE — 3331090001 HH PPS REVENUE CREDIT

## 2020-02-11 PROCEDURE — G0156 HHCP-SVS OF AIDE,EA 15 MIN: HCPCS

## 2020-02-11 PROCEDURE — 3331090002 HH PPS REVENUE DEBIT

## 2020-02-11 RX ORDER — ACETAMINOPHEN AND CODEINE PHOSPHATE 300; 30 MG/1; MG/1
1 TABLET ORAL
Qty: 14 TAB | Refills: 0 | Status: SHIPPED | OUTPATIENT
Start: 2020-02-11 | End: 2020-02-18

## 2020-02-11 RX ORDER — CALCITONIN SALMON 200 [IU]/.09ML
SPRAY, METERED NASAL
Qty: 1 BOTTLE | Refills: 0 | Status: SHIPPED | OUTPATIENT
Start: 2020-02-11 | End: 2020-02-13 | Stop reason: SDUPTHER

## 2020-02-11 NOTE — PATIENT INSTRUCTIONS
Low Back Arthritis: Exercises Introduction Here are some examples of typical rehabilitation exercises for your condition. Start each exercise slowly. Ease off the exercise if you start to have pain. Your doctor or physical therapist will tell you when you can start these exercises and which ones will work best for you. When you are not being active, find a comfortable position for rest. Some people are comfortable on the floor or a medium-firm bed with a small pillow under their head and another under their knees. Some people prefer to lie on their side with a pillow between their knees. Don't stay in one position for too long. Take short walks (10 to 20 minutes) every 2 to 3 hours. Avoid slopes, hills, and stairs until you feel better. Walk only distances you can manage without pain, especially leg pain. How to do the exercises Pelvic tilt 1. Lie on your back with your knees bent. 2. \"Brace\" your stomachtighten your muscles by pulling in and imagining your belly button moving toward your spine. 3. Press your lower back into the floor. You should feel your hips and pelvis rock back. 4. Hold for 6 seconds while breathing smoothly. 5. Relax and allow your pelvis and hips to rock forward. 6. Repeat 8 to 12 times. Back stretches 1. Get down on your hands and knees on the floor. 2. Relax your head and allow it to droop. Round your back up toward the ceiling until you feel a nice stretch in your upper, middle, and lower back. Hold this stretch for as long as it feels comfortable, or about 15 to 30 seconds. 3. Return to the starting position with a flat back while you are on your hands and knees. 4. Let your back sway by pressing your stomach toward the floor. Lift your buttocks toward the ceiling. 5. Hold this position for 15 to 30 seconds. 6. Repeat 2 to 4 times. Follow-up care is a key part of your treatment and safety.  Be sure to make and go to all appointments, and call your doctor if you are having problems. It's also a good idea to know your test results and keep a list of the medicines you take. Where can you learn more? Go to http://montana-osmany.info/. Enter W927 in the search box to learn more about \"Low Back Arthritis: Exercises. \" Current as of: June 26, 2019 Content Version: 12.2 © 9039-5353 [employer]. Care instructions adapted under license by [employer] (which disclaims liability or warranty for this information). If you have questions about a medical condition or this instruction, always ask your healthcare professional. [address] any warranty or liability for your use of this information.

## 2020-02-11 NOTE — LETTER
2/12/20 Patient: Kilo Avitia YOB: 1931 Date of Visit: 2/11/2020 Korina Reddy MD 
[address] [facility] [address] [facility] [address] VIA Facsimile: [phone] Dear Korina Reddy MD, Thank you for referring Ms. Jerri Goldberg to [address] [facility] for evaluation. My notes for this consultation are attached. If you have questions, please do not hesitate to call me. I look forward to following your patient along with you. Sincerely, Segun Mobley MD

## 2020-02-11 NOTE — PROGRESS NOTES
[facility]  [address] [place] [address], [facility] [address]  Phone: [phone]  Fax: [phone]    NEW PATIENT  Pt's YOB: 1931    ASSESSMENT   Diagnoses and all orders for this visit:    1. Closed compression fracture of body of L1 vertebra (HCC)  -     MRI LUMB SPINE WO CONT; Future  -     calcitonin, salmon, (MIACALCIN) nasal; 1 spray intranasal daily, Alternate nostrils. -     acetaminophen-codeine (TYLENOL-CODEINE #3) 300-30 mg per tablet; Take 1 Tab by mouth two (2) times daily as needed (Severe Pain) for up to 7 days. Max Daily Amount: 2 Tabs. 2. Lumbar pain  -     MRI LUMB SPINE WO CONT; Future  -     acetaminophen-codeine (TYLENOL-CODEINE #3) 300-30 mg per tablet; Take 1 Tab by mouth two (2) times daily as needed (Severe Pain) for up to 7 days. Max Daily Amount: 2 Tabs. 3. Osteoporosis, unspecified osteoporosis type, unspecified pathological fracture presence  -     calcitonin, salmon, (MIACALCIN) nasal; 1 spray intranasal daily, Alternate nostrils. 4. Right hip pain  -     XR HIP RT W OR WO PELV 2-3 VWS; Future    5. Myofascial pain    6. Weakness of both legs    7. Gait abnormality         IMPRESSION AND PLAN:  Carlos Alberto Cha is a 80 y.o. female with history of lumbar pain. Pt complains of pain in the lower back after leaning backwards for some time while she had her hair shampooed/cut in 01/2020. She also complains of pain in the legs, R>L, right groin pain, and weakness in the legs. Pt has been taking Tylenol Extra Strength and ibuprofen as needed. 1) Pt was given information on lumbar arthritis exercises. 2) A lumbar MRI was ordered to assess for a compression fracture. She has increased lumbar pain, difficulty standing/walking, and has a history of osteoporosis. 3) Right hip x-rays were ordered. 4) Pt was prescribed Tylenol #3 1 tab BID prn severe pain. 5) She was also prescribed Miacalcin nasal spray. 6) Ms. Karen Montero has a reminder for a \"due or due soon\" health maintenance. I have asked that she contact her primary care provider, Kasia Avila MD, for follow-up on this health maintenance. 7)  demonstrated consistency with prescribing. Follow-up and Dispositions    · Return in about 2 weeks (around 2/25/2020) for Medication follow up, Diagnostic Test follow up. HISTORY OF PRESENT ILLNESS:  Ira Thorne is a 80 y.o. female with history of lumbar pain. Pt presents to the office today as a new patient. Pt complains of pain in the lower back after leaning backwards for some time while she had her hair shampooed/cut in 01/2020. She also complains of pain in the legs, R>L, right groin pain, and weakness in the legs. Pt reports difficulty flexing the right hip due to the weakness. She denies any recent falls. She also denies any breathing issues or difficulty coughing/sneezing and has a history of osteoporosis. Pt has been taking Tylenol Extra Strength and ibuprofen as needed and using topical ointments with minima relief. She admits that she does not sleep well and reports that she often sleeps in a recliner. Pt is not currently on blood thinners. Pt at this time desires to proceed with a lumbar MRI and medication evaluation. 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 Pain Scale: 9/10     PCP: Kasia Avila MD    Past Medical History:   Diagnosis Date    Essential hypertension     Hyperlipidemia     Other premature beats 5/13/2013     3.  11,437 single ve's, 16 paired. Bigeminy and trigeminy noted. Approximately 15% o pvc's on ekg. asymptomatic potassium normal check echo for lv function         Social History     Socioeconomic History    Marital status:      Spouse name: Not on file    Number of children: Not on file    Years of education: Not on file    Highest education level: Not on file   Occupational History    Not on file   Social Needs    Financial resource strain: Not on file    Food insecurity:     Worry: Not on file     Inability: Not on file    Transportation needs:     Medical: Not on file     Non-medical: Not on file   Tobacco Use    Smoking status: Never Smoker   Substance and Sexual Activity    Alcohol use: No    Drug use: No    Sexual activity: Never   Lifestyle    Physical activity:     Days per week: Not on file     Minutes per session: Not on file    Stress: Not on file   Relationships    Social connections:     Talks on phone: Not on file     Gets together: Not on file     Attends Nondenominational service: Not on file     Active member of club or organization: Not on file     Attends meetings of clubs or organizations: Not on file     Relationship status: Not on file    Intimate partner violence:     Fear of current or ex partner: Not on file     Emotionally abused: Not on file     Physically abused: Not on file     Forced sexual activity: Not on file   Other Topics Concern    Not on file   Social History Narrative    Not on file       Current Outpatient Medications   Medication Sig Dispense Refill    calcitonin, salmon, (MIACALCIN) nasal 1 spray intranasal daily, Alternate nostrils. 1 Bottle 0    acetaminophen-codeine (TYLENOL-CODEINE #3) 300-30 mg per tablet Take 1 Tab by mouth two (2) times daily as needed (Severe Pain) for up to 7 days. Max Daily Amount: 2 Tabs. 14 Tab 0    ibuprofen (MOTRIN) 400 mg tablet Take 400 mg by mouth three (3) times daily.  acetaminophen (TYLENOL) 325 mg tablet Take 2 Tabs by mouth every four (4) hours as needed for Pain. (Patient taking differently: Take 500 mg by mouth three (3) times daily.) 60 Tab 0    diclofenac (VOLTAREN) 1 % gel Apply 4 g to affected area four (4) times daily as needed for Pain. Indications: Osteoarthritis of the Knee 100 g 0    aspirin delayed-release 81 mg tablet Take 81 mg by mouth daily.  raloxifene (EVISTA) 60 mg tablet Take 60 mg by mouth daily.       losartan-hydrochlorothiazide (HYZAAR) 50-12.5 mg per tablet Take 1 Tab by mouth daily.  pantoprazole (PROTONIX) 20 mg tablet Take 20 mg by mouth daily. Indications: gastroesophageal reflux disease      allopurinol (ZYLOPRIM) 100 mg tablet Take 100 mg by mouth daily.  colchicine 0.6 mg tablet Take 0.6 mg by mouth daily.  nitrofurantoin (MACRODANTIN) 50 mg capsule Take 1 Cap by mouth every six (6) hours. (Patient not taking: Reported on 2/11/2020) 6 Cap 0    atorvastatin (LIPITOR) 20 mg tablet Take 20 mg by mouth daily. No Known Allergies    REVIEW OF SYSTEMS    Constitutional: Negative for fever, chills, or weight change. Respiratory: Negative for cough or shortness of breath. Cardiovascular: Negative for chest pain or palpitations. Gastrointestinal: Negative for acid reflux, change in bowel habits, or constipation. Genitourinary: Negative for dysuria and flank pain. Musculoskeletal: Positive for lumbar pain. Skin: Negative for rash. Neurological: Negative for headaches, dizziness, or numbness. Endo/Heme/Allergies: Negative for increased bruising. Psychiatric/Behavioral: Positive for difficulty with sleep. PHYSICAL EXAMINATION  Visit Vitals  /62 (BP 1 Location: Right arm, BP Patient Position: Sitting)   Pulse 68   Temp 97.4 °F (36.3 °C) (Oral)   Ht 5' 4\" (1.626 m)   Wt 176 lb (79.8 kg)   SpO2 94%   BMI 30.21 kg/m²       Constitutional: Awake, alert, and in no acute distress. HEENT: Normocephalic. Atraumatic. Oropharynx is moist and clear. PERRL. EOMI. Sclerae are nonicteric  Cardiovascular: Regular rate and rhythm  Lungs: Clear to auscultation bilaterally  Abdomen: Soft and nontender. Bowel sounds are present  Neurological: 1+ symmetrical DTRs in the upper extremities. 1+ symmetrical DTRs in the lower extremities. Sensation to light touch is intact. Negative Fontaine's sign bilaterally. Skin: warm, dry, and intact.    Musculoskeletal: Limited range of motion with abduction of the right shoulder. Good range of motion in the left shoulder. Decreased range of motion with side to side cervical flexion. Good range of motion with cervical extension and forward flexion. Tenderness to palpation in the lower lumbar region. Moderate pain with extension and axial loading. Improvement with forward flexion. Difficulty transitioning from sit to stand. Pain and decreased range of motion with internal rotation of her right hip; good range of motion and no pain on the left. Negative straight leg raise bilaterally. Patient ambulates with the assistance of a rolling walker. Biceps  Triceps Deltoids Wrist Ext Wrist Flex Hand Intrin   Right +4/5 +4/5 +4/5 +4/5 +4/5 +4/5   Left +4/5 +4/5 +4/5 +4/5 +4/5 +4/5      Hip Flex  Quads Hamstrings Ankle DF EHL Ankle PF   Right +4/5 +4/5 +4/5 +4/5 +4/5 +4/5   Left +4/5 +4/5 +4/5 +4/5 +4/5 +4/5     IMAGING:    Lumbar spine x-rays from 02/07/2020 were personally reviewed with the patient and demonstrated:  Results from Hospital Encounter encounter on 02/07/20   XR SPINE LUMB MIN 4 V    Narrative EXAM: LUMBAR SPINE SERIES    CLINICAL HISTORY/INDICATION: Acute worsening of chronic low back pain x1 year     COMPARISON: Lumbar spine April 2, 2019. TECHNIQUE: Five views obtained. FINDINGS:    Marked left curvature of the lumbar spine is new. New severe compression  fracture of L1 about 4 mm of posterior displacement of the posterior cortex of  the vertebra into the spinal canal. About 3 mm of anterior offset of L3 on L4. Marked disc space narrowing at L3/4, L2/3, and L4/5. Moderate to severe disc  space narrowing at L5/S1. Impression IMPRESSION:    New moderate compression fracture of L1 with slight retropulsion of the  vertebral body into the spinal canal by 4 mm.   Consideration could be given for vertebral plasty if consideration is being  given to pain control  Multilevel marked degenerative disc disease of the mid and lower lumbar spine.  Generalized demineralization. Marked curvature     Bone density study from 04/02/2018 was personally reviewed with the patient and demonstrated:  IMPRESSION:  1. BMD measures consistent with osteoporosis. 2.  Compared to the preceding study, BMD has decreased. Written by Geoffrey Borden, as dictated by Benson Barnett MD.  I, Dr. Benson Barnett confirm that all documentation is accurate.

## 2020-02-12 ENCOUNTER — HOME CARE VISIT (OUTPATIENT)
Dept: SCHEDULING | Facility: HOME HEALTH | Age: 85
End: 2020-02-12
Payer: MEDICARE

## 2020-02-12 VITALS
DIASTOLIC BLOOD PRESSURE: 82 MMHG | SYSTOLIC BLOOD PRESSURE: 132 MMHG | OXYGEN SATURATION: 98 % | HEART RATE: 88 BPM | TEMPERATURE: 98.7 F | RESPIRATION RATE: 20 BRPM

## 2020-02-12 VITALS — OXYGEN SATURATION: 94 % | DIASTOLIC BLOOD PRESSURE: 62 MMHG | SYSTOLIC BLOOD PRESSURE: 124 MMHG | HEART RATE: 81 BPM

## 2020-02-12 PROCEDURE — G0157 HHC PT ASSISTANT EA 15: HCPCS

## 2020-02-12 PROCEDURE — 3331090001 HH PPS REVENUE CREDIT

## 2020-02-12 PROCEDURE — 3331090002 HH PPS REVENUE DEBIT

## 2020-02-12 PROCEDURE — G0158 HHC OT ASSISTANT EA 15: HCPCS

## 2020-02-13 ENCOUNTER — HOME CARE VISIT (OUTPATIENT)
Dept: SCHEDULING | Facility: HOME HEALTH | Age: 85
End: 2020-02-13
Payer: MEDICARE

## 2020-02-13 VITALS
OXYGEN SATURATION: 93 % | DIASTOLIC BLOOD PRESSURE: 62 MMHG | HEART RATE: 91 BPM | SYSTOLIC BLOOD PRESSURE: 114 MMHG | TEMPERATURE: 97.5 F

## 2020-02-13 DIAGNOSIS — S32.010A CLOSED COMPRESSION FRACTURE OF BODY OF L1 VERTEBRA (HCC): ICD-10-CM

## 2020-02-13 DIAGNOSIS — M81.0 OSTEOPOROSIS, UNSPECIFIED OSTEOPOROSIS TYPE, UNSPECIFIED PATHOLOGICAL FRACTURE PRESENCE: ICD-10-CM

## 2020-02-13 PROCEDURE — 3331090001 HH PPS REVENUE CREDIT

## 2020-02-13 PROCEDURE — 3331090002 HH PPS REVENUE DEBIT

## 2020-02-13 PROCEDURE — G0157 HHC PT ASSISTANT EA 15: HCPCS

## 2020-02-13 RX ORDER — CALCITONIN SALMON 200 [IU]/.09ML
SPRAY, METERED NASAL
Qty: 1 BOTTLE | Refills: 0 | Status: SHIPPED | OUTPATIENT
Start: 2020-02-13

## 2020-02-13 NOTE — TELEPHONE ENCOUNTER
Patient daughter Pretty Keith ( on hipaa) said that the patient was prescribed Miacalcin Nasal medication by . That [facility] was unable to fill the medication. Patient daughter is requesting if the Miacalcin medication be sent to [facility] on [address] [address] tel [phone]. Pretty Keith tel. [phone].

## 2020-02-13 NOTE — TELEPHONE ENCOUNTER
Returned call to patient's daughter Peggi [employer] [place]\" as per HIPAA, Reached unidentified voicemail, left message, identified myself/facility/callback number, requested return call to facility.

## 2020-02-14 ENCOUNTER — HOME CARE VISIT (OUTPATIENT)
Dept: SCHEDULING | Facility: HOME HEALTH | Age: 85
End: 2020-02-14
Payer: MEDICARE

## 2020-02-14 PROCEDURE — 3331090001 HH PPS REVENUE CREDIT

## 2020-02-14 PROCEDURE — G0156 HHCP-SVS OF AIDE,EA 15 MIN: HCPCS

## 2020-02-14 PROCEDURE — G0158 HHC OT ASSISTANT EA 15: HCPCS

## 2020-02-14 PROCEDURE — 3331090002 HH PPS REVENUE DEBIT

## 2020-02-15 PROCEDURE — 3331090002 HH PPS REVENUE DEBIT

## 2020-02-15 PROCEDURE — 3331090001 HH PPS REVENUE CREDIT

## 2020-02-16 VITALS
TEMPERATURE: 98.7 F | SYSTOLIC BLOOD PRESSURE: 138 MMHG | HEART RATE: 88 BPM | RESPIRATION RATE: 18 BRPM | OXYGEN SATURATION: 98 % | DIASTOLIC BLOOD PRESSURE: 78 MMHG

## 2020-02-16 PROCEDURE — 3331090002 HH PPS REVENUE DEBIT

## 2020-02-16 PROCEDURE — 3331090001 HH PPS REVENUE CREDIT

## 2020-02-17 PROCEDURE — 3331090002 HH PPS REVENUE DEBIT

## 2020-02-17 PROCEDURE — 3331090001 HH PPS REVENUE CREDIT

## 2020-02-18 ENCOUNTER — HOME CARE VISIT (OUTPATIENT)
Dept: SCHEDULING | Facility: HOME HEALTH | Age: 85
End: 2020-02-18
Payer: MEDICARE

## 2020-02-18 ENCOUNTER — HOME CARE VISIT (OUTPATIENT)
Dept: HOME HEALTH SERVICES | Facility: HOME HEALTH | Age: 85
End: 2020-02-18
Payer: MEDICARE

## 2020-02-18 VITALS
HEART RATE: 84 BPM | DIASTOLIC BLOOD PRESSURE: 76 MMHG | TEMPERATURE: 98.7 F | OXYGEN SATURATION: 98 % | RESPIRATION RATE: 24 BRPM | SYSTOLIC BLOOD PRESSURE: 124 MMHG

## 2020-02-18 PROCEDURE — G0158 HHC OT ASSISTANT EA 15: HCPCS

## 2020-02-18 PROCEDURE — 3331090002 HH PPS REVENUE DEBIT

## 2020-02-18 PROCEDURE — G0151 HHCP-SERV OF PT,EA 15 MIN: HCPCS

## 2020-02-18 PROCEDURE — 3331090001 HH PPS REVENUE CREDIT

## 2020-02-18 PROCEDURE — G0156 HHCP-SVS OF AIDE,EA 15 MIN: HCPCS

## 2020-02-19 ENCOUNTER — HOME CARE VISIT (OUTPATIENT)
Dept: SCHEDULING | Facility: HOME HEALTH | Age: 85
End: 2020-02-19
Payer: MEDICARE

## 2020-02-19 VITALS
OXYGEN SATURATION: 96 % | TEMPERATURE: 98 F | DIASTOLIC BLOOD PRESSURE: 68 MMHG | SYSTOLIC BLOOD PRESSURE: 132 MMHG | HEART RATE: 70 BPM

## 2020-02-19 VITALS
TEMPERATURE: 97.5 F | OXYGEN SATURATION: 95 % | DIASTOLIC BLOOD PRESSURE: 66 MMHG | HEART RATE: 78 BPM | SYSTOLIC BLOOD PRESSURE: 128 MMHG

## 2020-02-19 PROCEDURE — 3331090001 HH PPS REVENUE CREDIT

## 2020-02-19 PROCEDURE — G0157 HHC PT ASSISTANT EA 15: HCPCS

## 2020-02-19 PROCEDURE — 3331090002 HH PPS REVENUE DEBIT

## 2020-02-19 PROCEDURE — G0158 HHC OT ASSISTANT EA 15: HCPCS

## 2020-02-20 ENCOUNTER — HOME CARE VISIT (OUTPATIENT)
Dept: SCHEDULING | Facility: HOME HEALTH | Age: 85
End: 2020-02-20
Payer: MEDICARE

## 2020-02-20 VITALS
TEMPERATURE: 98.7 F | DIASTOLIC BLOOD PRESSURE: 82 MMHG | OXYGEN SATURATION: 98 % | HEART RATE: 88 BPM | SYSTOLIC BLOOD PRESSURE: 132 MMHG

## 2020-02-20 PROCEDURE — 3331090001 HH PPS REVENUE CREDIT

## 2020-02-20 PROCEDURE — 3331090002 HH PPS REVENUE DEBIT

## 2020-02-20 PROCEDURE — G0299 HHS/HOSPICE OF RN EA 15 MIN: HCPCS

## 2020-02-21 ENCOUNTER — HOME CARE VISIT (OUTPATIENT)
Dept: HOME HEALTH SERVICES | Facility: HOME HEALTH | Age: 85
End: 2020-02-21
Payer: MEDICARE

## 2020-02-21 PROCEDURE — 3331090001 HH PPS REVENUE CREDIT

## 2020-02-21 PROCEDURE — 3331090002 HH PPS REVENUE DEBIT

## 2020-02-22 PROCEDURE — 3331090002 HH PPS REVENUE DEBIT

## 2020-02-22 PROCEDURE — 3331090001 HH PPS REVENUE CREDIT

## 2020-02-23 PROCEDURE — 3331090001 HH PPS REVENUE CREDIT

## 2020-02-23 PROCEDURE — 3331090002 HH PPS REVENUE DEBIT

## 2020-02-24 ENCOUNTER — HOME CARE VISIT (OUTPATIENT)
Dept: HOME HEALTH SERVICES | Facility: HOME HEALTH | Age: 85
End: 2020-02-24
Payer: MEDICARE

## 2020-02-24 VITALS
TEMPERATURE: 98 F | OXYGEN SATURATION: 98 % | HEART RATE: 77 BPM | DIASTOLIC BLOOD PRESSURE: 64 MMHG | SYSTOLIC BLOOD PRESSURE: 120 MMHG

## 2020-02-24 VITALS
OXYGEN SATURATION: 94 % | TEMPERATURE: 98.7 F | DIASTOLIC BLOOD PRESSURE: 62 MMHG | HEART RATE: 88 BPM | RESPIRATION RATE: 20 BRPM | SYSTOLIC BLOOD PRESSURE: 124 MMHG

## 2020-02-24 PROCEDURE — 3331090001 HH PPS REVENUE CREDIT

## 2020-02-24 PROCEDURE — G0157 HHC PT ASSISTANT EA 15: HCPCS

## 2020-02-24 PROCEDURE — 3331090002 HH PPS REVENUE DEBIT

## 2020-02-25 ENCOUNTER — HOME CARE VISIT (OUTPATIENT)
Dept: SCHEDULING | Facility: HOME HEALTH | Age: 85
End: 2020-02-25
Payer: MEDICARE

## 2020-02-25 PROCEDURE — G0156 HHCP-SVS OF AIDE,EA 15 MIN: HCPCS

## 2020-02-25 PROCEDURE — 3331090001 HH PPS REVENUE CREDIT

## 2020-02-25 PROCEDURE — 3331090002 HH PPS REVENUE DEBIT

## 2020-02-26 ENCOUNTER — HOME CARE VISIT (OUTPATIENT)
Dept: SCHEDULING | Facility: HOME HEALTH | Age: 85
End: 2020-02-26
Payer: MEDICARE

## 2020-02-26 VITALS
HEART RATE: 77 BPM | SYSTOLIC BLOOD PRESSURE: 126 MMHG | OXYGEN SATURATION: 94 % | TEMPERATURE: 97.3 F | DIASTOLIC BLOOD PRESSURE: 74 MMHG

## 2020-02-26 PROCEDURE — G0152 HHCP-SERV OF OT,EA 15 MIN: HCPCS

## 2020-02-26 PROCEDURE — 3331090002 HH PPS REVENUE DEBIT

## 2020-02-26 PROCEDURE — 3331090001 HH PPS REVENUE CREDIT

## 2020-02-27 ENCOUNTER — HOME CARE VISIT (OUTPATIENT)
Dept: SCHEDULING | Facility: HOME HEALTH | Age: 85
End: 2020-02-27
Payer: MEDICARE

## 2020-02-27 VITALS
TEMPERATURE: 97.2 F | HEART RATE: 71 BPM | SYSTOLIC BLOOD PRESSURE: 136 MMHG | DIASTOLIC BLOOD PRESSURE: 72 MMHG | OXYGEN SATURATION: 93 %

## 2020-02-27 PROCEDURE — G0299 HHS/HOSPICE OF RN EA 15 MIN: HCPCS

## 2020-02-27 PROCEDURE — G0151 HHCP-SERV OF PT,EA 15 MIN: HCPCS

## 2020-02-27 PROCEDURE — 3331090001 HH PPS REVENUE CREDIT

## 2020-02-27 PROCEDURE — 3331090002 HH PPS REVENUE DEBIT

## 2020-02-28 ENCOUNTER — HOSPITAL ENCOUNTER (OUTPATIENT)
Age: 85
Discharge: HOME | End: 2020-02-28
Attending: PHYSICAL MEDICINE & REHABILITATION
Payer: MEDICARE

## 2020-02-28 ENCOUNTER — HOSPITAL ENCOUNTER (OUTPATIENT)
Dept: GENERAL RADIOLOGY | Age: 85
Discharge: HOME | End: 2020-02-28
Attending: PHYSICAL MEDICINE & REHABILITATION
Payer: MEDICARE

## 2020-02-28 ENCOUNTER — HOME CARE VISIT (OUTPATIENT)
Dept: SCHEDULING | Facility: HOME HEALTH | Age: 85
End: 2020-02-28
Payer: MEDICARE

## 2020-02-28 DIAGNOSIS — M54.50 LUMBAR PAIN: ICD-10-CM

## 2020-02-28 DIAGNOSIS — M25.551 RIGHT HIP PAIN: ICD-10-CM

## 2020-02-28 DIAGNOSIS — S32.010A CLOSED COMPRESSION FRACTURE OF BODY OF L1 VERTEBRA (HCC): ICD-10-CM

## 2020-02-28 PROCEDURE — 3331090002 HH PPS REVENUE DEBIT

## 2020-02-28 PROCEDURE — 3331090001 HH PPS REVENUE CREDIT

## 2020-02-28 PROCEDURE — G0156 HHCP-SVS OF AIDE,EA 15 MIN: HCPCS

## 2020-02-28 PROCEDURE — 73502 X-RAY EXAM HIP UNI 2-3 VIEWS: CPT

## 2020-02-28 PROCEDURE — 72148 MRI LUMBAR SPINE W/O DYE: CPT

## 2020-02-29 PROCEDURE — 3331090001 HH PPS REVENUE CREDIT

## 2020-02-29 PROCEDURE — 3331090002 HH PPS REVENUE DEBIT

## 2020-03-01 VITALS
TEMPERATURE: 98 F | RESPIRATION RATE: 16 BRPM | HEART RATE: 72 BPM | DIASTOLIC BLOOD PRESSURE: 80 MMHG | OXYGEN SATURATION: 94 % | SYSTOLIC BLOOD PRESSURE: 128 MMHG

## 2020-03-01 PROCEDURE — 3331090001 HH PPS REVENUE CREDIT

## 2020-03-01 PROCEDURE — 3331090002 HH PPS REVENUE DEBIT

## 2020-03-02 PROCEDURE — 3331090001 HH PPS REVENUE CREDIT

## 2020-03-02 PROCEDURE — 3331090002 HH PPS REVENUE DEBIT

## 2020-03-03 PROCEDURE — 3331090002 HH PPS REVENUE DEBIT

## 2020-03-03 PROCEDURE — 3331090001 HH PPS REVENUE CREDIT

## 2020-03-04 PROCEDURE — 3331090002 HH PPS REVENUE DEBIT

## 2020-03-04 PROCEDURE — 3331090003 HH PPS REVENUE ADJ

## 2020-03-04 PROCEDURE — 3331090001 HH PPS REVENUE CREDIT

## 2020-03-05 PROCEDURE — 3331090001 HH PPS REVENUE CREDIT

## 2020-03-05 PROCEDURE — 3331090002 HH PPS REVENUE DEBIT

## 2020-03-06 ENCOUNTER — HOME CARE VISIT (OUTPATIENT)
Dept: SCHEDULING | Facility: HOME HEALTH | Age: 85
End: 2020-03-06
Payer: MEDICARE

## 2020-03-06 PROCEDURE — 400013 HH SOC

## 2020-03-06 PROCEDURE — G0300 HHS/HOSPICE OF LPN EA 15 MIN: HCPCS

## 2020-03-06 PROCEDURE — 3331090002 HH PPS REVENUE DEBIT

## 2020-03-06 PROCEDURE — 3331090001 HH PPS REVENUE CREDIT

## 2020-03-07 PROCEDURE — 3331090001 HH PPS REVENUE CREDIT

## 2020-03-07 PROCEDURE — 3331090002 HH PPS REVENUE DEBIT

## 2020-03-08 PROCEDURE — 3331090002 HH PPS REVENUE DEBIT

## 2020-03-08 PROCEDURE — 3331090001 HH PPS REVENUE CREDIT

## 2020-03-09 PROCEDURE — 3331090002 HH PPS REVENUE DEBIT

## 2020-03-09 PROCEDURE — 3331090001 HH PPS REVENUE CREDIT

## 2020-03-09 NOTE — PROGRESS NOTES
Returned call to patient, verified Name/, per patient she cannot come sooner than appt scheduled on 2020 as her daughter is out of town this week, patient depends on daughter for transportation, also would like daughter at appt to hear what is being said.

## 2020-03-10 VITALS
HEART RATE: 65 BPM | DIASTOLIC BLOOD PRESSURE: 76 MMHG | OXYGEN SATURATION: 98 % | RESPIRATION RATE: 16 BRPM | SYSTOLIC BLOOD PRESSURE: 122 MMHG | TEMPERATURE: 97.7 F

## 2020-03-10 PROCEDURE — 3331090002 HH PPS REVENUE DEBIT

## 2020-03-10 PROCEDURE — 3331090001 HH PPS REVENUE CREDIT

## 2020-03-11 PROCEDURE — 3331090002 HH PPS REVENUE DEBIT

## 2020-03-11 PROCEDURE — 3331090001 HH PPS REVENUE CREDIT

## 2020-03-12 PROCEDURE — 3331090001 HH PPS REVENUE CREDIT

## 2020-03-12 PROCEDURE — 3331090002 HH PPS REVENUE DEBIT

## 2020-03-13 ENCOUNTER — HOME CARE VISIT (OUTPATIENT)
Dept: SCHEDULING | Facility: HOME HEALTH | Age: 85
End: 2020-03-13
Payer: MEDICARE

## 2020-03-13 PROCEDURE — G0299 HHS/HOSPICE OF RN EA 15 MIN: HCPCS

## 2020-03-13 PROCEDURE — 3331090002 HH PPS REVENUE DEBIT

## 2020-03-13 PROCEDURE — 3331090001 HH PPS REVENUE CREDIT

## 2020-03-14 PROCEDURE — 3331090002 HH PPS REVENUE DEBIT

## 2020-03-14 PROCEDURE — 3331090001 HH PPS REVENUE CREDIT

## 2020-03-15 PROCEDURE — 3331090001 HH PPS REVENUE CREDIT

## 2020-03-15 PROCEDURE — 3331090002 HH PPS REVENUE DEBIT

## 2020-03-16 VITALS
RESPIRATION RATE: 20 BRPM | OXYGEN SATURATION: 96 % | SYSTOLIC BLOOD PRESSURE: 128 MMHG | TEMPERATURE: 96 F | HEART RATE: 80 BPM | DIASTOLIC BLOOD PRESSURE: 76 MMHG

## 2020-03-17 ENCOUNTER — OFFICE VISIT (OUTPATIENT)
Dept: ORTHOPEDIC SURGERY | Age: 85
End: 2020-03-17

## 2020-03-17 ENCOUNTER — TELEPHONE (OUTPATIENT)
Dept: ORTHOPEDIC SURGERY | Age: 85
End: 2020-03-17

## 2020-03-17 VITALS
TEMPERATURE: 97.6 F | SYSTOLIC BLOOD PRESSURE: 116 MMHG | HEART RATE: 74 BPM | OXYGEN SATURATION: 95 % | WEIGHT: 180.4 LBS | BODY MASS INDEX: 30.8 KG/M2 | HEIGHT: 64 IN | DIASTOLIC BLOOD PRESSURE: 75 MMHG

## 2020-03-17 DIAGNOSIS — R26.9 GAIT ABNORMALITY: ICD-10-CM

## 2020-03-17 DIAGNOSIS — M81.0 OSTEOPOROSIS, UNSPECIFIED OSTEOPOROSIS TYPE, UNSPECIFIED PATHOLOGICAL FRACTURE PRESENCE: ICD-10-CM

## 2020-03-17 DIAGNOSIS — R29.898 WEAKNESS OF BOTH LEGS: ICD-10-CM

## 2020-03-17 DIAGNOSIS — S32.010A CLOSED COMPRESSION FRACTURE OF BODY OF L1 VERTEBRA (HCC): ICD-10-CM

## 2020-03-17 DIAGNOSIS — M16.11 PRIMARY OSTEOARTHRITIS OF RIGHT HIP: ICD-10-CM

## 2020-03-17 DIAGNOSIS — M48.061 SPINAL STENOSIS OF LUMBAR REGION WITHOUT NEUROGENIC CLAUDICATION: Primary | ICD-10-CM

## 2020-03-17 RX ORDER — LOSARTAN POTASSIUM 50 MG/1
25 TABLET ORAL DAILY
COMMUNITY
Start: 2020-02-24

## 2020-03-17 NOTE — PROGRESS NOTES
[facility]  [address], [address]  Phone: [phone]  Fax: [phone]    Pt's YOB: 1931    ASSESSMENT   Diagnoses and all orders for this visit:    1. Spinal stenosis of lumbar region without neurogenic claudication    2. Closed compression fracture of body of L1 vertebra (HCC)    3. Osteoporosis, unspecified osteoporosis type, unspecified pathological fracture presence    4. Primary osteoarthritis of right hip    5. Weakness of both legs    6. Gait abnormality         IMPRESSION AND PLAN:  Sonya Erazo is a 80 y.o. female with history of lumbar pain. She complains of pain in the lower back that is worse with activity. Pt admits to difficulty with overhead motion on the right and notes difficulty brushing her hair. She admits to dizziness when taking Tylenol #3 and has cancelled in home physical therapy sessions due to the coronavirus. 1) Pt was given information on compression fractures and lumbar spinal stenosis. 2) Discussed treatment options with the patient including a kyphoplasty. 3) I encouraged the patient to change positions regularly. 4) She may take Tylenol Extra Strength as needed. 5) Ms. Mari Vieira has a reminder for a \"due or due soon\" health maintenance. I have asked that she contact her primary care provider, Kalpana Cam MD, for follow-up on this health maintenance. 6)  demonstrated consistency with prescribing. 7) Discussed PT for strengthening and she declines   8) If her pain worsens or persists, she may follow up for evaluation and kyphoplasty referral  Follow-up and Dispositions    · Return if symptoms worsen or fail to improve. HISTORY OF PRESENT ILLNESS:  Sonya Erazo is a 80 y.o. female with history of lumbar pain and presents to the office today for MRI follow up with her daughter. She complains of pain in the lower back that is worse with activity. Pt also complains of pain in the legs, R>L, right groin pain, and weakness in the legs. She ambulates with the assistance of a rolling walker. Pt admits to difficulty with overhead motion on the right and notes difficulty brushing her hair. Her daughter notes that she has discontinued in home piano sessions due to the recent coronavirus. Pt notes that she tries to remain active taking care of her cats. She admits to dizziness when taking Tylenol #3. Pt notes that she generally gets out of bed 3 x a night to urinate. She uses Tylenol for pain as needed. She generally keeps her rolling walker by her bed and she stands for a short time before she starts to walk towards her bathroom. Pt at this time desires to continue with current care. Pain Scale: 7/10    PCP: Abhay Leigh MD     Past Medical History:   Diagnosis Date    Essential hypertension     Hyperlipidemia     Other premature beats 5/13/2013     3.  11,437 single ve's, 16 paired. Bigeminy and trigeminy noted. Approximately 15% o pvc's on ekg. asymptomatic potassium normal check echo for lv function         Social History     Socioeconomic History    Marital status:      Spouse name: Not on file    Number of children: Not on file    Years of education: Not on file    Highest education level: Not on file   Occupational History    Not on file   Social Needs    Financial resource strain: Not on file    Food insecurity     Worry: Not on file     Inability: Not on file    Transportation needs     Medical: Not on file     Non-medical: Not on file   Tobacco Use    Smoking status: Never Smoker   Substance and Sexual Activity    Alcohol use: No    Drug use: No    Sexual activity: Never   Lifestyle    Physical activity     Days per week: Not on file     Minutes per session: Not on file    Stress: Not on file   Relationships    Social connections     Talks on phone: Not on file     Gets together: Not on file     Attends Rastafari service: Not on file     Active member of club or organization: Not on file     Attends meetings of clubs or organizations: Not on file     Relationship status: Not on file    Intimate partner violence     Fear of current or ex partner: Not on file     Emotionally abused: Not on file     Physically abused: Not on file     Forced sexual activity: Not on file   Other Topics Concern    Not on file   Social History Narrative    Not on file       Current Outpatient Medications   Medication Sig Dispense Refill    losartan (COZAAR) 50 mg tablet       methocarbamol (ROBAXIN) 500 mg tablet Take 500 mg by mouth daily. take at bedtime  Pt reports only taking it one time due to med causing dizziness; pt to discuss it w/ MD.  Indications: muscle spasm      calcitonin, salmon, (MIACALCIN) nasal 1 spray intranasal daily, Alternate nostrils. 1 Bottle 0    ibuprofen (MOTRIN) 400 mg tablet Take 400 mg by mouth three (3) times daily.  colchicine 0.6 mg tablet Take 0.6 mg by mouth daily.  acetaminophen (TYLENOL) 325 mg tablet Take 2 Tabs by mouth every four (4) hours as needed for Pain. (Patient taking differently: Take 500 mg by mouth three (3) times daily.) 60 Tab 0    diclofenac (VOLTAREN) 1 % gel Apply 4 g to affected area four (4) times daily as needed for Pain. Indications: Osteoarthritis of the Knee 100 g 0    atorvastatin (LIPITOR) 20 mg tablet Take 20 mg by mouth daily.  aspirin delayed-release 81 mg tablet Take 81 mg by mouth daily.  raloxifene (EVISTA) 60 mg tablet Take 60 mg by mouth daily.  losartan-hydrochlorothiazide (HYZAAR) 50-12.5 mg per tablet Take 1 Tab by mouth daily.  pantoprazole (PROTONIX) 20 mg tablet Take 20 mg by mouth daily. Indications: gastroesophageal reflux disease      allopurinol (ZYLOPRIM) 100 mg tablet Take 100 mg by mouth daily.  nitrofurantoin (MACRODANTIN) 50 mg capsule Take 1 Cap by mouth every six (6) hours.  (Patient not taking: Reported on 2/11/2020) 6 Cap 0 No Known Allergies      REVIEW OF SYSTEMS    Constitutional: Negative for fever, chills, or weight change. Respiratory: Negative for cough or shortness of breath. Cardiovascular: Negative for chest pain or palpitations. Gastrointestinal: Negative for acid reflux, change in bowel habits, or constipation. Genitourinary: Negative for dysuria and flank pain. Musculoskeletal: Positive for lumbar pain. Skin: Negative for rash. Neurological: Negative for headaches, dizziness, or numbness. Endo/Heme/Allergies: Negative for increased bruising. Psychiatric/Behavioral: Negative for difficulty with sleep. PHYSICAL EXAMINATION  Visit Vitals  /75 (BP 1 Location: Right arm, BP Patient Position: Sitting)   Pulse 74   Temp 97.6 °F (36.4 °C) (Oral)   Ht 5' 4\" (1.626 m)   Wt 180 lb 6.4 oz (81.8 kg)   SpO2 95%   BMI 30.97 kg/m²       Constitutional: Awake, alert, and in no acute distress. Neurological: 1+ symmetrical DTRs in the lower extremities. Sensation to light touch is intact. Skin: warm, dry, and intact. Musculoskeletal: Tenderness to palpation in the lower lumbar region. Moderate pain with extension and axial loading. Difficulty transitioning from sit to stand. No pain with internal or external rotation of her hips. Negative straight leg raise bilaterally. Patient ambulates with the assistance of a rolling walker. Hip Flex  Quads Hamstrings Ankle DF EHL Ankle PF   Right +4/5 +4/5 +4/5 +4/5 +4/5 +4/5   Left +4/5 +4/5 +4/5 +4/5 +4/5 +4/5     IMAGING:    Lumbar spine MRI from 02/28/2020 was personally reviewed with the patient and demonstrated:  Results from [place] encounter on 02/28/20   MRI LUMB SPINE WO CONT    Narrative EXAM: MRI LUMB SPINE WO CONT    CLINICAL INDICATION/HISTORY: 80 years Female. R/O new compression fracture. She  has increased lumbar pain and difficulty standing/walking. .  Additional History: None    COMPARISON: None    TECHNIQUE: Multiplanar multi-sequential imaging of the lumbar spine without  contrast.     FINDINGS:    There are 5 lumbar type vertebral bodies. The disc space at series 7 image 7  will be referred to as L5/S1 for the purposes of this dictation. There is levoscoliosis of the lumbar spine centered at L3/L4. Trace  retrolisthesis of L1 on L2 and L2 on L3. Trace anterolisthesis of L3 on L4. There is a subacute burst type fracture of the L1 vertebral body with greater  than 80% height loss centrally. There is a axillary oriented fracture line  extending to the posterior vertebral body wall. There is replacement of the  majority of the normal marrow fat within the vertebral body however there is  preservation of marrow fat posteriorly. There is paraspinal edema about the  vertebral body without definite evidence of paraspinal or epidural mass. There is no evidence of diffuse marrow infiltrative process. There is multilevel  disc desiccation. Multiple Schmorl's nodes are present. Modic type I degenerative endplate changes at U5/J9. The conus medullaris terminates at L1/L2 and is normal in signal and morphology. Moderate left and mild right psoas and iliopsoas muscle fatty atrophy. Moderate  lumbar paraspinal muscle atrophy. There is a 1.2 cm perineural cyst at the left  S2 level. Multiple T2 hyperintense structures within the left and right renal pelvises,  [place] characterized but likely peripelvic cysts. There is a T2 hyperintense  structure within the interpolar region of the left kidney with extension into  the renal pelvis which is also incompletely characterized but probably reflects  a parapelvic cyst.    Limited assessment of the visualized soft tissues is otherwise unremarkable. On sagittal sequences only:  T9-T10: Small disc bulge. Effacement of the thecal sac without significant  spinal canal stenosis. No significant foraminal stenosis. T10-T11: Small central protrusion. Flavum hypertrophy.  Effacement of the thecal  sac without significant spinal canal stenosis. There is no significant canal  stenosis. Mild bilateral foraminal stenosis. Correlation of axial and sagittal images:    T11-T12[de-identified] Small disc bulge. Effacement of the thecal sac without significant  spinal canal stenosis. No significant foraminal stenosis. T11-T12: L1 superior endplate compression measuring up to 4 mm. Mild to moderate  central spinal canal stenosis. Flavum hypertrophy. Mild to moderate right and no  significant left foraminal stenosis. L1-2: Small right paracentral protrusion. Posterior dural fat proliferation. Flavum hypertrophy. Mild to moderate bilateral facet arthrosis. Mild spinal  canal stenosis. Moderate bilateral foraminal narrowing. Trace bilateral facet  joint effusions. L2-3: Moderate-sized disc bulge. Flavum hypertrophy. Epidural fat proliferation. Moderate spinal canal stenosis. Mild bilateral foraminal stenosis. Trace  bilateral facet joint effusions. L3-4: Anterolisthesis with uncovering of intervertebral disc. Asymmetric right  greater than left ligamentum flavum hypertrophy. Epidural fat perforation. Moderate severe right and moderate left lateral recess stenosis. Moderate severe  right and mild to moderate left foraminal stenosis. Potential impingement of the  bilateral crossing L4 and nerve roots and exiting right L3 nerve root. Trace  bilateral facet joint effusions. L4-5: Moderate-sized posterior disc osteophyte complex. Flavum hypertrophy. Epidural fat proliferation. No significant spinal canal stenosis. Severe right  and mild to moderate left foraminal stenosis. Impingement of the exiting right  L4 nerve root. L5-S1: Anterolisthesis with uncovering intravertebral disc. Small to moderate  size disc bulge. Bilateral facet arthrosis. Trace right facet effusion. There is  limited spinal canal stenosis. Moderate severe left and moderate right foraminal  stenosis.  Suspected impingement of the exiting left L5 nerve root. Impression IMPRESSION:   1. Subacute high-grade burst type LI vertebral fracture, with retropulsion and  mild to moderate associated spinal canal stenosis. 2. Moderate multilevel degenerative disc disease, ligamentum flavum hypertrophy,  and epidural fat proliferation.  -Mild L1/L2, moderate L2/L3, and moderate to severe L3/L4 spinal canal stenosis. -Multilevel moderate/severe foraminal stenosis. 3. Probable bilateral renal cysts as described. Ultrasound could be considered  for further characterization. Bone density study from 04/02/2018 was personally reviewed with the patient and demonstrated:  IMPRESSION:  1.  BMD measures consistent with osteoporosis. 2.  Compared to the preceding study, BMD has decreased.         Written by Madelyn Hall, as dictated by Ashvin Meraz MD.  I, Dr. Ashvin Meraz confirm that all documentation is accurate.

## 2020-03-17 NOTE — LETTER
3/20/20 Patient: Charity Gamble YOB: 1931 Date of Visit: 3/17/2020 Rach Ford MD 
[address] [facility] [address] [facility] [place] [address] VIA Facsimile: [phone] Dear Rach Ford MD, Thank you for referring Ms. Chu Santacruz to [address] [facility] for evaluation. My notes for this consultation are attached. If you have questions, please do not hesitate to call me. I look forward to following your patient along with you. Sincerely, Luis Daniel Jurado MD

## 2020-03-17 NOTE — TELEPHONE ENCOUNTER
Patients daughter, Nena Mesa (ok per HIPAA), called to see if today's results can be reviewed over the phone instead of brining patient into office. Please advise Vicente Szymanski at [phone].

## 2020-03-17 NOTE — PATIENT INSTRUCTIONS
Lumbar Spinal Stenosis: Care Instructions Your Care Instructions Stenosis in the spine is a narrowing of the canal that is around the spinal cord and nerve roots in your back. It can happen as part of aging. Sometimes bone and other tissue grow into this canal and press on the nerves that branch out from the spinal cord. This can cause pain, numbness, and weakness. When it happens in the lower part of your back, it is called lumbar spinal stenosis. It can cause problems in the legs, feet, and rear end (buttocks). You may be able to get relief from the symptoms of spinal stenosis by taking pain medicine. Your doctor may suggest physical therapy and exercises to keep your spine strong and flexible. Some people try steroid shots to reduce swelling. If pain and numbness in your legs are still so bad that you cannot do your normal activities, you may need surgery. Follow-up care is a key part of your treatment and safety. Be sure to make and go to all appointments, and call your doctor if you are having problems. It's also a good idea to know your test results and keep a list of the medicines you take. How can you care for yourself at home? · Take an over-the-counter pain medicine. Nonsteroidal anti-inflammatory drugs (NSAIDs) such as ibuprofen or naproxen seem to work best. But if you can't take NSAIDs, you can try acetaminophen. Be safe with medicines. Read and follow all instructions on the label. · Do not take two or more pain medicines at the same time unless the doctor told you to. Many pain medicines have acetaminophen, which is Tylenol. Too much acetaminophen (Tylenol) can be harmful. · Stay at a healthy weight. Being overweight puts extra strain on your spine. · Change positions often when you sit or stand. This can ease pain. It may also reduce pressure on the spinal cord and its nerves. · Avoid doing things that make your symptoms worse.  Walking downhill and standing for a long time may cause pain. · Stretch and strengthen your back muscles as your doctor or physical therapist recommends. If your doctor says it is okay to do them, these exercises may help. ? Lie on your back with your knees bent. Gently pull one bent knee to your chest. Put that foot back on the floor, and then pull the other knee to your chest. 
? Do pelvic tilts. Lie on your back with your knees bent. Tighten your stomach muscles. Pull your belly button (navel) in and up toward your ribs. You should feel like your back is pressing to the floor and your hips and pelvis are slightly lifting off the floor. Hold for 6 seconds while breathing smoothly. ? Stand with your back flat against a wall. Slowly slide down until your knees are slightly bent. Hold for 10 seconds, then slide back up the wall. · Remove or change anything in your house that may cause you to fall. Keep walkways clear of clutter, electrical cords, and throw rugs. When should you call for help? Call 911 anytime you think you may need emergency care. For example, call if: 
  · You are unable to move a leg at all.  
[facility] your doctor now or seek immediate medical care if: 
  · You have new or worse symptoms in your legs, belly, or buttocks. Symptoms may include: 
? Numbness or tingling. ? Weakness. ? Pain.  
  · You lose bladder or bowel control.  
 Watch closely for changes in your health, and be sure to contact your doctor if: 
  · You have a fever, lose weight, or don't feel well.  
  · You are not getting better as expected. Where can you learn more? Go to http://montana-osmany.info/ Hortencia Hoyt in the search box to learn more about \"Lumbar Spinal Stenosis: Care Instructions. \" Current as of: June 26, 2019Content Version: 12.4 © 2137-7220 Healthwise, Incorporated. Care instructions adapted under license by [employer] (which disclaims liability or warranty for this information).  If you have questions about a medical condition or this instruction, always ask your healthcare professional. [address] any warranty or liability for your use of this information. Compression Fracture of the Spine: Care Instructions Your Care Instructions A compression fracture happens when the front part of a spinal bone breaks and collapses. A fall or other accident can cause it. A minor injury or moving the wrong way can cause a break if you have thin or brittle bones (osteoporosis). These types of breaks will heal in 8 to 10 weeks. You will need rest and pain medicines. Your doctor may recommend physical therapy. Some doctors recommend that certain people with compression fractures wear braces. Your doctor also may treat thin or brittle bones. You may need surgery if you have lasting pain or if the bone presses on the spinal cord or nerves. You heal best when you take good care of yourself. Eat a variety of healthy foods, and don't smoke. Follow-up care is a key part of your treatment and safety. Be sure to make and go to all appointments, and call your doctor if you are having problems. It's also a good idea to know your test results and keep a list of the medicines you take. How can you care for yourself at home? · Be safe with medicines. Read and follow all instructions on the label. ? If the doctor gave you a prescription medicine for pain, take it as prescribed. ? If you are not taking a prescription pain medicine, ask your doctor if you can take an over-the-counter medicine. · Talk to your doctor about how to make your bones stronger. You may need medicines or a change in what you eat. · Be active only as directed by your doctor. When should you call for help? Call 911 anytime you think you may need emergency care. For example, call if: 
  · You are unable to move an arm or a leg at all.  
[facility] your doctor now or seek immediate medical care if:   · You have new or worse symptoms in your arms, legs, belly, or buttocks. Symptoms may include: 
? Numbness or tingling. ? Weakness. ? Pain.  
  · You lose bladder or bowel control.  
  · You have belly pain, bloating, vomiting, or nausea.  
 Watch closely for changes in your health, and be sure to contact your doctor if: 
  · You do not get better as expected. Where can you learn more? Go to http://montana-osmany.info/ Enter P445 in the search box to learn more about \"Compression Fracture of the Spine: Care Instructions. \" Current as of: June 26, 2019Content Version: 12.4 © 2143-3271 Healthwise, Incorporated. Care instructions adapted under license by [employer] (which disclaims liability or warranty for this information). If you have questions about a medical condition or this instruction, always ask your healthcare professional. [address] any warranty or liability for your use of this information.

## 2020-06-13 ENCOUNTER — HOME HEALTH ADMISSION (OUTPATIENT)
Dept: HOME HEALTH SERVICES | Facility: HOME HEALTH | Age: 85
End: 2020-06-13
Payer: MEDICARE

## 2020-06-16 ENCOUNTER — HOME CARE VISIT (OUTPATIENT)
Dept: HOME HEALTH SERVICES | Facility: HOME HEALTH | Age: 85
End: 2020-06-16
Payer: MEDICARE

## 2020-06-16 ENCOUNTER — HOME CARE VISIT (OUTPATIENT)
Dept: SCHEDULING | Facility: HOME HEALTH | Age: 85
End: 2020-06-16
Payer: MEDICARE

## 2020-06-16 VITALS
OXYGEN SATURATION: 92 % | TEMPERATURE: 98 F | RESPIRATION RATE: 20 BRPM | SYSTOLIC BLOOD PRESSURE: 140 MMHG | DIASTOLIC BLOOD PRESSURE: 62 MMHG | HEART RATE: 62 BPM

## 2020-06-16 PROCEDURE — 3331090001 HH PPS REVENUE CREDIT

## 2020-06-16 PROCEDURE — 400013 HH SOC

## 2020-06-16 PROCEDURE — G0299 HHS/HOSPICE OF RN EA 15 MIN: HCPCS

## 2020-06-16 PROCEDURE — G0151 HHCP-SERV OF PT,EA 15 MIN: HCPCS

## 2020-06-16 PROCEDURE — 3331090002 HH PPS REVENUE DEBIT

## 2020-06-17 ENCOUNTER — HOME CARE VISIT (OUTPATIENT)
Dept: HOME HEALTH SERVICES | Facility: HOME HEALTH | Age: 85
End: 2020-06-17
Payer: MEDICARE

## 2020-06-17 PROCEDURE — 3331090002 HH PPS REVENUE DEBIT

## 2020-06-17 PROCEDURE — 3331090001 HH PPS REVENUE CREDIT

## 2020-06-18 PROCEDURE — 3331090002 HH PPS REVENUE DEBIT

## 2020-06-18 PROCEDURE — 3331090001 HH PPS REVENUE CREDIT

## 2020-06-19 ENCOUNTER — HOME CARE VISIT (OUTPATIENT)
Dept: SCHEDULING | Facility: HOME HEALTH | Age: 85
End: 2020-06-19
Payer: MEDICARE

## 2020-06-19 VITALS
OXYGEN SATURATION: 98 % | HEART RATE: 62 BPM | DIASTOLIC BLOOD PRESSURE: 62 MMHG | SYSTOLIC BLOOD PRESSURE: 140 MMHG | TEMPERATURE: 98 F | RESPIRATION RATE: 20 BRPM

## 2020-06-19 VITALS
HEART RATE: 83 BPM | RESPIRATION RATE: 18 BRPM | TEMPERATURE: 98.2 F | OXYGEN SATURATION: 94 % | SYSTOLIC BLOOD PRESSURE: 108 MMHG | DIASTOLIC BLOOD PRESSURE: 80 MMHG

## 2020-06-19 PROCEDURE — G0156 HHCP-SVS OF AIDE,EA 15 MIN: HCPCS

## 2020-06-19 PROCEDURE — 3331090001 HH PPS REVENUE CREDIT

## 2020-06-19 PROCEDURE — G0157 HHC PT ASSISTANT EA 15: HCPCS

## 2020-06-19 PROCEDURE — G0299 HHS/HOSPICE OF RN EA 15 MIN: HCPCS

## 2020-06-19 PROCEDURE — 3331090002 HH PPS REVENUE DEBIT

## 2020-06-20 PROCEDURE — 3331090001 HH PPS REVENUE CREDIT

## 2020-06-20 PROCEDURE — 3331090002 HH PPS REVENUE DEBIT

## 2020-06-21 PROCEDURE — 3331090002 HH PPS REVENUE DEBIT

## 2020-06-21 PROCEDURE — 3331090001 HH PPS REVENUE CREDIT

## 2020-06-22 ENCOUNTER — HOME CARE VISIT (OUTPATIENT)
Dept: SCHEDULING | Facility: HOME HEALTH | Age: 85
End: 2020-06-22
Payer: MEDICARE

## 2020-06-22 VITALS
OXYGEN SATURATION: 94 % | TEMPERATURE: 98.4 F | RESPIRATION RATE: 18 BRPM | DIASTOLIC BLOOD PRESSURE: 60 MMHG | SYSTOLIC BLOOD PRESSURE: 130 MMHG | HEART RATE: 72 BPM

## 2020-06-22 PROCEDURE — 3331090002 HH PPS REVENUE DEBIT

## 2020-06-22 PROCEDURE — G0157 HHC PT ASSISTANT EA 15: HCPCS

## 2020-06-22 PROCEDURE — 3331090001 HH PPS REVENUE CREDIT

## 2020-06-23 ENCOUNTER — HOME CARE VISIT (OUTPATIENT)
Dept: SCHEDULING | Facility: HOME HEALTH | Age: 85
End: 2020-06-23
Payer: MEDICARE

## 2020-06-23 VITALS
TEMPERATURE: 97.3 F | RESPIRATION RATE: 20 BRPM | OXYGEN SATURATION: 98 % | HEART RATE: 82 BPM | SYSTOLIC BLOOD PRESSURE: 100 MMHG | DIASTOLIC BLOOD PRESSURE: 64 MMHG

## 2020-06-23 PROCEDURE — G0156 HHCP-SVS OF AIDE,EA 15 MIN: HCPCS

## 2020-06-23 PROCEDURE — G0299 HHS/HOSPICE OF RN EA 15 MIN: HCPCS

## 2020-06-23 PROCEDURE — 3331090002 HH PPS REVENUE DEBIT

## 2020-06-23 PROCEDURE — 3331090001 HH PPS REVENUE CREDIT

## 2020-06-24 ENCOUNTER — HOME CARE VISIT (OUTPATIENT)
Dept: SCHEDULING | Facility: HOME HEALTH | Age: 85
End: 2020-06-24
Payer: MEDICARE

## 2020-06-24 VITALS
OXYGEN SATURATION: 98 % | HEART RATE: 85 BPM | RESPIRATION RATE: 17 BRPM | DIASTOLIC BLOOD PRESSURE: 70 MMHG | SYSTOLIC BLOOD PRESSURE: 120 MMHG | TEMPERATURE: 98.3 F

## 2020-06-24 PROCEDURE — 3331090002 HH PPS REVENUE DEBIT

## 2020-06-24 PROCEDURE — 3331090001 HH PPS REVENUE CREDIT

## 2020-06-24 PROCEDURE — G0157 HHC PT ASSISTANT EA 15: HCPCS

## 2020-06-25 VITALS
SYSTOLIC BLOOD PRESSURE: 110 MMHG | DIASTOLIC BLOOD PRESSURE: 68 MMHG | HEART RATE: 79 BPM | OXYGEN SATURATION: 98 % | RESPIRATION RATE: 20 BRPM | TEMPERATURE: 97.6 F

## 2020-06-25 PROCEDURE — 3331090002 HH PPS REVENUE DEBIT

## 2020-06-25 PROCEDURE — 3331090001 HH PPS REVENUE CREDIT

## 2020-06-26 ENCOUNTER — HOME CARE VISIT (OUTPATIENT)
Dept: SCHEDULING | Facility: HOME HEALTH | Age: 85
End: 2020-06-26
Payer: MEDICARE

## 2020-06-26 PROCEDURE — 3331090002 HH PPS REVENUE DEBIT

## 2020-06-26 PROCEDURE — 3331090001 HH PPS REVENUE CREDIT

## 2020-06-26 PROCEDURE — G0156 HHCP-SVS OF AIDE,EA 15 MIN: HCPCS

## 2020-06-27 ENCOUNTER — HOME CARE VISIT (OUTPATIENT)
Dept: SCHEDULING | Facility: HOME HEALTH | Age: 85
End: 2020-06-27
Payer: MEDICARE

## 2020-06-27 PROCEDURE — 3331090002 HH PPS REVENUE DEBIT

## 2020-06-27 PROCEDURE — 3331090001 HH PPS REVENUE CREDIT

## 2020-06-27 PROCEDURE — G0300 HHS/HOSPICE OF LPN EA 15 MIN: HCPCS

## 2020-06-28 PROCEDURE — 3331090001 HH PPS REVENUE CREDIT

## 2020-06-28 PROCEDURE — 3331090002 HH PPS REVENUE DEBIT

## 2020-06-29 ENCOUNTER — HOME CARE VISIT (OUTPATIENT)
Dept: SCHEDULING | Facility: HOME HEALTH | Age: 85
End: 2020-06-29
Payer: MEDICARE

## 2020-06-29 PROCEDURE — 3331090001 HH PPS REVENUE CREDIT

## 2020-06-29 PROCEDURE — G0157 HHC PT ASSISTANT EA 15: HCPCS

## 2020-06-29 PROCEDURE — 3331090002 HH PPS REVENUE DEBIT

## 2020-06-30 ENCOUNTER — HOME CARE VISIT (OUTPATIENT)
Dept: SCHEDULING | Facility: HOME HEALTH | Age: 85
End: 2020-06-30
Payer: MEDICARE

## 2020-06-30 VITALS
HEART RATE: 87 BPM | DIASTOLIC BLOOD PRESSURE: 82 MMHG | SYSTOLIC BLOOD PRESSURE: 136 MMHG | TEMPERATURE: 98.4 F | OXYGEN SATURATION: 96 %

## 2020-06-30 PROCEDURE — 3331090002 HH PPS REVENUE DEBIT

## 2020-06-30 PROCEDURE — 3331090001 HH PPS REVENUE CREDIT

## 2020-06-30 PROCEDURE — G0156 HHCP-SVS OF AIDE,EA 15 MIN: HCPCS

## 2020-07-01 ENCOUNTER — HOME CARE VISIT (OUTPATIENT)
Dept: SCHEDULING | Facility: HOME HEALTH | Age: 85
End: 2020-07-01
Payer: MEDICARE

## 2020-07-01 VITALS
RESPIRATION RATE: 17 BRPM | HEART RATE: 68 BPM | DIASTOLIC BLOOD PRESSURE: 80 MMHG | OXYGEN SATURATION: 93 % | SYSTOLIC BLOOD PRESSURE: 150 MMHG | TEMPERATURE: 98 F

## 2020-07-01 VITALS
HEART RATE: 66 BPM | OXYGEN SATURATION: 97 % | SYSTOLIC BLOOD PRESSURE: 110 MMHG | DIASTOLIC BLOOD PRESSURE: 70 MMHG | TEMPERATURE: 97.9 F

## 2020-07-01 PROCEDURE — G0157 HHC PT ASSISTANT EA 15: HCPCS

## 2020-07-01 PROCEDURE — 3331090001 HH PPS REVENUE CREDIT

## 2020-07-01 PROCEDURE — G0300 HHS/HOSPICE OF LPN EA 15 MIN: HCPCS

## 2020-07-01 PROCEDURE — 3331090002 HH PPS REVENUE DEBIT

## 2020-07-02 PROCEDURE — 3331090002 HH PPS REVENUE DEBIT

## 2020-07-02 PROCEDURE — 3331090001 HH PPS REVENUE CREDIT

## 2020-07-03 ENCOUNTER — HOME CARE VISIT (OUTPATIENT)
Dept: SCHEDULING | Facility: HOME HEALTH | Age: 85
End: 2020-07-03
Payer: MEDICARE

## 2020-07-03 PROCEDURE — G0299 HHS/HOSPICE OF RN EA 15 MIN: HCPCS

## 2020-07-03 PROCEDURE — 3331090002 HH PPS REVENUE DEBIT

## 2020-07-03 PROCEDURE — G0156 HHCP-SVS OF AIDE,EA 15 MIN: HCPCS

## 2020-07-03 PROCEDURE — 3331090001 HH PPS REVENUE CREDIT

## 2020-07-04 VITALS
RESPIRATION RATE: 20 BRPM | TEMPERATURE: 97.9 F | OXYGEN SATURATION: 96 % | SYSTOLIC BLOOD PRESSURE: 118 MMHG | DIASTOLIC BLOOD PRESSURE: 62 MMHG | HEART RATE: 70 BPM

## 2020-07-04 PROCEDURE — 3331090001 HH PPS REVENUE CREDIT

## 2020-07-04 PROCEDURE — 3331090002 HH PPS REVENUE DEBIT

## 2020-07-05 PROCEDURE — 3331090002 HH PPS REVENUE DEBIT

## 2020-07-05 PROCEDURE — 3331090001 HH PPS REVENUE CREDIT

## 2020-07-06 VITALS
OXYGEN SATURATION: 98 % | RESPIRATION RATE: 16 BRPM | HEART RATE: 65 BPM | SYSTOLIC BLOOD PRESSURE: 103 MMHG | DIASTOLIC BLOOD PRESSURE: 63 MMHG

## 2020-07-06 PROCEDURE — 3331090001 HH PPS REVENUE CREDIT

## 2020-07-06 PROCEDURE — 3331090002 HH PPS REVENUE DEBIT

## 2020-07-07 ENCOUNTER — HOME CARE VISIT (OUTPATIENT)
Dept: SCHEDULING | Facility: HOME HEALTH | Age: 85
End: 2020-07-07
Payer: MEDICARE

## 2020-07-07 ENCOUNTER — HOME CARE VISIT (OUTPATIENT)
Dept: HOME HEALTH SERVICES | Facility: HOME HEALTH | Age: 85
End: 2020-07-07
Payer: MEDICARE

## 2020-07-07 PROCEDURE — 3331090001 HH PPS REVENUE CREDIT

## 2020-07-07 PROCEDURE — G0156 HHCP-SVS OF AIDE,EA 15 MIN: HCPCS

## 2020-07-07 PROCEDURE — 3331090002 HH PPS REVENUE DEBIT

## 2020-07-08 ENCOUNTER — HOME CARE VISIT (OUTPATIENT)
Dept: SCHEDULING | Facility: HOME HEALTH | Age: 85
End: 2020-07-08
Payer: MEDICARE

## 2020-07-08 VITALS
DIASTOLIC BLOOD PRESSURE: 66 MMHG | SYSTOLIC BLOOD PRESSURE: 108 MMHG | HEART RATE: 73 BPM | OXYGEN SATURATION: 95 % | TEMPERATURE: 96.6 F

## 2020-07-08 PROCEDURE — 3331090001 HH PPS REVENUE CREDIT

## 2020-07-08 PROCEDURE — 3331090002 HH PPS REVENUE DEBIT

## 2020-07-08 PROCEDURE — G0151 HHCP-SERV OF PT,EA 15 MIN: HCPCS

## 2020-07-09 ENCOUNTER — HOME CARE VISIT (OUTPATIENT)
Dept: SCHEDULING | Facility: HOME HEALTH | Age: 85
End: 2020-07-09
Payer: MEDICARE

## 2020-07-09 VITALS
OXYGEN SATURATION: 96 % | TEMPERATURE: 97.8 F | DIASTOLIC BLOOD PRESSURE: 80 MMHG | RESPIRATION RATE: 17 BRPM | HEART RATE: 64 BPM | SYSTOLIC BLOOD PRESSURE: 124 MMHG

## 2020-07-09 PROCEDURE — 3331090001 HH PPS REVENUE CREDIT

## 2020-07-09 PROCEDURE — G0157 HHC PT ASSISTANT EA 15: HCPCS

## 2020-07-09 PROCEDURE — 3331090002 HH PPS REVENUE DEBIT

## 2020-07-10 ENCOUNTER — HOME CARE VISIT (OUTPATIENT)
Dept: SCHEDULING | Facility: HOME HEALTH | Age: 85
End: 2020-07-10
Payer: MEDICARE

## 2020-07-10 PROCEDURE — 3331090001 HH PPS REVENUE CREDIT

## 2020-07-10 PROCEDURE — G0156 HHCP-SVS OF AIDE,EA 15 MIN: HCPCS

## 2020-07-10 PROCEDURE — 3331090002 HH PPS REVENUE DEBIT

## 2020-07-10 PROCEDURE — G0299 HHS/HOSPICE OF RN EA 15 MIN: HCPCS

## 2020-07-11 PROCEDURE — 3331090001 HH PPS REVENUE CREDIT

## 2020-07-11 PROCEDURE — 3331090002 HH PPS REVENUE DEBIT

## 2020-07-12 PROCEDURE — 3331090002 HH PPS REVENUE DEBIT

## 2020-07-12 PROCEDURE — 3331090001 HH PPS REVENUE CREDIT

## 2020-07-13 PROCEDURE — 3331090002 HH PPS REVENUE DEBIT

## 2020-07-13 PROCEDURE — 3331090001 HH PPS REVENUE CREDIT

## 2020-07-14 ENCOUNTER — HOME CARE VISIT (OUTPATIENT)
Dept: SCHEDULING | Facility: HOME HEALTH | Age: 85
End: 2020-07-14
Payer: MEDICARE

## 2020-07-14 VITALS
TEMPERATURE: 98.1 F | DIASTOLIC BLOOD PRESSURE: 72 MMHG | RESPIRATION RATE: 20 BRPM | SYSTOLIC BLOOD PRESSURE: 110 MMHG | HEART RATE: 76 BPM | OXYGEN SATURATION: 98 %

## 2020-07-14 PROCEDURE — 3331090001 HH PPS REVENUE CREDIT

## 2020-07-14 PROCEDURE — 3331090002 HH PPS REVENUE DEBIT

## 2020-07-14 PROCEDURE — G0157 HHC PT ASSISTANT EA 15: HCPCS

## 2020-07-15 VITALS
RESPIRATION RATE: 17 BRPM | HEART RATE: 83 BPM | OXYGEN SATURATION: 98 % | DIASTOLIC BLOOD PRESSURE: 80 MMHG | TEMPERATURE: 98.2 F | SYSTOLIC BLOOD PRESSURE: 116 MMHG

## 2020-07-15 PROCEDURE — 3331090002 HH PPS REVENUE DEBIT

## 2020-07-15 PROCEDURE — 3331090001 HH PPS REVENUE CREDIT

## 2020-07-16 ENCOUNTER — HOME CARE VISIT (OUTPATIENT)
Dept: SCHEDULING | Facility: HOME HEALTH | Age: 85
End: 2020-07-16
Payer: MEDICARE

## 2020-07-16 VITALS
HEART RATE: 69 BPM | DIASTOLIC BLOOD PRESSURE: 64 MMHG | SYSTOLIC BLOOD PRESSURE: 118 MMHG | TEMPERATURE: 97.9 F | OXYGEN SATURATION: 95 % | RESPIRATION RATE: 17 BRPM

## 2020-07-16 PROCEDURE — 3331090001 HH PPS REVENUE CREDIT

## 2020-07-16 PROCEDURE — 3331090002 HH PPS REVENUE DEBIT

## 2020-07-16 PROCEDURE — 400013 HH SOC

## 2020-07-16 PROCEDURE — G0157 HHC PT ASSISTANT EA 15: HCPCS

## 2020-07-17 PROCEDURE — 3331090001 HH PPS REVENUE CREDIT

## 2020-07-17 PROCEDURE — 3331090002 HH PPS REVENUE DEBIT

## 2020-07-18 PROCEDURE — 3331090001 HH PPS REVENUE CREDIT

## 2020-07-18 PROCEDURE — 3331090002 HH PPS REVENUE DEBIT

## 2020-07-19 PROCEDURE — 3331090002 HH PPS REVENUE DEBIT

## 2020-07-19 PROCEDURE — 3331090001 HH PPS REVENUE CREDIT

## 2020-07-20 PROCEDURE — 3331090002 HH PPS REVENUE DEBIT

## 2020-07-20 PROCEDURE — 3331090001 HH PPS REVENUE CREDIT

## 2020-07-21 PROCEDURE — 3331090001 HH PPS REVENUE CREDIT

## 2020-07-21 PROCEDURE — 3331090002 HH PPS REVENUE DEBIT

## 2020-07-22 ENCOUNTER — HOME CARE VISIT (OUTPATIENT)
Dept: SCHEDULING | Facility: HOME HEALTH | Age: 85
End: 2020-07-22
Payer: MEDICARE

## 2020-07-22 PROCEDURE — 3331090002 HH PPS REVENUE DEBIT

## 2020-07-22 PROCEDURE — G0151 HHCP-SERV OF PT,EA 15 MIN: HCPCS

## 2020-07-22 PROCEDURE — 3331090001 HH PPS REVENUE CREDIT

## 2020-07-23 PROCEDURE — 3331090001 HH PPS REVENUE CREDIT

## 2020-07-23 PROCEDURE — 3331090002 HH PPS REVENUE DEBIT

## 2020-07-24 VITALS
DIASTOLIC BLOOD PRESSURE: 68 MMHG | TEMPERATURE: 97.5 F | HEART RATE: 83 BPM | SYSTOLIC BLOOD PRESSURE: 122 MMHG | OXYGEN SATURATION: 92 %

## 2020-07-24 PROCEDURE — 3331090001 HH PPS REVENUE CREDIT

## 2020-07-24 PROCEDURE — 3331090002 HH PPS REVENUE DEBIT

## 2020-07-25 PROCEDURE — 3331090002 HH PPS REVENUE DEBIT

## 2020-07-25 PROCEDURE — 3331090001 HH PPS REVENUE CREDIT

## 2020-07-26 PROCEDURE — 3331090002 HH PPS REVENUE DEBIT

## 2020-07-26 PROCEDURE — 3331090001 HH PPS REVENUE CREDIT

## 2021-10-13 ENCOUNTER — HOME HEALTH ADMISSION (OUTPATIENT)
Dept: HOME HEALTH SERVICES | Facility: HOME HEALTH | Age: 86
End: 2021-10-13
Payer: MEDICARE

## 2021-10-15 ENCOUNTER — HOME CARE VISIT (OUTPATIENT)
Dept: HOME HEALTH SERVICES | Facility: HOME HEALTH | Age: 86
End: 2021-10-15
Payer: MEDICARE

## 2021-10-15 ENCOUNTER — HOME CARE VISIT (OUTPATIENT)
Dept: SCHEDULING | Facility: HOME HEALTH | Age: 86
End: 2021-10-15
Payer: MEDICARE

## 2021-10-15 PROCEDURE — 400013 HH SOC

## 2021-10-15 PROCEDURE — G0151 HHCP-SERV OF PT,EA 15 MIN: HCPCS

## 2021-10-15 PROCEDURE — 400018 HH-NO PAY CLAIM PROCEDURE

## 2021-10-15 PROCEDURE — 3331090001 HH PPS REVENUE CREDIT

## 2021-10-15 PROCEDURE — 3331090002 HH PPS REVENUE DEBIT

## 2021-10-16 VITALS
OXYGEN SATURATION: 96 % | DIASTOLIC BLOOD PRESSURE: 62 MMHG | RESPIRATION RATE: 12 BRPM | HEART RATE: 79 BPM | SYSTOLIC BLOOD PRESSURE: 108 MMHG | TEMPERATURE: 97.3 F

## 2021-10-16 PROCEDURE — 3331090001 HH PPS REVENUE CREDIT

## 2021-10-16 PROCEDURE — 3331090002 HH PPS REVENUE DEBIT

## 2021-10-17 PROCEDURE — 3331090002 HH PPS REVENUE DEBIT

## 2021-10-17 PROCEDURE — 3331090001 HH PPS REVENUE CREDIT

## 2021-10-17 NOTE — HOME HEALTH
Joce Duque PATIENT EDUCATION PROVIDED THIS VISIT:  Home safety to include use of AD, HEP, walking, deep breathing, HIPPA, HH BOR     HEP consisting of:  1. Walking every hour during the day with walker   2. Stand up 5 times from chair  Written HEP issued, patient/caregiver verbalized understanding; however, will need reinforcement and continued education for progression of HEP. Joce Duque ASSESSMENT AND CONTINUED NEED FOR THE FOLLOWING SKILLS:  80 y.o. female s/p referral to [place] [facility] due to an incident where she was unable to stand up from her couch. Her PCP was notified due to concerns re: falling and injury. HH PT is medically necessary to provide skilled interventions in order to restore independent function and improve patient safety in the home by increasing strength, balance, activity tolerance, transfer independence. POC:  Patient/caregiver instructed on POC and are agreeable to POC at this time. POC and admission to home health status called to Dr. Shoaib Dickinson, re: Linda Hurley note. PLAN: HH PT 1w1, 2w4, 1w1    DISCHARGE PLANNING DISCUSSED: Discharge to self and family under MD supervision once all goals have been met or patient has reached max potential. Patient/caregiver verbalized understanding. PMHx:    List of Comorbitites:    Arthritis    Essential Hypertension    Unsteady Gait    Impaired ambulation    HTN    Osteoporosis    High cholesterol    Hx. Fall   . SUBJECTIVE:  80 y.o. female states she wants to get shronger and not be stuck in a chair. She is independent minded and occupies her time knitting for her family and friends. LIVING SITUATION:  Lives alone in a 2 family home, rents 2nd floor apartment to a man who assists with house hold chores. Daughter lives next door to patient and cimes by daily to use piano for teaching piano lessons, and to check on patient.   REQUIRES CAREGIVER ASSISTANCE FOR:  ADL safety, meal prep, med support, transportation, errands, household chores, coordination of care.  PLOF:  Distant supervision by daughter, able to care for self indep. Jory Bustillos MEDICATIONS RECONCILED AND UPDATED AS FOLLOWS: No issuea. High risk medication teaching regarding anticoagulants, hyperglycemic agents or opioid narcotics performed for: n/a. Jory GÓMEZ MD APPT:  [facility]. Patient/caregiver encouraged/instructed to keep appointment as lack of follow through with physician appointment could result in discontinuation of home care services for non-compliance. .  ROM:  BLEs WFL  STRENGTH: B hips: 3+/5, B knees: 3/5, see PT ROM. WOUNDS:  n/a   BED MOBILITY: Hospital bed, no issues  TRANSFERS: SBA due to weakness  GAIT: FWW. Gait characterized by flexed posture. short uneven step lengths, frequent stopping, narrow BOSm dec heel strike. Cues/instruction provided for upright posture. STAIRS:  Unable to assess today  BALANCE:  Pt scored 11/28 on Tinetti Balance Assessment placing patient at high risk for falls.

## 2021-10-18 PROCEDURE — 3331090002 HH PPS REVENUE DEBIT

## 2021-10-18 PROCEDURE — 3331090001 HH PPS REVENUE CREDIT

## 2021-10-19 ENCOUNTER — HOME CARE VISIT (OUTPATIENT)
Dept: SCHEDULING | Facility: HOME HEALTH | Age: 86
End: 2021-10-19
Payer: MEDICARE

## 2021-10-19 VITALS
OXYGEN SATURATION: 96 % | DIASTOLIC BLOOD PRESSURE: 60 MMHG | HEART RATE: 77 BPM | SYSTOLIC BLOOD PRESSURE: 100 MMHG | TEMPERATURE: 97.3 F

## 2021-10-19 PROCEDURE — 3331090001 HH PPS REVENUE CREDIT

## 2021-10-19 PROCEDURE — G0157 HHC PT ASSISTANT EA 15: HCPCS

## 2021-10-19 PROCEDURE — 3331090002 HH PPS REVENUE DEBIT

## 2021-10-19 NOTE — HOME HEALTH
DOCTOR'S VISIT:  TBD by Dr Mckay:  Pt reports that Dr Hussein Stearns makes housecalls and was here recently. Pt c/o LBP w standing or walking for long periods. \"I sat on the sunroom couch that is too low and could not get up. \"    NARRATIVE:  Pt amb w RW to open front door for therapist and then back to her bedroom for >50 feet total amb w Mod I w parking RW at bedroom door to furniture walk into room to electric recliner. Pt ed educated on research backing up the need for gentle ther ex to keep arthritic joints health, strong, and flexible so that she can do more in her daily life. Instructed in, gave handout for, and pt performed sitting ther ex for HEP for ALEXANDER LEs x 10 ea:  LAQ w 5 sec hold, HS curls, Marches, Hip ADD/ABD, HR/TR, chair push-ups x 5, and unsupported sit w MC/VC for upright posture w thoracic ext wo chin or shoulder girdle lift. Pt was only able to push-up once to lift bottom of seat of recliner. Pt sit<>stand from recliner x 1 w BUE push-up w audible ALEXANDER knee crepitus. CAREGIVER INVOLVEMENT/ASSISTANCE NEEDED FOR:  Pt's daughter and renter upstairs assists w ADLs, medications, meals, and transportation. HOME HEALTH SUPPLIES by type and quantity ordered/delivered this visit include: None      ASSESSMENT AND PROGRESS TOWARD GOALS:    Progressing well toward goals for gait, transfers, balance and strength w amb w RW > 50 feet total amb w Mod I and performed 5 seat BLE and 1 seated BUE ex wo c/o increased joint pain. Patient continues to have deficits in gait, transfers, balance and strength due to OA bilateral knee and poor hip girdle & trunk strength. Patient will benefit from continued intervention with progression of all PT goals to improve functional independence, prevent falls, decrease burden of care, and improve quality of life.       CONTINUED NEED FOR THE FOLLOWING SKILLS:  HH PT is medically necessary to  address pain, decreased ROM of BLE, decreased strength, decreased independence with functional transfers, impaired gait, impaired stair negotiation, and impaired balance in order to improve functional independence, quality of life, return to PLOF, and reduce the risk for falls. PLAN FOR NEXT VISIT:  Standing ther ex and static balance    DISCHARGE PLANNING was discussed with the pt/caregiver:   Frequency: 2wk4, 1wk1 remaining with anticipated DC on 11/15.

## 2021-10-20 PROCEDURE — 3331090001 HH PPS REVENUE CREDIT

## 2021-10-20 PROCEDURE — 3331090002 HH PPS REVENUE DEBIT

## 2021-10-21 ENCOUNTER — HOME CARE VISIT (OUTPATIENT)
Dept: SCHEDULING | Facility: HOME HEALTH | Age: 86
End: 2021-10-21
Payer: MEDICARE

## 2021-10-21 VITALS
DIASTOLIC BLOOD PRESSURE: 66 MMHG | HEART RATE: 61 BPM | TEMPERATURE: 97.7 F | SYSTOLIC BLOOD PRESSURE: 121 MMHG | OXYGEN SATURATION: 96 %

## 2021-10-21 PROCEDURE — 3331090001 HH PPS REVENUE CREDIT

## 2021-10-21 PROCEDURE — G0157 HHC PT ASSISTANT EA 15: HCPCS

## 2021-10-21 PROCEDURE — 3331090002 HH PPS REVENUE DEBIT

## 2021-10-21 NOTE — HOME HEALTH
DOCTOR'S VISIT:  TBD-daughter keeps schedule    SUBJECTIVE:  Pt reports that she was sore from PT treatment, but thinks it was her muscles. Pt reports that she gets tired and has ALEXANDER knee pain when she has to feed her cat and give it a pill in the morning. NARRATIVE:  Pt education again on how gentle exercise can make OA feel better in 1-2 weeks. Instructed in, gave handout for, and pt performed Standing HEP for LLE x 10: Hip abd, hip ext, hip flexion, HS curls, minisquats, and  HR/TR w MC/VC for correct form and upright posture w TA draw w 2 seated rest.  Pt reports min increased pain while performing ther ex R>L, that resolved w sitting. Pt ed on doing 1 session of the standing ther ex and 2 of the sitting. Pt again opened door for therapist and then walked to front door to close it w RW w Mod I x 28 feet x 2 w shuffling gait, probably due to slippers. CAREGIVER INVOLVEMENT/ASSISTANCE NEEDED FOR:  Pt's daughter and renter upstairs assists w ADLs, medications, meals, and transportation. HOME HEALTH SUPPLIES by type and quantity ordered/delivered this visit include: None    ASSESSMENT AND PROGRESS TOWARD GOALS:    Progressing well toward goals for gait, transfers, balance and strength w performance of 6 standing ther ex w 2 seated rest breaks w min increased ALEXANDER knee pain that pt reports resolved w sitting. Patient continues to have deficits in gait, transfers, balance and strength due to OA bilateral knee and poor hip girdle & trunk strength. Patient will benefit from continued intervention with progression of all PT goals to improve functional independence, prevent falls, decrease burden of care, and improve quality of life.     CONTINUED NEED FOR THE FOLLOWING SKILLS:  HH PT is medically necessary to  address pain, decreased ROM of BLE, decreased strength, decreased independence with functional transfers, impaired gait, impaired stair negotiation, and impaired balance in order to improve functional independence, quality of life, return to PLOF, and reduce the risk for falls. PLAN FOR NEXT VISIT:  Static balance and amb in home w transfers    [address] was discussed with the pt/caregiver:   Frequency: 2wk4, 1wk1, w 2wk3, 1wk1 remaining with anticipated DC on 11/15.

## 2021-10-22 ENCOUNTER — HOME CARE VISIT (OUTPATIENT)
Dept: HOME HEALTH SERVICES | Facility: HOME HEALTH | Age: 86
End: 2021-10-22
Payer: MEDICARE

## 2021-10-22 PROCEDURE — 3331090001 HH PPS REVENUE CREDIT

## 2021-10-22 PROCEDURE — 3331090002 HH PPS REVENUE DEBIT

## 2021-10-23 PROCEDURE — 3331090002 HH PPS REVENUE DEBIT

## 2021-10-23 PROCEDURE — 3331090001 HH PPS REVENUE CREDIT

## 2021-10-24 PROCEDURE — 3331090002 HH PPS REVENUE DEBIT

## 2021-10-24 PROCEDURE — 3331090001 HH PPS REVENUE CREDIT

## 2021-10-25 PROCEDURE — 3331090001 HH PPS REVENUE CREDIT

## 2021-10-25 PROCEDURE — 3331090002 HH PPS REVENUE DEBIT

## 2021-10-26 ENCOUNTER — HOME CARE VISIT (OUTPATIENT)
Dept: SCHEDULING | Facility: HOME HEALTH | Age: 86
End: 2021-10-26
Payer: MEDICARE

## 2021-10-26 VITALS
TEMPERATURE: 97.3 F | SYSTOLIC BLOOD PRESSURE: 130 MMHG | DIASTOLIC BLOOD PRESSURE: 77 MMHG | HEART RATE: 78 BPM | OXYGEN SATURATION: 95 %

## 2021-10-26 PROCEDURE — G0157 HHC PT ASSISTANT EA 15: HCPCS

## 2021-10-26 PROCEDURE — 3331090002 HH PPS REVENUE DEBIT

## 2021-10-26 PROCEDURE — 3331090001 HH PPS REVENUE CREDIT

## 2021-10-26 NOTE — HOME HEALTH
DOCTOR'S VISIT:  PCP Dr Leonard Renteria makes housecalls    SUBJECTIVE:  Pt reports that her bath aide came today to bathe her & wash her hair, and she was watering plants so she has been very active today. Pt states that she goes to the kitchen several times a day to eat, take, meds, and take care of her cat. Pt reports that she doesn't leave home often. NARRATIVE:  Pt amb w RW w Sup and furniture walking wo AD x 82 feet to water plants and amb back to bedroom. Instructed in, gave handout for, and pt performed static balance w eyes open looking straight ahead w feet 4, 2, and 1 inches apart & together w arms by her sides x 30 sec each w 1 seated rest break. Pt performed bed transfer w good safety and Mod I.  Pt sit<>stand x 2 w BUE push up w Sup. CAREGIVER INVOLVEMENT/ASSISTANCE NEEDED FOR:  Pt's daughter and renter upstairs assists w ADLs, medications, meals, and transportation. HOME HEALTH SUPPLIES by type and quantity ordered/delivered this visit include: None    ASSESSMENT AND PROGRESS TOWARD GOALS:    Progressing well toward goals for gait, transfers, balance and strength w performance of 6 standing ther ex w 2 seated rest breaks w min increased ALEXANDER knee pain that pt reports resolved w sitting. Patient continues to have deficits in gait, transfers, balance and strength due to OA bilateral knee and poor hip girdle & trunk strength. Patient will benefit from continued intervention with progression of all PT goals to improve functional independence, prevent falls, decrease burden of care, and improve quality of life. CONTINUED NEED FOR THE FOLLOWING SKILLS:  HH PT is medically necessary to  address pain, decreased ROM of BLE, decreased strength, decreased independence with functional transfers, impaired gait, impaired stair negotiation, and impaired balance in order to improve functional independence, quality of life, return to PLOF, and reduce the risk for falls.     PLAN FOR NEXT VISIT:  Static balance and dynamic balance    DISCHARGE PLANNING was discussed with the pt/caregiver:   Frequency: 2wk4, 1wk1, w 1wk1, 2wk2, 1wk1 remaining with anticipated DC on 11/15. Suturegard Body: The suture ends were repeatedly re-tightened and re-clamped to achieve the desired tissue expansion.

## 2021-10-27 PROCEDURE — 3331090001 HH PPS REVENUE CREDIT

## 2021-10-27 PROCEDURE — 3331090002 HH PPS REVENUE DEBIT

## 2021-10-28 ENCOUNTER — HOME CARE VISIT (OUTPATIENT)
Dept: SCHEDULING | Facility: HOME HEALTH | Age: 86
End: 2021-10-28
Payer: MEDICARE

## 2021-10-28 PROCEDURE — 3331090002 HH PPS REVENUE DEBIT

## 2021-10-28 PROCEDURE — G0157 HHC PT ASSISTANT EA 15: HCPCS

## 2021-10-28 PROCEDURE — 3331090001 HH PPS REVENUE CREDIT

## 2021-10-28 NOTE — HOME HEALTH
DOCTOR'S VISIT:  Dr Aakash Woods makes housecalls-TBD    SUBJECTIVE:  Pt reports that she feels the same w transfers, but is walking a little better. NARRATIVE:  Pt amb to open door for therapist w RW w Mod I. Instructed in and gave handout for HEP for dynamic balance and hip girdle strengthening:  sidestepping along 6 feet of counter x 2 laps w 2 UE support w MC/VC for correct form; step-overs w 1 UE support A<>P, w right foot then left as the stepping foot, and ALEXANDER UE support R<>L x 10 each; and retro/tandem walking along 6 feet of counter x 2 laps w 1 UE support. Pt needed 2 seated rest breaks due to fatigue. CAREGIVER INVOLVEMENT/ASSISTANCE NEEDED FOR:  Pt's daughter and renter upstairs assists w ADLs, medications, meals, and transportation. HOME HEALTH SUPPLIES by type and quantity ordered/delivered this visit include: None  ASSESSMENT AND PROGRESS TOWARD GOALS:    Progressing well toward goals for gait, transfers, balance and strength w performance of 4 standing dynamic balance ex w 2 seated rest breaks w min increased ALEXANDER knee pain that pt reports resolved w sitting. Patient continues to have deficits in gait, transfers, balance and strength due to age related OA bilateral knee and poor hip girdle & trunk strength. Patient will benefit from continued intervention with progression of all PT goals to improve functional independence, prevent falls, decrease burden of care, and improve quality of life. CONTINUED NEED FOR THE FOLLOWING SKILLS:  HH PT is medically necessary to  address pain, decreased ROM of BLE, decreased strength, decreased independence with functional transfers, impaired gait, impaired stair negotiation, and impaired balance in order to improve functional independence, quality of life, return to PLOF, and reduce the risk for falls.     PLAN FOR NEXT VISIT:  Static balance and dynamic balance    DISCHARGE PLANNING was discussed with the pt/caregiver:   Frequency: 2wk4, 1wk1, w 2wk2, 1wk1 remaining with anticipated DC on 11/15.

## 2021-10-29 PROCEDURE — 3331090001 HH PPS REVENUE CREDIT

## 2021-10-29 PROCEDURE — 3331090002 HH PPS REVENUE DEBIT

## 2021-10-30 PROCEDURE — 3331090001 HH PPS REVENUE CREDIT

## 2021-10-30 PROCEDURE — 3331090002 HH PPS REVENUE DEBIT

## 2021-10-31 VITALS
SYSTOLIC BLOOD PRESSURE: 114 MMHG | TEMPERATURE: 97.7 F | DIASTOLIC BLOOD PRESSURE: 59 MMHG | HEART RATE: 71 BPM | OXYGEN SATURATION: 96 %

## 2021-10-31 PROCEDURE — 3331090001 HH PPS REVENUE CREDIT

## 2021-10-31 PROCEDURE — 3331090002 HH PPS REVENUE DEBIT

## 2021-11-01 PROCEDURE — 3331090002 HH PPS REVENUE DEBIT

## 2021-11-01 PROCEDURE — 3331090001 HH PPS REVENUE CREDIT

## 2021-11-02 ENCOUNTER — HOME CARE VISIT (OUTPATIENT)
Dept: SCHEDULING | Facility: HOME HEALTH | Age: 86
End: 2021-11-02
Payer: MEDICARE

## 2021-11-02 VITALS
SYSTOLIC BLOOD PRESSURE: 138 MMHG | OXYGEN SATURATION: 98 % | HEART RATE: 63 BPM | TEMPERATURE: 97.3 F | DIASTOLIC BLOOD PRESSURE: 77 MMHG

## 2021-11-02 PROCEDURE — 3331090001 HH PPS REVENUE CREDIT

## 2021-11-02 PROCEDURE — G0157 HHC PT ASSISTANT EA 15: HCPCS

## 2021-11-02 PROCEDURE — 3331090002 HH PPS REVENUE DEBIT

## 2021-11-03 PROCEDURE — 3331090001 HH PPS REVENUE CREDIT

## 2021-11-03 PROCEDURE — 3331090002 HH PPS REVENUE DEBIT

## 2021-11-04 ENCOUNTER — HOME CARE VISIT (OUTPATIENT)
Dept: SCHEDULING | Facility: HOME HEALTH | Age: 86
End: 2021-11-04
Payer: MEDICARE

## 2021-11-04 PROCEDURE — 3331090002 HH PPS REVENUE DEBIT

## 2021-11-04 PROCEDURE — G0157 HHC PT ASSISTANT EA 15: HCPCS

## 2021-11-04 PROCEDURE — 3331090001 HH PPS REVENUE CREDIT

## 2021-11-04 NOTE — Clinical Note
Thank you  ----- Message -----  From: Kim Krishna, PTA  Sent: 11/7/2021   6:40 PM EST  To: Ayush Dutton, PT      Pt reports falling on 11/4. She ate something, she says, that upset her stomach and she had diarrhea. She states that she had gone once, but then had to go a second time, but she fell in the bathroom while hurrying to the toilet. Her daughter informed therapist that the man who rents upstairs said that he heard her fall, went to see about her, and found her on the floor w BLEs covered in feces. Pt states that she is only bruised on her left forearm, ALEXANDER shins,  and her neck & left knee hurt a little. Pt to have Post Fall Assessment on 11/9. Pt denies hitting her head or LOC.

## 2021-11-04 NOTE — HOME HEALTH
DOCTOR'S VISIT: 
TBD- PCP makes house calls as needed SUBJECTIVE: 
Pt's daughter reports that pt fell hurrying to the bathroom. Pt states that she had diarrhea, was running to the bath, that she thinks that she tripped or fell over the RW, and that she is bruised only on her left forearm, that her left knee & neck hurt a little. NARRATIVE: 
Observed all of pt's limbs and asked her to move them to find 3 bruises on left forearm & on ALEXANDER shins, that both shoulders are hurting w arm motions, and her usually painful left knee hurts more than usual as does her neck. Pt sit <> stand from elevated lift recliner x 1 w SBA w slower than usual motion & incresed effort. Pt amb w RW x 6, 14, and 20 feet w stiff legged shuffling gait. Pt performed bed and toilet transfer w BUE push up w SBA. Explained to pt that was doing so to make sure that she was able to transfer from bed and toilet. Pt and daughter education on ice application, not her usual heat, for 3 days and that she might feel a little stiffer than usual in the morning. Informed pt and daughter of probable DC on 11/16 w PFA at next visit. Pt and daughter educated on a majority of in the home falls from hurrying to the bathroom, need to go slower no matter the reason, and to think of switching to protective undergarments. CAREGIVER INVOLVEMENT/ASSISTANCE NEEDED FOR: 
Pt's daughter and renter upstairs assists w ADLs, medications, meals, and transportation. HOME HEALTH SUPPLIES by type and quantity ordered/delivered this visit include: None ASSESSMENT AND PROGRESS TOWARD GOALS:   
Progressing fair toward goals for gait, transfers, balance and strength as she was able to amb > 40 feet total w 3 transfers after falling while rushing to the bathroom earlier that day. Patient continues to have deficits in gait, transfers, balance and strength due to age related OA bilateral knee and poor hip girdle & trunk strength.    
Patient will benefit from continued intervention with progression of all PT goals to improve functional independence, prevent falls, decrease burden of care, and improve quality of life. CONTINUED NEED FOR THE FOLLOWING SKILLS: 
HH PT is medically necessary to  address pain, decreased ROM of BLE, decreased strength, decreased independence with functional transfers, impaired gait, impaired stair negotiation, and impaired balance in order to improve functional independence, quality of life, return to PLOF, and reduce the risk for falls. PLAN FOR NEXT VISIT: 
Post fall assessment DISCHARGE PLANNING was discussed with the pt/caregiver:  
Frequency: 2wk4, 1wk1, w 2wk1, 1wk1 remaining with anticipated DC on 11/15.

## 2021-11-05 PROCEDURE — 3331090001 HH PPS REVENUE CREDIT

## 2021-11-05 PROCEDURE — 3331090002 HH PPS REVENUE DEBIT

## 2021-11-06 PROCEDURE — 3331090001 HH PPS REVENUE CREDIT

## 2021-11-06 PROCEDURE — 3331090002 HH PPS REVENUE DEBIT

## 2021-11-07 VITALS
HEART RATE: 81 BPM | OXYGEN SATURATION: 94 % | TEMPERATURE: 97.7 F | DIASTOLIC BLOOD PRESSURE: 78 MMHG | SYSTOLIC BLOOD PRESSURE: 138 MMHG

## 2021-11-07 PROCEDURE — 3331090002 HH PPS REVENUE DEBIT

## 2021-11-07 PROCEDURE — 3331090001 HH PPS REVENUE CREDIT

## 2021-11-07 NOTE — CASE COMMUNICATION
Pt reports falling on 11/4. She ate something, she says, that upset her stomach and she had diarrhea. She states that she had gone once, but then had to go a second time, but she fell in the bathroom while hurrying to the toilet. Her daughter informed therapist that the man who rents upstairs said that he heard her fall, went to see about her, and found her on the floor w BLEs covered in feces. Pt states that she is only bruised on  her left forearm, ALEXANDER shins,  and her neck & left knee hurt a little. Pt to have Post Fall Assessment on 11/9. Pt denies hitting her head or LOC.

## 2021-11-08 PROCEDURE — 3331090002 HH PPS REVENUE DEBIT

## 2021-11-08 PROCEDURE — 3331090001 HH PPS REVENUE CREDIT

## 2021-11-09 ENCOUNTER — HOME CARE VISIT (OUTPATIENT)
Dept: SCHEDULING | Facility: HOME HEALTH | Age: 86
End: 2021-11-09
Payer: MEDICARE

## 2021-11-09 PROCEDURE — 3331090002 HH PPS REVENUE DEBIT

## 2021-11-09 PROCEDURE — G0151 HHCP-SERV OF PT,EA 15 MIN: HCPCS

## 2021-11-09 PROCEDURE — 3331090001 HH PPS REVENUE CREDIT

## 2021-11-10 PROCEDURE — 3331090001 HH PPS REVENUE CREDIT

## 2021-11-10 PROCEDURE — 3331090002 HH PPS REVENUE DEBIT

## 2021-11-10 NOTE — HOME HEALTH
POST FALL:   See Fall Tracking for details. S:  Patient denies any other falls. She reports L > R knee pain with activity (walking)  O:  See interventions and sections. A:  Patient continues to be at [facility] for falls, has a devcline in Tinetti balance test primarily in the balance portion of the test.  Her static standing balance shows greatest decline, possibly due to her B knee pain. Strength is improving per MMT, but functional strength to stand from a chair has also declined, as she required multiple attempts to stand from her chair today vs a single attempt 11/2. Overall, due to patient lives alone, is at a high fall risk, she will need further HHPT to address deficits and improve safety.     Plan:  Contact MD to request more visits, has 2 left,  PT will try 2w3, 1w1 (6 more visits)

## 2021-11-11 ENCOUNTER — HOME CARE VISIT (OUTPATIENT)
Dept: HOME HEALTH SERVICES | Facility: HOME HEALTH | Age: 86
End: 2021-11-11
Payer: MEDICARE

## 2021-11-11 ENCOUNTER — HOME CARE VISIT (OUTPATIENT)
Dept: SCHEDULING | Facility: HOME HEALTH | Age: 86
End: 2021-11-11
Payer: MEDICARE

## 2021-11-11 VITALS — TEMPERATURE: 97.7 F | SYSTOLIC BLOOD PRESSURE: 130 MMHG | DIASTOLIC BLOOD PRESSURE: 78 MMHG | HEART RATE: 88 BPM

## 2021-11-11 VITALS
DIASTOLIC BLOOD PRESSURE: 72 MMHG | SYSTOLIC BLOOD PRESSURE: 119 MMHG | OXYGEN SATURATION: 95 % | HEART RATE: 86 BPM | TEMPERATURE: 97.7 F

## 2021-11-11 PROCEDURE — 3331090001 HH PPS REVENUE CREDIT

## 2021-11-11 PROCEDURE — 3331090002 HH PPS REVENUE DEBIT

## 2021-11-11 PROCEDURE — G0157 HHC PT ASSISTANT EA 15: HCPCS

## 2021-11-11 NOTE — HOME HEALTH
DOCTOR'S VISIT:  RAKESH as PCP makes housecalls    SUBJECTIVE:  Pt reports that she went out to the Audiologist today to see if she needs new hearing aides, but she did not, and that her left knee is aching now. \"It's been a long day. \"    NARRATIVE:  Arrived to find pt furniture walking wo AD w stooped, antalgic, and shuffling gait in sun room, so gave pt her RW to walk back to her bedroom x apx 35 feet w SBA. Pt educated on the need to do seated ther ex to help improve the OA in ELÍAS knees L>R. Instructed in, gave handout for sitting core stability & dynamic sitting balance, and pt performed: forward<>backward and side<>side lean w arms across chest w return to upright and 1 sec hold x 10 ea; Elías trunk rotations x 10ea;  and across to body rotate and reach w BUE x 10ea w freq MC/VC due to poor ROM. CAREGIVER INVOLVEMENT/ASSISTANCE NEEDED FOR:  Pt's daughter and renter upstairs assists w ADLs, medications, meals, and transportation. HOME HEALTH SUPPLIES by type and quantity ordered/delivered this visit include: None    ASSESSMENT AND PROGRESS TOWARD GOALS:    Progressing fair toward goals for gait, transfers, balance and strength as she was able to perform well seated dynamic balance and core strengthening ex wo c/o LBP. Patient continues to have deficits in gait, transfers, balance and strength due to age related OA bilateral knee and poor hip girdle & trunk strength. Patient will benefit from continued intervention with progression of all PT goals to improve functional independence, prevent falls, decrease burden of care, and improve quality of life.     CONTINUED NEED FOR THE FOLLOWING SKILLS:  HH PT is medically necessary to  address pain, decreased ROM of BLE, decreased strength, decreased independence with functional transfers, impaired gait, impaired stair negotiation, and impaired balance in order to improve functional independence, quality of life, return to PLOF, and reduce the risk for falls.    PLAN FOR NEXT VISIT:  Cont w seated core strengthening. DISCHARGE PLANNING was discussed with the pt/caregiver:   Frequency: 2wk4, 1wk1, w 1wk1 remaining with anticipated DC on 11/15. Extension requested for 2wk3, 1wk1.

## 2021-11-12 PROCEDURE — 3331090001 HH PPS REVENUE CREDIT

## 2021-11-12 PROCEDURE — 3331090002 HH PPS REVENUE DEBIT

## 2021-11-13 PROCEDURE — 3331090001 HH PPS REVENUE CREDIT

## 2021-11-13 PROCEDURE — 3331090002 HH PPS REVENUE DEBIT

## 2021-11-14 PROCEDURE — 3331090001 HH PPS REVENUE CREDIT

## 2021-11-14 PROCEDURE — 3331090002 HH PPS REVENUE DEBIT

## 2021-11-14 PROCEDURE — 400018 HH-NO PAY CLAIM PROCEDURE

## 2021-11-15 PROCEDURE — 3331090001 HH PPS REVENUE CREDIT

## 2021-11-15 PROCEDURE — 3331090002 HH PPS REVENUE DEBIT

## 2021-11-16 ENCOUNTER — HOME CARE VISIT (OUTPATIENT)
Dept: SCHEDULING | Facility: HOME HEALTH | Age: 86
End: 2021-11-16
Payer: MEDICARE

## 2021-11-16 VITALS
TEMPERATURE: 97.7 F | DIASTOLIC BLOOD PRESSURE: 63 MMHG | OXYGEN SATURATION: 92 % | HEART RATE: 79 BPM | SYSTOLIC BLOOD PRESSURE: 125 MMHG

## 2021-11-16 PROCEDURE — 3331090002 HH PPS REVENUE DEBIT

## 2021-11-16 PROCEDURE — 400013 HH SOC

## 2021-11-16 PROCEDURE — G0157 HHC PT ASSISTANT EA 15: HCPCS

## 2021-11-16 PROCEDURE — 3331090001 HH PPS REVENUE CREDIT

## 2021-11-16 NOTE — HOME HEALTH
DOCTOR'S VISIT:  TBD    SUBJECTIVE:  Pt reports that she has had a busy day and is tired. Pt states that her trip to [place], where she had her 89th birthday party, went well, but she came home a went straight to bed bc she was so tired. NARRATIVE:  Pt opened the door for therapist and amb back w RW w cont antalgic, kyphotic gait w Mod I. Pt ed again on the need to exercise daily to improve BLE stregth and ROM for transfers and stairs. Pt pedaled x 5 min on floor pedaler w c/o min left knee pain after pedaling. Instructed in and pt performed on green therafoam for static balance and core staility: w feet 6, 4 & 2 apart and together wo UE support x 30 sec each w min increased postural sway; pt needed seated rest break x 2. Upon sitting SPO2 94% 75BPM.  Pt reports min increase in left knee pain w balance ex that resolved w supine in recliner. Pt looked very sleepy, and stated that she was going to take a nap as soon as therapist left. CAREGIVER INVOLVEMENT/ASSISTANCE NEEDED FOR:  Pt's daughter and renter upstairs assists w ADLs, medications, meals, and transportation. HOME HEALTH SUPPLIES by type and quantity ordered/delivered this visit include: None    ASSESSMENT AND PROGRESS TOWARD GOALS:    Progressing fair toward goals for gait, transfers, balance and strength as she was able to progress w static balance on challenging therafoam from feet 6 inches apart to feet together wo LOB. Patient continues to have deficits in gait, transfers, balance and strength due to age related OA bilateral knee and poor hip girdle & trunk strength. Patient will benefit from continued intervention with progression of all PT goals to improve functional independence, prevent falls, decrease burden of care, and improve quality of life.     CONTINUED NEED FOR THE FOLLOWING SKILLS:  HH PT is medically necessary to  address pain, decreased ROM of BLE, decreased strength, decreased independence with functional transfers, impaired gait, impaired stair negotiation, and impaired balance in order to improve functional independence, quality of life, return to PLOF, and reduce the risk for falls. PLAN FOR NEXT VISIT:  Awaitng extension from PCP    DISCHARGE PLANNING was discussed with the pt/caregiver:   Frequency: 2wk4, 1wk1 with anticipated DC on 11/15. Extension requested for 2wk3, 1wk1, but has not been approved.

## 2021-11-17 ENCOUNTER — HOME CARE VISIT (OUTPATIENT)
Dept: HOME HEALTH SERVICES | Facility: HOME HEALTH | Age: 86
End: 2021-11-17
Payer: MEDICARE

## 2021-11-17 PROCEDURE — 3331090002 HH PPS REVENUE DEBIT

## 2021-11-17 PROCEDURE — 3331090001 HH PPS REVENUE CREDIT

## 2021-11-17 NOTE — CASE COMMUNICATION
Called Dr Roula Hernadez office and was unable to leave message as was on hold 15 min, so gave up. Unsure of request for extension that was requested last week. Not sure what to do next.

## 2021-11-17 NOTE — CASE COMMUNICATION
Informed pt's daughter that I had been on hold for more than 20 minutes to find out why the request for extension of HH PT had not been responded to as it was FAXd last week, but was unable to get through to leave a message or speak with anyone. Daughter acknowledged and did not seem upset that there would be no HH PT tomorrow.

## 2021-11-18 ENCOUNTER — HOME CARE VISIT (OUTPATIENT)
Dept: HOME HEALTH SERVICES | Facility: HOME HEALTH | Age: 86
End: 2021-11-18
Payer: MEDICARE

## 2021-11-18 PROCEDURE — 3331090001 HH PPS REVENUE CREDIT

## 2021-11-18 PROCEDURE — 3331090002 HH PPS REVENUE DEBIT

## 2021-11-18 NOTE — Clinical Note
Thanks, I got the orders last week, it was easy for some reason. ----- Message -----  From: Alfonza Bamberger, PTA  Sent: 11/18/2021   4:06 PM EST  To: Michaela Cuevas, PT      I had the pt sign the [facility] today and dated it for DC 11/26 to give you time to get orders for the final visit. I attached it to my final note from 11/16. I explained to the pt what had happened and she did not seem concerned.

## 2021-11-18 NOTE — Clinical Note
I was istaken about getting the order. I goth through and left message, no answer back. I then faxed the request.  I thought we were planning to d/c since she did not want any ore therapy? I only requested 1w1 for d/c.    ----- Message -----  From: Dhaval Ambrosio PTA  Sent: 11/23/2021   4:47 PM EST  To: Mich Pedro PT  Subject: RE:                                                Should I put her on my schedule for next week.  ----- Message -----  From: Frances Gagnon PT  Sent: 11/21/2021  11:28 AM EST  To: Jazmyn Fisher PTA  Subject: RE:                                                Thanks, I got the orders last week, it was easy for some reason. ----- Message -----  From: Dhaval Ambrosio PTA  Sent: 11/18/2021   4:06 PM EST  To: Mich Pedro PT      I had the pt sign the [facility] today and dated it for DC 11/26 to give you time to get orders for the final visit. I attached it to my final note from 11/16. I explained to the pt what had happened and she did not seem concerned.

## 2021-11-18 NOTE — Clinical Note
I think 1wk1 is best.  Neither daughter or pt seemed interested in cont.    ----- Message -----  From: Neelam Owusu PT  Sent: 11/24/2021  10:23 PM EST  To: Lakia Gallardo PTA  Subject: RE:                                                I was istaken about getting the order. I goth through and left message, no answer back. I then faxed the request.  I thought we were planning to d/c since she did not want any ore therapy? I only requested 1w1 for d/c.    ----- Message -----  From: Rosey Silva PTA  Sent: 11/23/2021   4:47 PM EST  To: Dalia Wasserman PT  Subject: RE:                                                Should I put her on my schedule for next week.  ----- Message -----  From: Neelam Owusu PT  Sent: 11/21/2021  11:28 AM EST  To: Lakia Gallardo PTA  Subject: RE:                                                Thanks, I got the orders last week, it was easy for some reason. ----- Message -----  From: Rosey Silva PTA  Sent: 11/18/2021   4:06 PM EST  To: Dalia Wasserman, PT      I had the pt sign the [facility] today and dated it for DC 11/26 to give you time to get orders for the final visit. I attached it to my final note from 11/16. I explained to the pt what had happened and she did not seem concerned.

## 2021-11-18 NOTE — Clinical Note
I did not hear back, they were supposed to fax, and so I sent a fax last week, still have not heard back. I'll check with Sommer Odell to see of a fax  in, if she is not too busy.  ----- Message -----  From: Rosey Silva PTA  Sent: 2021  11:43 AM EST  To: Dalia Wasserman PT  Subject: RE:                                                I think 1wk1 is best.  Neither daughter or pt seemed interested in cont.    ----- Message -----  From: Neelam Owusu PT  Sent: 2021  10:23 PM EST  To: Lakia Gallardo PTA  Subject: RE:                                                I was istaken about getting the order. I goth through and left message, no answer back. I then faxed the request.  I thought we were planning to d/c since she did not want any ore therapy? I only requested 1w1 for d/c.    ----- Message -----  From: Rosey Silva PTA  Sent: 2021   4:47 PM EST  To: Dalia Wasserman PT  Subject: RE:                                                Should I put her on my schedule for next week.  ----- Message -----  From: Neelam Owusu PT  Sent: 2021  11:28 AM EST  To: Lakia Gallardo PTA  Subject: RE:                                                Thanks, I got the orders last week, it was easy for some reason. ----- Message -----  From: Rosey Silva PTA  Sent: 2021   4:06 PM EST  To: Dalia Wasserman PT      I had the pt sign the [facility] HE[facility] today and dated it for DC  to give you time to get orders for the final visit. I attached it to my final note from . I explained to the pt what had happened and she did not seem concerned.

## 2021-11-18 NOTE — Clinical Note
Should I put her on my schedule for next week.  ----- Message -----  From: Leopoldo Sins, PT  Sent: 11/21/2021  11:28 AM EST  To: Melissa Jamil PTA  Subject: RE:                                                Thanks, I got the orders last week, it was easy for some reason. ----- Message -----  From: Nguyễn Wesley PTA  Sent: 11/18/2021   4:06 PM EST  To: Hollie Bermudez PT      [facility] and dated it for DC 11/26 to give you time to get orders for the final visit. I attached it to my final note from 11/16. I explained to the pt what had happened and she did not seem concerned.

## 2021-11-18 NOTE — CASE COMMUNICATION
I had the pt sign the Count includes the [facility] today and dated it for DC 11/26 to give you time to get orders for the final visit. I attached it to my final note from 11/16. I explained to the pt what had happened and she did not seem concerned.

## 2021-11-18 NOTE — CASE COMMUNICATION
Unable to reach Dr Eli Benitez office to inquire about extension. Informed pt's daughter of the issue and she did not seem concerned. I have gotten the impression from the pt that she isn't doing her HEP as she only knows the ankle pumps. I have not given her the [facility] HEART as was expecting extension. What should we do?

## 2021-11-19 PROCEDURE — 3331090002 HH PPS REVENUE DEBIT

## 2021-11-19 PROCEDURE — 3331090001 HH PPS REVENUE CREDIT

## 2021-11-20 PROCEDURE — 3331090001 HH PPS REVENUE CREDIT

## 2021-11-20 PROCEDURE — 3331090002 HH PPS REVENUE DEBIT

## 2021-11-21 PROCEDURE — 3331090002 HH PPS REVENUE DEBIT

## 2021-11-21 PROCEDURE — 3331090001 HH PPS REVENUE CREDIT

## 2021-11-22 PROCEDURE — 3331090001 HH PPS REVENUE CREDIT

## 2021-11-22 PROCEDURE — 3331090002 HH PPS REVENUE DEBIT

## 2021-11-23 ENCOUNTER — HOME CARE VISIT (OUTPATIENT)
Dept: HOME HEALTH SERVICES | Facility: HOME HEALTH | Age: 86
End: 2021-11-23
Payer: MEDICARE

## 2021-11-23 PROCEDURE — 3331090002 HH PPS REVENUE DEBIT

## 2021-11-23 PROCEDURE — 3331090001 HH PPS REVENUE CREDIT

## 2021-11-24 PROCEDURE — 3331090002 HH PPS REVENUE DEBIT

## 2021-11-24 PROCEDURE — 3331090001 HH PPS REVENUE CREDIT

## 2021-11-25 PROCEDURE — 3331090001 HH PPS REVENUE CREDIT

## 2021-11-25 PROCEDURE — 3331090002 HH PPS REVENUE DEBIT

## 2021-11-26 ENCOUNTER — HOSPITAL ENCOUNTER (EMERGENCY)
Age: 86
Discharge: HOME | End: 2021-11-26
Attending: STUDENT IN AN ORGANIZED HEALTH CARE EDUCATION/TRAINING PROGRAM
Payer: MEDICARE

## 2021-11-26 ENCOUNTER — APPOINTMENT (OUTPATIENT)
Dept: GENERAL RADIOLOGY | Age: 86
End: 2021-11-26
Attending: STUDENT IN AN ORGANIZED HEALTH CARE EDUCATION/TRAINING PROGRAM
Payer: MEDICARE

## 2021-11-26 ENCOUNTER — APPOINTMENT (OUTPATIENT)
Dept: CT IMAGING | Age: 86
End: 2021-11-26
Attending: STUDENT IN AN ORGANIZED HEALTH CARE EDUCATION/TRAINING PROGRAM
Payer: MEDICARE

## 2021-11-26 VITALS
HEIGHT: 68 IN | DIASTOLIC BLOOD PRESSURE: 72 MMHG | TEMPERATURE: 98.1 F | OXYGEN SATURATION: 95 % | WEIGHT: 186.2 LBS | HEART RATE: 89 BPM | RESPIRATION RATE: 16 BRPM | BODY MASS INDEX: 28.22 KG/M2 | SYSTOLIC BLOOD PRESSURE: 161 MMHG
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
## 2021-11-26 DIAGNOSIS — T14.8XXA AVULSION OF SKIN: ICD-10-CM

## 2021-11-26 LAB
ANION GAP SERPL CALC-SCNC: 4 MMOL/L (ref 3–18)
APPEARANCE UR: ABNORMAL
BACTERIA URNS QL MICRO: ABNORMAL /HPF
BASOPHILS # BLD: 0 K/UL (ref 0–0.1)
BASOPHILS NFR BLD: 0 % (ref 0–2)
BILIRUB UR QL: NEGATIVE
BUN SERPL-MCNC: 20 MG/DL (ref 7–18)
BUN/CREAT SERPL: 26 (ref 12–20)
CALCIUM SERPL-MCNC: 9.3 MG/DL (ref 8.5–10.1)
CHLORIDE SERPL-SCNC: 109 MMOL/L (ref 100–111)
CO2 SERPL-SCNC: 29 MMOL/L (ref 21–32)
COLOR UR: YELLOW
CREAT SERPL-MCNC: 0.76 MG/DL (ref 0.6–1.3)
DIFFERENTIAL METHOD BLD: ABNORMAL
EOSINOPHIL # BLD: 0 K/UL (ref 0–0.4)
EOSINOPHIL NFR BLD: 0 % (ref 0–5)
EPITH CASTS URNS QL MICRO: ABNORMAL /LPF (ref 0–5)
ERYTHROCYTE [DISTWIDTH] IN BLOOD BY AUTOMATED COUNT: 13.1 % (ref 11.6–14.5)
GLUCOSE SERPL-MCNC: 92 MG/DL (ref 74–99)
GLUCOSE UR STRIP.AUTO-MCNC: NEGATIVE MG/DL
HCT VFR BLD AUTO: 40.9 % (ref 35–45)
HGB BLD-MCNC: 13.3 G/DL (ref 12–16)
HGB UR QL STRIP: ABNORMAL
IMM GRANULOCYTES # BLD AUTO: 0.1 K/UL (ref 0–0.04)
IMM GRANULOCYTES NFR BLD AUTO: 1 % (ref 0–0.5)
KETONES UR QL STRIP.AUTO: NEGATIVE MG/DL
LEUKOCYTE ESTERASE UR QL STRIP.AUTO: ABNORMAL
LYMPHOCYTES # BLD: 1.5 K/UL (ref 0.9–3.6)
LYMPHOCYTES NFR BLD: 13 % (ref 21–52)
MCH RBC QN AUTO: 31.1 PG (ref 24–34)
MCHC RBC AUTO-ENTMCNC: 32.5 G/DL (ref 31–37)
MCV RBC AUTO: 95.8 FL (ref 78–100)
MONOCYTES # BLD: 0.9 K/UL (ref 0.05–1.2)
MONOCYTES NFR BLD: 7 % (ref 3–10)
NEUTS SEG # BLD: 9.2 K/UL (ref 1.8–8)
NEUTS SEG NFR BLD: 78 % (ref 40–73)
NITRITE UR QL STRIP.AUTO: NEGATIVE
NRBC # BLD: 0 K/UL (ref 0–0.01)
NRBC BLD-RTO: 0 PER 100 WBC
PH UR STRIP: 6.5 [PH] (ref 5–8)
PLATELET # BLD AUTO: 265 K/UL (ref 135–420)
PMV BLD AUTO: 9.4 FL (ref 9.2–11.8)
POTASSIUM SERPL-SCNC: 3.9 MMOL/L (ref 3.5–5.5)
PROT UR STRIP-MCNC: ABNORMAL MG/DL
RBC # BLD AUTO: 4.27 M/UL (ref 4.2–5.3)
RBC #/AREA URNS HPF: ABNORMAL /HPF (ref 0–5)
SODIUM SERPL-SCNC: 142 MMOL/L (ref 136–145)
SP GR UR REFRACTOMETRY: 1.02 (ref 1–1.03)
TROPONIN I SERPL-MCNC: 0.02 NG/ML (ref 0–0.04)
UROBILINOGEN UR QL STRIP.AUTO: 1 EU/DL (ref 0.2–1)
WBC # BLD AUTO: 11.7 K/UL (ref 4.6–13.2)
WBC URNS QL MICRO: ABNORMAL /HPF (ref 0–5)

## 2021-11-26 PROCEDURE — 99284 EMERGENCY DEPT VISIT MOD MDM: CPT

## 2021-11-26 PROCEDURE — 85025 COMPLETE CBC W/AUTO DIFF WBC: CPT

## 2021-11-26 PROCEDURE — 70450 CT HEAD/BRAIN W/O DYE: CPT

## 2021-11-26 PROCEDURE — 84484 ASSAY OF TROPONIN QUANT: CPT

## 2021-11-26 PROCEDURE — 72125 CT NECK SPINE W/O DYE: CPT

## 2021-11-26 PROCEDURE — 81001 URINALYSIS AUTO W/SCOPE: CPT

## 2021-11-26 PROCEDURE — 73590 X-RAY EXAM OF LOWER LEG: CPT

## 2021-11-26 PROCEDURE — 3331090001 HH PPS REVENUE CREDIT

## 2021-11-26 PROCEDURE — 73521 X-RAY EXAM HIPS BI 2 VIEWS: CPT

## 2021-11-26 PROCEDURE — 71045 X-RAY EXAM CHEST 1 VIEW: CPT

## 2021-11-26 PROCEDURE — 3331090002 HH PPS REVENUE DEBIT

## 2021-11-26 PROCEDURE — 90471 IMMUNIZATION ADMIN: CPT

## 2021-11-26 PROCEDURE — 90715 TDAP VACCINE 7 YRS/> IM: CPT | Performed by: STUDENT IN AN ORGANIZED HEALTH CARE EDUCATION/TRAINING PROGRAM

## 2021-11-26 PROCEDURE — 93005 ELECTROCARDIOGRAM TRACING: CPT

## 2021-11-26 PROCEDURE — 80048 BASIC METABOLIC PNL TOTAL CA: CPT

## 2021-11-26 PROCEDURE — 74011250636 HC RX REV CODE- 250/636: Performed by: STUDENT IN AN ORGANIZED HEALTH CARE EDUCATION/TRAINING PROGRAM

## 2021-11-26 RX ORDER — ACETAMINOPHEN 500 MG
1000 TABLET ORAL ONCE
Status: DISCONTINUED | OUTPATIENT
Start: 2021-11-26 | End: 2021-11-26 | Stop reason: HOSPADM

## 2021-11-26 RX ADMIN — TETANUS TOXOID, REDUCED DIPHTHERIA TOXOID AND ACELLULAR PERTUSSIS VACCINE, ADSORBED 0.5 ML: 5; 2.5; 8; 8; 2.5 SUSPENSION INTRAMUSCULAR at 12:37

## 2021-11-26 NOTE — ED TRIAGE NOTES
Per medic: Daughter called stating that patient tripped and fell. Upon EMS arrival patient was noted to be at normal mentation.  Avulsion noted to lower left leg

## 2021-11-26 NOTE — ED NOTES
EMERGENCY DEPARTMENT HISTORY AND PHYSICAL EXAM      Patient Name: Madelyn Bauer    History of Presenting Illness     HPI:     51-year-old female with a history of HTN, HLD, presents to the ED via EMS for evaluation of left leg injury after an unwitnessed fall just prior to arrival.  Patient was hanging clothes in the closet and subsequently felt off balance briefly and fell to the ground striking her left leg against an object. She denies head injury or LOC however does not know why she got dizzy. Denies tripping on anything. Currently is complaining of some left leg pain however denies any blurry or double vision, headache, nausea, vomiting, chest pain, lightheadedness, presyncope. PCP: Renard Homans, MD    No current facility-administered medications on file prior to encounter. Current Outpatient Medications on File Prior to Encounter   Medication Sig Dispense Refill    mv-mn/folic ac/calcium/vit K1 (WOMEN'S 50 PLUS MULTIVITAMIN PO) Take 1 Tablet by mouth daily.  artificial tears,hypromellose, (Systane GeL) 0.3 % gel ophthalmic ointment Apply 1 Drop to eye four (4) times daily as needed for Other (irritation). apply 1 drop  over left eye q 4 hours prn irritation      calcium-cholecalciferol, d3, (Calcium 600 + D,3,) 600 mg calcium- 200 unit cap Take 600 mg by mouth daily.  losartan (COZAAR) 50 mg tablet Take 50 mg by mouth daily.  methocarbamol (ROBAXIN) 500 mg tablet Take 500 mg by mouth daily. take at bedtime  Pt reports only taking it one time due to med causing dizziness; pt to discuss it w/ MD.  Indications: muscle spasm      calcitonin, salmon, (MIACALCIN) nasal 1 spray intranasal daily, Alternate nostrils. 1 Bottle 0    pantoprazole (PROTONIX) 20 mg tablet Take 20 mg by mouth daily. Indications: gastroesophageal reflux disease      ibuprofen (MOTRIN) 400 mg tablet Take 400 mg by mouth three (3) times daily.       allopurinol (ZYLOPRIM) 100 mg tablet Take 100 mg by mouth daily.  colchicine 0.6 mg tablet Take 0.6 mg by mouth daily.  nitrofurantoin (MACRODANTIN) 50 mg capsule Take 1 Cap by mouth every six (6) hours. (Patient not taking: Reported on 2/11/2020) 6 Cap 0    acetaminophen (TYLENOL) 325 mg tablet Take 2 Tabs by mouth every four (4) hours as needed for Pain. (Patient taking differently: Take 500 mg by mouth three (3) times daily.) 60 Tab 0    diclofenac (VOLTAREN) 1 % gel Apply 4 g to affected area four (4) times daily as needed for Pain. Indications: Osteoarthritis of the Knee 100 g 0    atorvastatin (LIPITOR) 20 mg tablet Take 20 mg by mouth Q TU, TH & SAT.  aspirin delayed-release 81 mg tablet Take 81 mg by mouth daily.  raloxifene (EVISTA) 60 mg tablet Take 60 mg by mouth daily.  losartan-hydrochlorothiazide (HYZAAR) 50-12.5 mg per tablet Take 1 Tab by mouth daily. Past History     Past Medical History:  Past Medical History:   Diagnosis Date    Essential hypertension     Hyperlipidemia     Other premature beats 5/13/2013     3.  11,437 single ve's, 16 paired. Bigeminy and trigeminy noted. Approximately 15% o pvc's on ekg. asymptomatic potassium normal check echo for lv function        Past Surgical History:  No past surgical history on file. Family History:  Family History   Problem Relation Age of Onset    Arrhythmia Sister     Heart Attack Other     Heart Surgery Other        Social History:  Social History     Tobacco Use    Smoking status: Never Smoker    Smokeless tobacco: Not on file   Substance Use Topics    Alcohol use: No    Drug use: No       Allergies:  No Known Allergies    Review of Nursing Notes: I have reviewed the relevant nursing notes that were available at the time of this entry.  Portions of the Family and Social history, as well as medications and allergies, may have been entered into my documentation by nursing or other ancillary staff; I have confirmed and may have supplemented that information to the best of my ability at the time the note was reviewed. There are some disagreements between the nursing notes and my evaluation at times. Some of the above referenced nursing documentation appears in my note after completion of my review. Review of Systems     CONSTITUTIONAL: Denies fevers, chills, sweats, or weight changes. EYES: Denies any visual changes or symptoms  HENT: Denies headaches, changes to hearing, rhinitis, sore throat, dysphagia, or change in voice. CV: Denies chest pains or palpitations. Lungs/Chest: Denies dyspnea, wheezing, or cough. GI: Denies nausea, vomiting, diarrhea, constipation, abdominal pain, hematochezia, or melena. : Denies urinary retention or incontinence. No genital discharge. No dysuria. MSK: Denies myalgias. NEURO: Denies chronic headaches or seizures. No numbness, tingling or weakness. PSYCHIATRIC: Denies problems with mood disturbance and anxiety. DERMATOLOGIC: Denies any rashes. Physical Exam     General: In no apparent distress. Well-nourished/well-developed. Head/Neck: Normocephalic, atraumatic. Nontender midline neck, normal ROM. EENT: PERRLA. EOM intact bilaterally. Oropharyngeal mucosa is moist, lesions or erythema. No nasal discharge. Cardiovascular: RRR, no murmurs, gallops, or rubs. Peripheral pulses normal and intact in BUE and BLE. Lungs/Chest: CTAB, no wheezing, rhonchi, or rales. Abdomen: No distention. No organomegaly. No rebound, rigidity, or guarding. Nontender. Extremities/Skin: Warm distal extremities No deformities. No edema. No rashes. Rather large 5 x 3 cm skin avulsion overlying the anterior aspect of the left distal tibia with subcutaneous tissue exposed, no deep laceration noted. Superficial skin flap partially intact. Neuro: A&O x 3. Grossly intact sensations and motor function in upper and lower extremities bilaterally. Psych: Appropriate mood and affect.     Diagnostic Study Results     Labs -     Recent Results (from the past 12 hour(s))   EKG, 12 LEAD, INITIAL    Collection Time: 11/26/21 11:24 AM   Result Value Ref Range    Ventricular Rate 73 BPM    Atrial Rate 73 BPM    P-R Interval 182 ms    QRS Duration 72 ms    Q-T Interval 370 ms    QTC Calculation (Bezet) 407 ms    Calculated P Axis 54 degrees    Calculated R Axis 56 degrees    Calculated T Axis 29 degrees    Diagnosis       Sinus rhythm with premature atrial complexes  Otherwise normal ECG  When compared with ECG of 03-APR-2019 07:09,  premature ventricular complexes are no longer present  premature atrial complexes are now present         Radiologic Studies -   CT HEAD WO CONT   Final Result                  1.  No acute intracranial abnormalities. 2.  Slight chronic microvascular ischemic changes. 3.  No significant interval change. CT SPINE CERV WO CONT   Final Result             1.  No acute fracture or subluxation. 2.  Degenerative changes in the cervical spine. XR CHEST PORT   Final Result   No acute cardiopulmonary findings. XR HIPS BI W OR WO AP PELV   Final Result   1. No acute osseous findings. XR TIB/FIB LT   Final Result   No acute osseous findings. CT Results  (Last 48 hours)               11/26/21 1316  CT HEAD WO CONT Final result    Impression:                 1.  No acute intracranial abnormalities. 2.  Slight chronic microvascular ischemic changes. 3.  No significant interval change. Narrative:  EXAM:  CT Head without Contrast                CLINICAL INDICATION:  Trauma. Fall. COMPARISON:  12/12/15. TECHNIQUE:         - Helical volumetric CT imaging of the head is performed from the base of the   skull to the vertex without IV contrast administration. Axial, coronal and   sagittal images are generated from the volumetric data set.              - Dose optimization techniques are utilized as appropriate to the performed exam   with combination of automated exposure control, adjustment of mA and/or kV   according to patient size, and use of iterative reconstructive technique. FINDINGS:        Brain:       - Hemorrhage/ hematoma:  No acute intracranial hemorrhage/ hematoma. - Mass, mass effect:  None. - Gray-white matter differentiation:  Minimal white matter hypodensities,   suggestive of chronic microvascular ischemia.   - Interval assessment:  No significant interval change. CSF Spaces:  No evidence of abnormal effacement or enlargement. Calvarium:  Intact. Sinuses, Mastoids:  Clear. 11/26/21 1316  CT SPINE CERV WO CONT Final result    Impression:            1. No acute fracture or subluxation. 2.  Degenerative changes in the cervical spine. Narrative:  EXAM:  CT Cervical Spine with Contrast       CLINICAL INDICATION:  Tripped and fell. ?          COMPARISON:  None. TECHNIQUE:         - Helical volumetric CT imaging of the cervical spine is performed without IV   contrast.  Frome these volumetric source data, coronal and sagittal multiplanar   reconstruction images are generated for improved anatomic delineation along with   the standard axial images for review. - Radiation dose optimization techniques are utilized as appropriate to the   exam, with combination of automated exposure control, adjustment of the mA   and/or kV according to patient's size (Including appropriate matching for   site-specific examinations), or use of iterative reconstruction technique. FINDINGS:        Vertebrae, Disc Levels:       - No evidence of acute fracture or subluxation is detected.      - Pronounced decrease in disc height at C4-5, C5-6 and C7-T1.    - Moderate decrease in disc height at C6-7.   - Minimal to mild decrease in disc height at C3-4.   - Endplate osteophytes at C4-5, C5-6, C6-7 and C7-T1.   - Endplate osteophytes posteriorly at C3-4, C4-5, C5-6 and C6-7.     - Neural foraminal narrowing at multiple levels of the cervical spine, i.e. C4-5   and C5-6 bilaterally and C3-4 on the left side       Soft Tissue       - No acute abnormalities in the prevertebral soft tissue.   - No airspace opacities at the lung apices. CXR Results  (Last 48 hours)               11/26/21 1211  XR CHEST PORT Final result    Impression:  No acute cardiopulmonary findings. Narrative:  INDICATION: Post fall, dizziness       TECHNIQUE: Portable chest radiograph       COMPARISON: None       FINDINGS: Emphysematous changes. No focal consolidation, effusion or   pneumothorax. Heart size within normal limits. Ectatic aorta. No acute osseous abnormality. Osteopenia. Medical Decision Making     I am the first provider for this patient. Vital Signs- I personally reviewed and interpreted the patient's vital signs. Patient Vitals for the past 12 hrs:   Temp Pulse Resp BP SpO2   11/26/21 1129 98.1 °F (36.7 °C) 89 16 (!) 161/72 95 %   11/26/21 1111 97.8 °F (36.6 °C) 74 16 (!) 153/94 94 %     Records Reviewed: I reviewed the patient's records to interpret any previous medical data available to me including EKGs, previous medical records, previous images, previous labs. Provider Notes (Medical Decision Making):     45-year-old female with a history of HTN, HLD presents to the ED for evaluation of left lower leg pain after an unwitnessed fall prior to arrival.  Patient states she was putting away clothes and felt dizzy subsequently felt and struck her leg against something. Does not think she lost consciousness. However she does not know how she fell, states she did not trip over anything. Upon my examination, patient is afebrile and overall well appearing. Hemodynamically normal. Neurovascularly intact.   She does have a rather large skin avulsion to the anterior aspect of her left lower extremity with a partial skin flap intact, is able to copiously irrigate the area and placed the skin flap over some of the exposed subcutaneous tissue. No laceration requiring sutures. The area was dressed with sterile gauze and Kerlix. Given today's clinical picture, my differential diagnoses indlude: Vasovagal syncope, anemia, electrolyte abnormality, arrhythmia, mechanical fall, intracranial hemorrhage, acute osseous injury. To further refine my diagnosis, I will order x-ray of the bilateral hips, x-ray of the left lower extremity, CT brain, CT cervical, labs including cardiac enzymes, EKG, CXR. Will update her tetanus in the ED. ED Course:     ED Course as of 11/26/21 1413   Fri Nov 26, 2021   1413 2:13 PM : Pt care transferred to Dr. Yannick Germain  ,ED provider. History of patient complaint(s), available diagnostic reports and current treatment plan has been discussed thoroughly. Bedside rounding on patient occured : no . Intended disposition of patient : TBD  Pending diagnostics reports and/or labs (please list): follow up on labs and disposition. Dr. Espinoza Rayo assistance in completion of this plan is greatly appreciated but it should be noted that I will be the provider of record for this patient.      [EK]      ED Course User Index  [EK] Sharma Kanner, DO Delilah Mattes, DO  Date: 11/26/2021

## 2021-11-26 NOTE — ED TRIAGE NOTES
Pt denies any pain at this time. Pt denies LOC. Pt states that she lost her balance today while putting away clothes. Pt is AOx4, on RA, and able to speak in complete sentences.

## 2021-11-26 NOTE — ED NOTES
4:27 PM Pt reevaluated at this time. Discussed results and findings, as well as, diagnosis and plan for discharge. Follow up with doctors/services listed. Return to the emergency department for alarming symptoms. Pt verbalizes understanding and agreement with plan. All questions addressed. Negative imaging patient's skin tear was wrapped by Dr. Adelso Calles, discussed with family, patient has been up and ambulatory to the bathroom on 2 occasions so she is doing well with walking. Does want some Tylenol for pain control and discharge follow-up with primary care for the skin tear. UA results noted patient does not have any symptoms of cystitis or dysuria.   Antibiotics not indicated for asymptomatic bacteriuria

## 2021-11-27 LAB
ATRIAL RATE: 73 BPM
CALCULATED P AXIS, ECG09: 54 DEGREES
CALCULATED R AXIS, ECG10: 56 DEGREES
CALCULATED T AXIS, ECG11: 29 DEGREES
DIAGNOSIS, 93000: NORMAL
P-R INTERVAL, ECG05: 182 MS
Q-T INTERVAL, ECG07: 370 MS
QRS DURATION, ECG06: 72 MS
QTC CALCULATION (BEZET), ECG08: 407 MS
VENTRICULAR RATE, ECG03: 73 BPM

## 2021-11-27 PROCEDURE — 3331090001 HH PPS REVENUE CREDIT

## 2021-11-27 PROCEDURE — 3331090002 HH PPS REVENUE DEBIT

## 2021-11-28 PROCEDURE — 3331090002 HH PPS REVENUE DEBIT

## 2021-11-28 PROCEDURE — 3331090001 HH PPS REVENUE CREDIT

## 2021-11-29 PROCEDURE — 3331090002 HH PPS REVENUE DEBIT

## 2021-11-29 PROCEDURE — 3331090001 HH PPS REVENUE CREDIT

## 2021-11-30 PROCEDURE — 3331090002 HH PPS REVENUE DEBIT

## 2021-11-30 PROCEDURE — 3331090001 HH PPS REVENUE CREDIT

## 2021-12-01 ENCOUNTER — HOME CARE VISIT (OUTPATIENT)
Dept: SCHEDULING | Facility: HOME HEALTH | Age: 86
End: 2021-12-01
Payer: MEDICARE

## 2021-12-01 ENCOUNTER — HOME CARE VISIT (OUTPATIENT)
Dept: HOME HEALTH SERVICES | Facility: HOME HEALTH | Age: 86
End: 2021-12-01
Payer: MEDICARE

## 2021-12-01 PROCEDURE — G0151 HHCP-SERV OF PT,EA 15 MIN: HCPCS

## 2021-12-01 PROCEDURE — 3331090002 HH PPS REVENUE DEBIT

## 2021-12-01 PROCEDURE — 3331090001 HH PPS REVENUE CREDIT

## 2021-12-02 ENCOUNTER — HOME CARE VISIT (OUTPATIENT)
Dept: HOME HEALTH SERVICES | Facility: HOME HEALTH | Age: 86
End: 2021-12-02
Payer: MEDICARE

## 2021-12-02 VITALS
OXYGEN SATURATION: 97 % | HEART RATE: 72 BPM | SYSTOLIC BLOOD PRESSURE: 130 MMHG | DIASTOLIC BLOOD PRESSURE: 64 MMHG | TEMPERATURE: 97.4 F

## 2021-12-02 PROCEDURE — 3331090001 HH PPS REVENUE CREDIT

## 2021-12-02 PROCEDURE — 3331090002 HH PPS REVENUE DEBIT

## 2021-12-02 NOTE — CASE COMMUNICATION
As per your prior email on 11/30, you had recieved an extension due to Mrs Albert's 2 falls. I have been looking in her chart for orders w a possible visit for tomorrow, but have not seen anything. Am I starting to treat her this week or next?

## 2021-12-02 NOTE — HOME HEALTH
SUMMARY OF CLINICAL CONDITION: Patient appears to be falling primarily when she transfers. She reports getting her foot caught up in the walker, another when se attmepted a transfer from lift chair and she slid off, and another fall was when she was re-arranginh clothes in her closet and removing some clothes, lost her balance anf fell. She could not remember if she was turning, backing up, but does remember she had leaned forward and loat her balance. Yohan Alford PRIOR MEDICAL HISTORY:    List of Comorbitites:    Arthritis    Essential Hypertension    Unsteady Gait    Impaired ambulation    HTN    Osteoporosis    High cholesterol    Hx. Fall   . CAREGIVER INVOLVEMENT: Daughter and renter assist patient, daughter is primary CG. .    PHYSICAL THERAPY EVALUATION:  GAIT:  Fww:  step through reciprocal gait with even step lengths, flexed posture, feet passing each other when walking on straight paths  STAIRS:  Not assessed today  TRANSFERS:  Ungraded fall into sit, uses lift chair to raise up for transfer to stand. BP elevated after stand to 148/80. Dis not drop as in MD office. Denes lightheaded or dizzy after standing. BALANCE/ FALL RISK:  High fall risk due to 1)  frequent falls, 2). Tinetti score 14/28 indicates a high fall risk. BED MOBILITY:  Indep to SBA for safety due to fall history. STRENGTH:  LLE weakness vs R, pain in L LL limited her ability to participate in MMT.  ROM:  [facility]. SUMMARY:  Extend HHPT to address balance, weight shifts, turns, backing up and transfers to decrease fall risk. MEDICATION REVIEW COMPLETED. Medications reconciled and all medications are available in the home this visit. The following education was provided regarding medications, medication interactions, and look a like medications: Take as directed.   Medications  are effective at this time  [address] by type and quantity ordered/delivered this visit include: n/a  PATIENT EDUCATION PROVIDED: Plan to extend, will address balance and transfers. HOME EXERCISE PROGRAM: Seated reclined:  AP, QS, GS, x 20. Walk every hour for PI prevention. PLAN:  CONTINUED NEED for the following skills: Physical Therapy  Pt/Caregiver instructed on plan of care and are agreeable to plan of care at this time. DISCHARGE PLANNING discussed with patient and caregiver. Discharge planning as follows: PT 2w1, 3w1, plan to re-cert to continue PT for fall prevention. Jacey Fregoso Pt/Caregiver did verbalize understanding.

## 2021-12-03 ENCOUNTER — HOME CARE VISIT (OUTPATIENT)
Dept: SCHEDULING | Facility: HOME HEALTH | Age: 86
End: 2021-12-03
Payer: MEDICARE

## 2021-12-03 VITALS
SYSTOLIC BLOOD PRESSURE: 131 MMHG | DIASTOLIC BLOOD PRESSURE: 75 MMHG | TEMPERATURE: 97.7 F | HEART RATE: 75 BPM | OXYGEN SATURATION: 94 % | RESPIRATION RATE: 18 BRPM

## 2021-12-03 PROCEDURE — G0299 HHS/HOSPICE OF RN EA 15 MIN: HCPCS

## 2021-12-03 PROCEDURE — A6196 ALGINATE DRESSING <=16 SQ IN: HCPCS

## 2021-12-03 PROCEDURE — A9270 NON-COVERED ITEM OR SERVICE: HCPCS

## 2021-12-03 PROCEDURE — 3331090001 HH PPS REVENUE CREDIT

## 2021-12-03 PROCEDURE — A4649 SURGICAL SUPPLIES: HCPCS

## 2021-12-03 PROCEDURE — A6216 NON-STERILE GAUZE<=16 SQ IN: HCPCS

## 2021-12-03 PROCEDURE — G0157 HHC PT ASSISTANT EA 15: HCPCS

## 2021-12-03 PROCEDURE — A6445 CONFORM BAND S W <3"/YD: HCPCS

## 2021-12-03 PROCEDURE — 3331090002 HH PPS REVENUE DEBIT

## 2021-12-03 NOTE — HOME HEALTH
DOCTOR'S VISIT:  PCP makes micky CAMERON    SUBJECTIVE:  Pt reports that her leg looks more swollen than it did yesterday, and that her left knee is hurting. NARRATIVE:  Arrived to find pt in her bedroom w the affected LLE elevated w bandage on anterior aspect of blade of tibia w redness, but no warmth. Took pictures for file of the contrast of LEs for sig more edema in LLE. Informed pt's daughter that the nurse would be arriving today to change dressing. Pt sit to stand x 1 w SBA from elevated recliner due to left knee pain. Pt amb w RW x 6 feet x 2  w 2 turnsw shuffling gait w SBA. Pt performed bed transfer w SBA and c/o left leg pain w feet up. Instructed in, gave handout for, and pt performed Supine HEP for ALEXANDER LE x 10ea ea: SAQ, SLR, Hip ABD/ADD, Heel slides, ankle pumps, and straight leg bridges w freq VC/MC w feet propped up on 2 pillows as knee pain prevented the flexion of usual bridge. Pt reports that all the activity made her tired. CAREGIVER INVOLVEMENT/ASSISTANCE NEEDED FOR:  Pt's daughter and renter upstairs assists w ADLs, medications, meals, and transportation. HOME HEALTH SUPPLIES by type and quantity ordered/delivered this visit include: None    ASSESSMENT AND PROGRESS TOWARD GOALS:    Progressing fair toward goals for gait, transfers, balance and strength as she was able to perform 6 supine ther ex wo sig increased left knee pain. 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 Patient will benefit from continued intervention with progression of all PT goals to improve functional independence, prevent falls, decrease burden of care, and improve quality of life.     CONTINUED NEED FOR THE FOLLOWING SKILLS:  HH PT is medically necessary to  address pain, decreased ROM of BLE, decreased strength, decreased independence with functional transfers, impaired gait, impaired stair negotiation, and impaired balance in order to improve functional independence, quality of life, return to PLOF, and reduce the risk for falls. PLAN FOR NEXT VISIT:  Sitting HEP    DISCHARGE PLANNING was discussed with the pt/caregiver:   Frequency: 2wk4, 1wk1 with anticipated DC on 11/15. Extension for 2wk1, 3wk1 w possible recert on 05/04.

## 2021-12-04 PROCEDURE — 3331090001 HH PPS REVENUE CREDIT

## 2021-12-04 PROCEDURE — 3331090002 HH PPS REVENUE DEBIT

## 2021-12-04 NOTE — HOME HEALTH
Mony Escalante MD [phone], spoke to MD gave verbal order for wound care to left lower leg (traumatic): clease wound cleanser, apply medihoney, calcium alginate, dd, roll gauze change MWF. SN to text MD picture of wound after visit at stated number  Skilled reason for visit: skilled assessment, education, medication management, wound care    Caregiver involvement:  Paid caregiver/ Family assists ADL's, medications, meals, transportation, errands    Medications reviewed and all medications are available in the home this visit. The following education was provided regarding medications, medication interactions, and look alike medications (specify): Drug-Drug: ibuprofen and aspirin delayed-release. The antiplatelet and cardioprotective effect of aspirin may be decreased with the concurrent use of some NSAIDs, particularly during the washout period of the NSAID. Medications  are somewhat effective at this time. Home health supplies by type and quantity ordered/delivered this visit include: Order Number : [id number]    Patient education provided this visit: Skilled nurse instructed patient on safety measures to avoid injuries and falls such as keeping adequate lighting during the day and night and was educated on proper use of assistive device to prevent falls. Instructed in materials used in wound care. However, even with proper treatment, a wound infection may occur. Check the wound daily for signs of infection like increased drainage or bleeding from the wound that wont stop with direct pressure, redness in or around the wound, foul odor or pus coming from the wound, increased swelling around the wound and ever above 101.0°F or shaking chills.     Skilled Care Performed this visit: skilled assessment, education, medication management, wound care  Agency Progress toward goals: progressing    Patient's Progress towards personal goals: progressing    Home exercise program: deep breathing exercises daily to promote air exchange and prevent pneumonia.       Continued need for the following skills: Nursing and Physical Therapy    Plan for next visit: skilled assessment, education, medication management, wound care    Patient and/or caregiver notified and agrees to changes in the Plan of Care YES      The following discharge planning was discussed with the pt/caregiver: when incision healed, pain controlled and family independent with medications

## 2021-12-05 VITALS
SYSTOLIC BLOOD PRESSURE: 131 MMHG | TEMPERATURE: 97.7 F | HEART RATE: 75 BPM | OXYGEN SATURATION: 94 % | DIASTOLIC BLOOD PRESSURE: 75 MMHG

## 2021-12-05 PROCEDURE — 3331090002 HH PPS REVENUE DEBIT

## 2021-12-05 PROCEDURE — 3331090001 HH PPS REVENUE CREDIT

## 2021-12-05 NOTE — CASE COMMUNICATION
PT re-assessment completed on 12/1/2021. PT frequency:  2w1, 3w1. Her certification period ends 12/13/2021, and PT plans to re-certify and continue PT in the new cert period due to her frequent falls and need to continue to address balance and safety.

## 2021-12-06 ENCOUNTER — HOME CARE VISIT (OUTPATIENT)
Dept: SCHEDULING | Facility: HOME HEALTH | Age: 86
End: 2021-12-06
Payer: MEDICARE

## 2021-12-06 PROCEDURE — G0299 HHS/HOSPICE OF RN EA 15 MIN: HCPCS

## 2021-12-06 PROCEDURE — 3331090001 HH PPS REVENUE CREDIT

## 2021-12-06 PROCEDURE — 3331090002 HH PPS REVENUE DEBIT

## 2021-12-06 NOTE — HOME HEALTH
Skilled reason for visit: skilled assessment, education, medication management, wound care   Caregiver involvement: Paid caregiver/ Family assists ADL's, medications, meals, transportation, errands   Medications reviewed and all medications are available in the home this visit. The following education was provided regarding medications, medication interactions, and look alike medications (specify): Drug-Drug: colchicine and atorvastatin. Concurrent use of the statin drugs and colchicine may increase the risk of myopathy or rhabdomyolysis, which is characterized by progressive muscle weakness and pain in the presence of a normal neurological exam    Medications are somewhat effective at this time. Home health supplies by type and quantity ordered/delivered this visit include: Order Number : na  Patient education provided this visit: Skilled nurse instructed patient on safety measures to avoid injuries and falls such as keeping adequate lighting during the day and night and was educated on proper use of assistive device to prevent falls. Instructed in materials used in wound care. However, even with proper treatment, a wound infection may occur. Check the wound daily for signs of infection like increased drainage or bleeding from the wound that wont stop with direct pressure, redness in or around the wound, foul odor or pus coming from the wound, increased swelling around the wound and ever above 101.0°F or shaking chills. Skilled Care Performed this visit: skilled assessment, education, medication management, wound care   Agency Progress toward goals: progressing   Patient's Progress towards personal goals: progressing   Home exercise program: deep breathing exercises daily to promote air exchange and prevent pneumonia.    Continued need for the following skills: Nursing and Physical Therapy   Plan for next visit: skilled assessment, education, medication management, wound care   Patient and/or caregiver notified and agrees to changes in the Plan of Care YES   The following discharge planning was discussed with the pt/caregiver: when incision healed, pain controlled and family independent with medications

## 2021-12-07 ENCOUNTER — HOME CARE VISIT (OUTPATIENT)
Dept: SCHEDULING | Facility: HOME HEALTH | Age: 86
End: 2021-12-07
Payer: MEDICARE

## 2021-12-07 VITALS
HEART RATE: 76 BPM | TEMPERATURE: 97.7 F | DIASTOLIC BLOOD PRESSURE: 76 MMHG | SYSTOLIC BLOOD PRESSURE: 124 MMHG | OXYGEN SATURATION: 96 %

## 2021-12-07 PROCEDURE — G0157 HHC PT ASSISTANT EA 15: HCPCS

## 2021-12-07 PROCEDURE — 3331090002 HH PPS REVENUE DEBIT

## 2021-12-07 PROCEDURE — 3331090001 HH PPS REVENUE CREDIT

## 2021-12-07 NOTE — HOME HEALTH
DOCTOR'S VISIT:  Housecall from Dr Alyssa Mclaughlin 12/8 at 1:00    SUBJECTIVE:  Pt c/o aching all over, that the wound on left shin stings, that she just came back from using the bathroom, and has been sleeping most of the day. \"I don't get up except to go to the bathroom. \"    NARRATIVE:  Observed bandage and noted 2 quarter sized areas of serosanginious drainage, and that distal half of LLE has cont edema & increased erythemia. Pt informed therapist of the nurse coming tomorrow to change the bandage and that her PCP was making a housecall tomorrow; emails SN  & LPN of PCP's housecall tomorrow. Instructed in, gave handout for, and pt performed sitting ther ex for HEP for ALEXANDER LEs x 10 ea:  LAQ w 5 sec hold, HS curls, Marches, Hip ADD/ABD, HR/TR, and unsupported sit w MC/VC for upright posture w thoracic ext wo chin or shoulder girdle lift. Pt c/o increased left knee pain w LAQ from 3/10 to 6/10 the resolved w rest and elevation. Pt ed again on daily performance of HEP x 2-3 sessions to maintain her BLE strength to allow for ADLs and to prevent falls. CAREGIVER INVOLVEMENT/ASSISTANCE NEEDED FOR:  Pt's daughter, PCA, and renter upstairs assists w ADLs, medications, meals, and transportation. HOME HEALTH SUPPLIES by type and quantity ordered/delivered this visit include: None    ASSESSMENT AND PROGRESS TOWARD GOALS:    Progressing fair toward goals for gait, transfers, balance and strength as she was able to perform 5 sitting ther ex wo sig increased left knee pain. 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 Patient will benefit from continued intervention with progression of all PT goals to improve functional independence, prevent falls, decrease burden of care, and improve quality of life.     CONTINUED NEED FOR THE FOLLOWING SKILLS:  HH PT is medically necessary to address pain, decreased ROM of BLE, decreased strength, decreased independence with functional transfers, impaired gait, impaired stair negotiation, and impaired balance in order to improve functional independence, quality of life, return to PLOF, and reduce the risk for falls. PLAN FOR NEXT VISIT:  Standing HEP    DISCHARGE PLANNING was discussed with the pt/caregiver:   Frequency: 2wk4, 1wk1 with anticipated DC on 11/15. Extension for 2wk1, 3wk1 w 2wk1 remaining w possible recert on 99/90.

## 2021-12-08 ENCOUNTER — HOME CARE VISIT (OUTPATIENT)
Dept: HOME HEALTH SERVICES | Facility: HOME HEALTH | Age: 86
End: 2021-12-08
Payer: MEDICARE

## 2021-12-08 PROCEDURE — 3331090001 HH PPS REVENUE CREDIT

## 2021-12-08 PROCEDURE — 3331090002 HH PPS REVENUE DEBIT

## 2021-12-08 NOTE — Clinical Note
I'm not sure yet, she has the wound still, I know pcp saw her yesterday, not sure if he changed anything with the wound care, I was going to reassess during visit.  ----- Message -----  From: Jaun Hernandez PTA  Sent: 12/8/2021   6:11 PM EST  To: Rae Mccollum RN      Is it either of your plans to Re Cert pt? I would like to know if I am supposed to have pt sign Enxert[address] 30 or see pt on Friday?

## 2021-12-08 NOTE — CASE COMMUNICATION
Is it either of your plans to Re Cert pt? I would like to know if I am supposed to have pt sign Enxertos 30 or see pt on Friday?

## 2021-12-08 NOTE — Clinical Note
Plan to recert    ----- Message -----  From: Jaun Hernandez, PTA  Sent: 12/8/2021   6:11 PM EST  To: Jimbo Raphael PT      Is it either of your plans to Re Cert pt? I would like to know if I am supposed to have pt sign Enxertos 30 or see pt on Friday?

## 2021-12-09 ENCOUNTER — HOME CARE VISIT (OUTPATIENT)
Dept: SCHEDULING | Facility: HOME HEALTH | Age: 86
End: 2021-12-09
Payer: MEDICARE

## 2021-12-09 ENCOUNTER — HOME CARE VISIT (OUTPATIENT)
Dept: HOME HEALTH SERVICES | Facility: HOME HEALTH | Age: 86
End: 2021-12-09
Payer: MEDICARE

## 2021-12-09 VITALS
HEART RATE: 95 BPM | RESPIRATION RATE: 16 BRPM | DIASTOLIC BLOOD PRESSURE: 62 MMHG | SYSTOLIC BLOOD PRESSURE: 118 MMHG | OXYGEN SATURATION: 92 % | TEMPERATURE: 98.4 F

## 2021-12-09 VITALS
HEART RATE: 85 BPM | DIASTOLIC BLOOD PRESSURE: 71 MMHG | OXYGEN SATURATION: 98 % | TEMPERATURE: 97.3 F | SYSTOLIC BLOOD PRESSURE: 115 MMHG

## 2021-12-09 PROCEDURE — 3331090002 HH PPS REVENUE DEBIT

## 2021-12-09 PROCEDURE — A4452 WATERPROOF TAPE: HCPCS

## 2021-12-09 PROCEDURE — G0299 HHS/HOSPICE OF RN EA 15 MIN: HCPCS

## 2021-12-09 PROCEDURE — A4649 SURGICAL SUPPLIES: HCPCS

## 2021-12-09 PROCEDURE — G0157 HHC PT ASSISTANT EA 15: HCPCS

## 2021-12-09 PROCEDURE — 3331090001 HH PPS REVENUE CREDIT

## 2021-12-09 NOTE — HOME HEALTH
Skilled reason for visit: wound assessment/ dressing change, med management/education. Caregiver involvement: available for ADLs, meal prep, med management and transportation. Medications reviewed and all medications are available in the home this visit. The following education was provided regarding medications, medication interactions, and look alike medications (specify): sn instructed patient how BP meds keep veins open to allow for blood flow at lower pressure and how this can lead to dizziness and falls with sudden, quick movements. tylenol, use for pain relief, importance of not exceeding 3000mg daily and potenital for nausea. Medications  are effective at this time. Home health supplies by type and quantity ordered/delivered this visit include: medihoney and hypafix    Patient education provided this visit: discussed fall precautions in detail- having lighted hallways, removing throw rugs, monitoring medication that may alter mental status. patient/cg instructed to monitor for edema/increase in edema, to elevate extremity when edema occurs and to notify md if edema exceeds normal limits for patient. Watch for signs and symptoms of infection which include; fever, drainage, foul smell, lethargy. perform skin inspections looking for any skin breakdown or redness. monitor for edema. frequent position changes. Educated patient to elevate left leg to decrease edema in left leg. Skilled Care Performed this visit: wound assessment/ dressing change, med management/education. Patient's Progress towards personal goals: good    Home exercise program: sn instructed patient to perform deep breathing exercises, 5-6 breaths 5 x daily to promote air exchange and prevent pneumonia. Exercises per PT. Continued need for the following skills: Nursing and Physical Therapy    Plan for next visit: wound assessment/ dressing change, med management/education.     Patient and/or caregiver notified and agrees to changes in the Plan of Care N/A      The following discharge planning was discussed with the pt/caregiver: DC when SN no longer needed.

## 2021-12-09 NOTE — HOME HEALTH
DOCTOR'S VISIT:  TBD    SUBJECTIVE:  Pt reports that both the doctor and nurse have seen her wound and said that it was healing well, but pt thinks that her left leg looks very swollen. NARRATIVE:  Pt amb 42 feet x 1 w RW w Mod I to open door for therapist and go into the kitchen. Observed LLE and from knee to foot was 30-40% more edematous that RLE. Instructed in and gave handout again for HEP for dynamic balance and hip girdle strengthening:  sidestepping along 6 feet of counter x 1 laps w 2 UE support w MC/VC for correct form; step-overs w 1 UE support A<>P, w right foot then left as the stepping foot, and ALEXANDER UE support R<>L x 10 each; and retro/tandem walking along 6 feet of counter x 1 laps w 1 UE support. Pt needed seated rest break after each ex. Pt sit <>stand from armed kitchen chair x 4 w BUE push up w sig effort and slower than usual time to stand. Pt amb 28 feet w RW back to bedroom w Sup as pt reports increased LLE pain. Pt reports that the exercise made her a little tired, but her left knee doesn't hurt from the ex after sitting. CAREGIVER INVOLVEMENT/ASSISTANCE NEEDED FOR:  Pt's daughter, PCA, and renter upstairs assists w ADLs, medications, meals, and transportation. HOME HEALTH SUPPLIES by type and quantity ordered/delivered this visit include: None    ASSESSMENT AND PROGRESS TOWARD GOALS:    Progressing well toward goals for gait, transfers, balance and strength amb for a total of > 65 feet w RW and performed 4 challenging dynamic balance ex wo LOB w min increase in fatigue. 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 Patient will benefit from continued intervention with progression of all PT goals to improve functional independence, prevent falls, decrease burden of care, and improve quality of life. Bed Mobility: Mod I  Transfers:  Mod I for in home transfers and Min A for car transfer  Balance: Needs Tinetti  Strength: INeeds MMT  Gait: Apx >50 feet in home w RW Mod I  Stairs: close SBA  HEP:   Supine HEP for ALEXANDER LE x 10-30ea ea: SAQ, SLR, Hip ABD/ADD, Heel slides, ankle pumps, and bridges. Sitting ther ex for HEP for ALEXANDER LEs x 10 ea:  LAQ w 5 sec hold, HS curls, Marches, Hip ADD/ABD, HR/TR, and unsupported sit w MC/VC for upright posture w thoracic ext wo chin or shoulder girdle lift. Dynamic balance and hip girdle strengthening:  sidestepping along 6 feet of counter x 1-5 laps w 2 UE support w MC/VC for correct form; step-overs w 1 UE support A<>P, w right foot then left as the stepping foot, and ALEXANDER UE support R<>L x 10-30 each; and retro/tandem walking along 6 feet of counter x 1-5 laps w 1 UE support. Hourly amb or seated ther ex    CONTINUED NEED FOR THE FOLLOWING SKILLS:  HH PT is medically necessary to  address pain, decreased ROM of BLE, decreased strength, decreased independence with functional transfers, impaired gait, impaired stair negotiation, and impaired balance in order to improve functional independence, quality of life, return to PLOF, and reduce the risk for falls. PLAN FOR NEXT VISIT:  Recertification and extension    DISCHARGE PLANNING was discussed with the pt/caregiver:   Frequency: 2wk4, 1wk1 with anticipated DC on 11/15. Extension for 2wk1, 3wk1 w 1wk1 remaining w Recert on 13/62.

## 2021-12-10 ENCOUNTER — HOME CARE VISIT (OUTPATIENT)
Dept: SCHEDULING | Facility: HOME HEALTH | Age: 86
End: 2021-12-10
Payer: MEDICARE

## 2021-12-10 PROCEDURE — 3331090002 HH PPS REVENUE DEBIT

## 2021-12-10 PROCEDURE — 3331090001 HH PPS REVENUE CREDIT

## 2021-12-10 PROCEDURE — G0151 HHCP-SERV OF PT,EA 15 MIN: HCPCS

## 2021-12-11 ENCOUNTER — HOME CARE VISIT (OUTPATIENT)
Dept: SCHEDULING | Facility: HOME HEALTH | Age: 86
End: 2021-12-11
Payer: MEDICARE

## 2021-12-11 PROCEDURE — 3331090001 HH PPS REVENUE CREDIT

## 2021-12-11 PROCEDURE — G0299 HHS/HOSPICE OF RN EA 15 MIN: HCPCS

## 2021-12-11 PROCEDURE — 3331090002 HH PPS REVENUE DEBIT

## 2021-12-11 NOTE — HOME HEALTH
Physician Notification and Justification to Continue Services:     Justification for continued intermittent care: patient with wound care concerns as follows L Lower leg open wound addressed by SN, patient with fall with injury and Frequent falls with need for extensive training to ensure safety. Dr. Geena Dawson notified of the following: the need for continued Skilled Nursing and Physical Therapy home health services for above reasons, plan of care including visit frequency of SN: 1w1, 2w8, 2 PRN,  Physical Therapy for 2w3, additional assessment and education on wound care (SN) and strength, balance, fall prevention, transfer and gait training by PT. Caregiver involvement: Daughter is CG and assists with meds, managing healthcare needs and appointments, transportation, shopping. Medications reconciled and all medications are available in the home this visit. The following education was provided regarding medications, medication interactions, and look a like medications: Take as directed by MD.  Medications  are effective at this time. Home health supplies by type and quantity ordered/delivered this visit include: n/a    Patient education provided this visit:  Educated patient re: plan to recert and continue for PT and SN. Progress toward goals: Tinetti:  No change from last assessment: 14/28, decline from 11/2/2021 score if 19/28. Gait:  Walks with Fww through home. Transfers: Increased difficulty at this time due to pain in L LL and L knee pain. Patient needs further training and LE strengthening to improve safety. Fall prevention education is needed to decrease incidence of falls, by prevention of activity that leads to falls. Patient has fallen due to her attempting to complete activities in a way that is not safe for her, and that has eventually led to a fall. Education to patient as well as caregivers is needed to prevent activity that may lead to a future fall.     Home exercise program: HEP for dynamic balance and hip girdle strengthening:  sidestepping along 6 feet of counter x 1-5 laps w 2 UE support w MC/VC for correct form; step-overs w 1 UE support A<>P, w right foot then left as the stepping foot, and ALEXANDER UE support R<>L x 10-30 each; and retro/tandem walking along 6 feet of counter x 1-5 laps w 1 UE support.     Continued need for the following skills: Physical Therapy  2w3    The following discharge planning was discussed with the pt/caregiver:  HHPT 2w3, discharge patient to self, caregiver and physician when goals are met or max benefit is achieved,

## 2021-12-12 VITALS
OXYGEN SATURATION: 95 % | RESPIRATION RATE: 16 BRPM | HEART RATE: 66 BPM | DIASTOLIC BLOOD PRESSURE: 68 MMHG | TEMPERATURE: 97.3 F | SYSTOLIC BLOOD PRESSURE: 120 MMHG

## 2021-12-12 PROCEDURE — 3331090001 HH PPS REVENUE CREDIT

## 2021-12-12 PROCEDURE — 3331090002 HH PPS REVENUE DEBIT

## 2021-12-12 NOTE — CASE COMMUNICATION
PT recert completed with frequency 2w3 for PT. Cert period begins 09/49/9303.   Thank arcelia for your referral.

## 2021-12-13 ENCOUNTER — HOME CARE VISIT (OUTPATIENT)
Dept: SCHEDULING | Facility: HOME HEALTH | Age: 86
End: 2021-12-13
Payer: MEDICARE

## 2021-12-13 VITALS
RESPIRATION RATE: 18 BRPM | SYSTOLIC BLOOD PRESSURE: 140 MMHG | TEMPERATURE: 97.4 F | OXYGEN SATURATION: 98 % | HEART RATE: 72 BPM | DIASTOLIC BLOOD PRESSURE: 78 MMHG

## 2021-12-13 VITALS
HEART RATE: 77 BPM | TEMPERATURE: 97.6 F | OXYGEN SATURATION: 96 % | SYSTOLIC BLOOD PRESSURE: 136 MMHG | RESPIRATION RATE: 16 BRPM | DIASTOLIC BLOOD PRESSURE: 88 MMHG

## 2021-12-13 PROCEDURE — 3331090001 HH PPS REVENUE CREDIT

## 2021-12-13 PROCEDURE — 3331090002 HH PPS REVENUE DEBIT

## 2021-12-13 PROCEDURE — G0299 HHS/HOSPICE OF RN EA 15 MIN: HCPCS

## 2021-12-13 NOTE — HOME HEALTH
Skilled reason for visit: LLL wound care, disease and medication assessment. Caregiver: CG/daughter, PCA  who assist with ADL's shower, provided reminders with daily medication, assist daily meals, run errands and MD appt.prn.   Medications reviewed and all medications are available in the home this visit. The following education was provided regarding medications, medication interactions, and look alike medications (specify): N/A. Medications  are effective at this time. No added new medication. Home health supplies by type and quantity ordered/delivered this visit include: Supplies available at home. Patient education provided this visit: Reviewed teaching intervention to prevent infection; hand washing and wearing face mask during clinician and when going out to public places. Keeping LLL dressing dry and clean, reinforce when dislodge prn. BLE skin care; apply daily moisturizing cream. Safety and fall prec; placing walker in easy access, avoid getting up when feeling weak and dizzy. Increasing protein intake, continue heart healthy diet and good hydration. Avoid sedentary life style. Reviewed S/S of infection; fever 101.3, cloudy foul smell urine, coughing with yellow thick sputum, increase pain, swelling, not feeling well 2-3 days, SOB, and to call HCFA or MD for assistance if experiencing any of these S/S. To call 911 with chest pains, facial drooping, difficulty talking, non arousal/unconscious and uncontrollable bleeding. Patient level of understanding of education provided: Good understanding. Skilled Care Performed this visit:  Completed assessment, performed LLL wound care; cleansed with WBC, pat dry, measured then applied Medihoney covered with gauze pads secured with Medipore tape. Wound drainage decreased, no redness or S/S of infection. Patient response to procedure performed:  Pt tolerated well during the procedure with minimal pain  scale 2/10). Agency Progress toward goals:  To continue weekly skilled service  for LLL wound care. Patient's Progress towards personal goals: Progressing towards goals. Pt manage daily medication independently. Home exercise program/Homework provided: Ambulation, HEP deep breathing exercises 10x when having SOB, pain and anxiety. Continued need for the following skills: SN  Plan for next visit: Continue LLL wound care and monitor health condition. Patient and/or caregiver notified and agrees to changes in the Plan of Care: N/A  Discharge planning discussed with patient and caregiver. Discharge planning as follows: Pt reach her maximum level of function, LLL wound is healed, health condition stable and goals met. COVID - 23 Screening completed before visit:     Denies and no family member  has any of these S/S:  Fever, dry cough, chills, sore throat diarrhea, body aches, not feeling well and loss of taste.

## 2021-12-13 NOTE — HOME HEALTH
Skilled reason for visit: Skilled assessment, wound care  Caregiver involvement: Patient clean, dressed and well nourished. Home neat and clean. Medications reconciled and all medications are available in the home this visit. Medications  are effective at this time. Home health supplies by type and quantity ordered/delivered this visit include: None this visit  Patient education provided this visit: Medication  Progress toward goals: Progressing  Home exercise program: Participating  Continued need for the following skills: Nursing  The following discharge planning was discussed with the pt/caregiver:  Will d/c to home/self care when goals are met  Wound care provided as prescribed and documented Patient tolerated without any compliants of pain and acknowledged education on wound care procedures

## 2021-12-14 ENCOUNTER — HOME CARE VISIT (OUTPATIENT)
Dept: SCHEDULING | Facility: HOME HEALTH | Age: 86
End: 2021-12-14
Payer: MEDICARE

## 2021-12-14 PROCEDURE — 3331090002 HH PPS REVENUE DEBIT

## 2021-12-14 PROCEDURE — G0157 HHC PT ASSISTANT EA 15: HCPCS

## 2021-12-14 PROCEDURE — 400018 HH-NO PAY CLAIM PROCEDURE

## 2021-12-14 PROCEDURE — 3331090001 HH PPS REVENUE CREDIT

## 2021-12-14 PROCEDURE — 400014 HH F/U

## 2021-12-14 NOTE — HOME HEALTH
DOCTOR'S VISIT:  TBD     SUBJECTIVE:  Pt reports that she feels like her left leg is still swollen. \"I'm tired bc I have been doing some [employer] decorating. \"  Pt reports that she gets up and walks to the bathroom 2-3 times a day, goes to kitchen to get her meals, and takes care of her cat, but that she is in her recliner w her LLE elevated. NARRATIVE:  Pt amb to and from front door w RW to open door for therapist x 38 feet x 1 w Mod I. During standing ther ex pt performed 5 sit <> stands w BUE push up w elecrtic left recliner 3 inches above standard height w Sup. Pt performed in standing forward reach w 1 UE support x 5 each arm, clap hands w therapist x 5 w each hand, flooking to find therapist's hand and touch w hers x 5 each, and therapist performed Rhythmic Stabilization A<>P x 5, R<>L x 5 and random x 10. In sitting, pt performed dynamic sitting balance and core strengthening: w straight arms w  forward, lateral, and across the body reaching x 5 ea. Instructed in and pt performed ALEXANDER trunk rotation w bow and arrow arms in sitting w MC/VC x 5 ea. SLS w 2 sec hold x 5 ea w BUE support. Pt c/o left knee pain in SLS resolved w sitting. CAREGIVER INVOLVEMENT/ASSISTANCE NEEDED FOR:  Pt's daughter, PCA, and renter upstairs assists w ADLs, medications, meals, and transportation. HOME HEALTH SUPPLIES by type and quantity ordered/delivered this visit include: None    ASSESSMENT AND PROGRESS TOWARD GOALS:    Progressing well toward goals for transfers, balance and strength w performance of 5 transfers, and progression to more challenging 4 standing ex and 4 seated ther ex wo LOB and min increase in left knee pain. 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     Patient will benefit from continued intervention with progression of all PT goals to improve functional independence, prevent falls, decrease burden of care, and improve quality of life. CONTINUED NEED FOR THE FOLLOWING SKILLS:  HH PT is medically necessary to  address pain, decreased ROM of BLE, decreased strength, decreased independence with functional transfers, impaired gait, impaired stair negotiation, and impaired balance in order to improve functional independence, quality of life, return to PLOF, and reduce the risk for falls. PLAN FOR NEXT VISIT:  Cont w standing balance and core strengthening    DISCHARGE PLANNING was discussed with the pt/caregiver:     Frequency: 2wk4, 1wk1. Extension for 2wk1, 3wk1 w 1wk1 remaining w Recert on 69/35. Extension of 2wk3 w 1wk1, 2wk2 remaining with anticipated DC on 12/31.

## 2021-12-15 VITALS
TEMPERATURE: 97.7 F | OXYGEN SATURATION: 93 % | DIASTOLIC BLOOD PRESSURE: 67 MMHG | HEART RATE: 75 BPM | SYSTOLIC BLOOD PRESSURE: 124 MMHG

## 2021-12-15 PROCEDURE — 3331090002 HH PPS REVENUE DEBIT

## 2021-12-15 PROCEDURE — 3331090001 HH PPS REVENUE CREDIT

## 2021-12-16 ENCOUNTER — HOME CARE VISIT (OUTPATIENT)
Dept: SCHEDULING | Facility: HOME HEALTH | Age: 86
End: 2021-12-16
Payer: MEDICARE

## 2021-12-16 VITALS
SYSTOLIC BLOOD PRESSURE: 122 MMHG | TEMPERATURE: 97.7 F | DIASTOLIC BLOOD PRESSURE: 74 MMHG | OXYGEN SATURATION: 97 % | HEART RATE: 78 BPM

## 2021-12-16 PROCEDURE — 3331090001 HH PPS REVENUE CREDIT

## 2021-12-16 PROCEDURE — G0157 HHC PT ASSISTANT EA 15: HCPCS

## 2021-12-16 PROCEDURE — 3331090002 HH PPS REVENUE DEBIT

## 2021-12-16 NOTE — HOME HEALTH
DOCTOR'S VISIT:  TBD  PCP makes housecalls    SUBJECTIVE:  Pt reports that she is tired bc she had been getting packages together to send for [place]. NARRATIVE:  Pt amb w Mod I w RW to open front door for therapist and return to bedroom x >50 feet x 1 w slow, flexed trunk, kyphotic, head forward posture. Standing w feet together for dual taksing UE activites x 10ea: clap hands w therapist, lean forward w reaching w 1 UE support on RW, alt lateral step and tap foot to the front x 10 and to the side x 10. Pt needed seated rest break in between each ex due to fatigue. Had pt perform Tinetti Balance Test.  Tinetti score on Initial Eval on 10/15 was 11/28; on 11/2 was 19/28; for 3 successive weeks score was 14/28;  today's score was 21/28. Pt ed for cont medium risk for falls and fall prevention. CAREGIVER INVOLVEMENT/ASSISTANCE NEEDED FOR:  Pt's daughter, PCA, and renter upstairs assists w ADLs, medications, meals, and transportation. HOME HEALTH SUPPLIES by type and quantity ordered/delivered this visit include: None    ASSESSMENT AND PROGRESS TOWARD GOALS:    Progressing well toward goals for transfers, balance and strength w progressing 4 functional standing balance and core strengthening ex wo unsteadiness or LOB. 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 Patient will benefit from continued intervention with progression of all PT goals to improve functional independence, prevent falls, decrease burden of care, and improve quality of life.     CONTINUED NEED FOR THE FOLLOWING SKILLS:  HH PT is medically necessary to  address pain, decreased ROM of BLE, decreased strength, decreased independence with functional transfers, impaired gait, impaired stair negotiation, and impaired balance in order to improve functional independence, quality of life, return to PLOF, and reduce the risk for falls. PLAN FOR NEXT VISIT:  Cont w functional standing balance and core strengthening    DISCHARGE PLANNING was discussed with the pt/caregiver:   Frequency: 2wk4, 1wk1. Extension for 2wk1, 3wk1 w 1wk1 remaining w Recert on 54/93. Extension of 2wk3 w 2wk2 remaining with anticipated DC on 12/31.

## 2021-12-17 ENCOUNTER — HOME CARE VISIT (OUTPATIENT)
Dept: SCHEDULING | Facility: HOME HEALTH | Age: 86
End: 2021-12-17
Payer: MEDICARE

## 2021-12-17 VITALS
TEMPERATURE: 97.8 F | OXYGEN SATURATION: 95 % | HEART RATE: 81 BPM | RESPIRATION RATE: 16 BRPM | DIASTOLIC BLOOD PRESSURE: 71 MMHG | SYSTOLIC BLOOD PRESSURE: 118 MMHG

## 2021-12-17 PROCEDURE — 3331090002 HH PPS REVENUE DEBIT

## 2021-12-17 PROCEDURE — 3331090001 HH PPS REVENUE CREDIT

## 2021-12-17 PROCEDURE — G0299 HHS/HOSPICE OF RN EA 15 MIN: HCPCS

## 2021-12-18 PROCEDURE — 3331090002 HH PPS REVENUE DEBIT

## 2021-12-18 PROCEDURE — 3331090001 HH PPS REVENUE CREDIT

## 2021-12-18 NOTE — HOME HEALTH
Skilled reason for visit: Patient was recently hospitalized for Bilateral knee, requiring observation by a SN for s/s of decomposition or adverse effects resulting from newly prescribed medications. Skilled observation needed to determine if new medication regimen prescribed requires modifications or other therapeutic interventions considered until pt's clinical condition or treatment has stabilized. Caregiver involvement:  Patient's caregiver is her daughter. Caregiver assists patient with bathing, dressing, walking, bathroom, meal prep and setup, medication management, grocery shopping, household chores, transportation to MD appointment and home exercise program.  Medications reconciled and all medications are available in the home this visit. The following education was provided regarding medications, medication interactions, and look alike medications:  acetaminophen (TYLENOL) 325 mg tablet - for pain - Contact Agency or MD with questions. Medications are effective at this time. Patient states understanding. Patient education provided this visit:  . LESLIE [facility] Disease Management: Wound care, wound healing, ekevation O L eg to reduce edema, HTN teaching, pain management, fall precautions, s/s of infection  Patient level of understanding of education provided: Good  Skilled Care Performed this visit: Disease process teaching, medication teaching, physical assessment and monitoring, wound care  Patient response to procedure performed:  No complaints  Goals/teaching progressing. Patient's goal is to woubd healing. Patient has remained free from falls, free from infection; no safety concerns at this time and is ambulating independently with walker.   SN to complete education of patient and patient to follow up with    Home exercise program:   Continued need for the following skills: Nursing  Patient and/or caregiver notified and agree to changes in the Plan of Care: No changes  The following discharge planning was discussed with the pt/caregiver:  SN to continue education of patient and discharge patient when teaching and goals are met.

## 2021-12-19 PROCEDURE — 3331090001 HH PPS REVENUE CREDIT

## 2021-12-19 PROCEDURE — 3331090002 HH PPS REVENUE DEBIT

## 2021-12-20 ENCOUNTER — HOME CARE VISIT (OUTPATIENT)
Dept: SCHEDULING | Facility: HOME HEALTH | Age: 86
End: 2021-12-20
Payer: MEDICARE

## 2021-12-20 PROCEDURE — 3331090001 HH PPS REVENUE CREDIT

## 2021-12-20 PROCEDURE — 3331090002 HH PPS REVENUE DEBIT

## 2021-12-20 PROCEDURE — G0299 HHS/HOSPICE OF RN EA 15 MIN: HCPCS

## 2021-12-21 ENCOUNTER — HOME CARE VISIT (OUTPATIENT)
Dept: SCHEDULING | Facility: HOME HEALTH | Age: 86
End: 2021-12-21
Payer: MEDICARE

## 2021-12-21 VITALS
DIASTOLIC BLOOD PRESSURE: 66 MMHG | HEART RATE: 70 BPM | RESPIRATION RATE: 18 BRPM | OXYGEN SATURATION: 96 % | SYSTOLIC BLOOD PRESSURE: 134 MMHG | TEMPERATURE: 97.3 F

## 2021-12-21 VITALS
DIASTOLIC BLOOD PRESSURE: 71 MMHG | OXYGEN SATURATION: 96 % | SYSTOLIC BLOOD PRESSURE: 117 MMHG | TEMPERATURE: 97.7 F | HEART RATE: 77 BPM

## 2021-12-21 PROCEDURE — 3331090002 HH PPS REVENUE DEBIT

## 2021-12-21 PROCEDURE — G0157 HHC PT ASSISTANT EA 15: HCPCS

## 2021-12-21 PROCEDURE — 3331090001 HH PPS REVENUE CREDIT

## 2021-12-21 NOTE — HOME HEALTH
DOCTOR'S VISIT:  TBD    SUBJECTIVE:  Pt reports that the nuse came yesterday and changed the bandage. \"I saw it, it looks bad, but the nurse said it healed some. \"  \"I'm feeling lazy today. \"    NARRATIVE:  Pt amb as usual to open front door for therapist w RW w Mod I x >50 feet x 1 w slow, antalgic, forward flexed posture. Pt educated on increasing core strength w functional reaching as she franco her RW at the kitchen, livingroom, and front door to the \"furniture walk\" w reaching out to support herself on the furniture or wall. Pt laughed and agreed. Instructed in SLS w BUE support x 10 x 5 each LE; Functional reach w BUE at shoulder height in standing w close SBA to front, to R and to Left x 5 ea. Pt c/o increased LBP w forward reach. Therapist performed Rhythmic Stabiliation in standing w 3 sec hold x 5 A<>P and R<>L x 2 sets each. Pt sit<>stand w electric recliner at 3 inches above standard height x 5 w BUE push up w 3-5 attampts to stand w each transfer. Pt needed seated rest break in between each activity. Pt reports the knee and back pain resolved w sitting. \"Now I'm ready for a nap. \"    CAREGIVER INVOLVEMENT/ASSISTANCE NEEDED FOR:  Pt's daughter, PCA, and renter upstairs assists w ADLs, medications, meals, and transportation. HOME HEALTH SUPPLIES by type and quantity ordered/delivered this visit include: None    ASSESSMENT AND PROGRESS TOWARD GOALS:    Progressing well toward goals for transfers, balance and strength w progressing 4 functional reaching balance and core strengthening ex wo unsteadiness or LOB w 5 STS as rest breaks. 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     Patient will benefit from continued intervention with progression of all PT goals to improve functional independence, prevent falls, decrease burden of care, and improve quality of life.    CONTINUED NEED FOR THE FOLLOWING SKILLS:  HH PT is medically necessary to  address pain, decreased ROM of BLE, decreased strength, decreased independence with functional transfers, impaired gait, impaired stair negotiation, and impaired balance in order to improve functional independence, quality of life, return to PLOF, and reduce the risk for falls. PLAN FOR NEXT VISIT:  Cont w functional standing balance and core strengthening    DISCHARGE PLANNING was discussed with the pt/caregiver:   Frequency: 2wk4, 1wk1. Extension for 2wk1, 3wk1 w 1wk1 remaining w Recert on 95/58. Extension of 2wk3 w 2wk2, w 1wk1, 2wk1 remaining with anticipated DC on 12/31.

## 2021-12-22 ENCOUNTER — HOME CARE VISIT (OUTPATIENT)
Dept: SCHEDULING | Facility: HOME HEALTH | Age: 86
End: 2021-12-22
Payer: MEDICARE

## 2021-12-22 PROCEDURE — 3331090001 HH PPS REVENUE CREDIT

## 2021-12-22 PROCEDURE — G0157 HHC PT ASSISTANT EA 15: HCPCS

## 2021-12-22 PROCEDURE — 3331090002 HH PPS REVENUE DEBIT

## 2021-12-22 NOTE — HOME HEALTH
DOCTOR'S VISIT:  TBD    SUBJECTIVE:  Pt states that she didn't know that her family was coming over today, and that her wound is draining more since she was up doing more to get ready for [place]. NARRATIVE:  Therapist arrived as several family memebers made a suprise visit. Instructed in and pt performed stepping on & off green foam w 1 UE support R<>L and A<>P w left foot, then right foot, as the stepping foot x 10 ea w MC/VC for correct form wo LOB. Instructed in and pt performed on green therafoam for static balance and core staility: w feet 6, 4 & 2 inchesapart and together wo UE support x 30 sec w min increased postural sway. Pt needed 1 seated rest break. Pt amb > 50 feet w RW w Mod I to open front door. Left pt w family in den. Reminded pt that PT will end next week. CAREGIVER INVOLVEMENT/ASSISTANCE NEEDED FOR:  Pt's daughter, PCA, and renter upstairs assists w ADLs, medications, meals, and transportation. HOME HEALTH SUPPLIES by type and quantity ordered/delivered this visit include: None    ASSESSMENT AND PROGRESS TOWARD GOALS:    Progressing well toward goals for dynamic & staitic balance and strength w performance of stepping on and off unstable therafoam R<>L and A<>P w right and left LEs for dynamic balance and progressing from feet 6 inches apart to feet together on unstable therafoam.  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 Patient will benefit from continued intervention with progression of all PT goals to improve functional independence, prevent falls, decrease burden of care, and improve quality of life.     CONTINUED NEED FOR THE FOLLOWING SKILLS:  HH PT is medically necessary to  address pain, decreased ROM of BLE, decreased strength, decreased independence with functional transfers, impaired gait, impaired stair negotiation, and impaired balance in order to improve functional independence, quality of life, return to PLOF, and reduce the risk for falls. PLAN FOR NEXT VISIT:  Cont w functional standing balance and core strengthening. TUG and Tinetti. DISCHARGE PLANNING was discussed with the pt/caregiver:   Frequency: 2wk4, 1wk1. Extension for 2wk1, 3wk1 w 1wk1 remaining w Recert on 86/31. Extension of 2wk3 w 2wk2, w 2wk1 remaining with anticipated DC on 12/30.

## 2021-12-23 ENCOUNTER — HOME CARE VISIT (OUTPATIENT)
Dept: SCHEDULING | Facility: HOME HEALTH | Age: 86
End: 2021-12-23
Payer: MEDICARE

## 2021-12-23 PROCEDURE — 3331090001 HH PPS REVENUE CREDIT

## 2021-12-23 PROCEDURE — 3331090002 HH PPS REVENUE DEBIT

## 2021-12-24 ENCOUNTER — HOME CARE VISIT (OUTPATIENT)
Dept: SCHEDULING | Facility: HOME HEALTH | Age: 86
End: 2021-12-24
Payer: MEDICARE

## 2021-12-24 PROCEDURE — 3331090002 HH PPS REVENUE DEBIT

## 2021-12-24 PROCEDURE — 3331090001 HH PPS REVENUE CREDIT

## 2021-12-24 PROCEDURE — G0300 HHS/HOSPICE OF LPN EA 15 MIN: HCPCS

## 2021-12-25 PROCEDURE — 3331090001 HH PPS REVENUE CREDIT

## 2021-12-25 PROCEDURE — 3331090002 HH PPS REVENUE DEBIT

## 2021-12-26 VITALS
OXYGEN SATURATION: 98 % | TEMPERATURE: 96.9 F | DIASTOLIC BLOOD PRESSURE: 78 MMHG | SYSTOLIC BLOOD PRESSURE: 140 MMHG | HEART RATE: 79 BPM

## 2021-12-26 PROCEDURE — 3331090001 HH PPS REVENUE CREDIT

## 2021-12-26 PROCEDURE — 3331090002 HH PPS REVENUE DEBIT

## 2021-12-27 ENCOUNTER — HOME CARE VISIT (OUTPATIENT)
Dept: SCHEDULING | Facility: HOME HEALTH | Age: 86
End: 2021-12-27
Payer: MEDICARE

## 2021-12-27 VITALS
SYSTOLIC BLOOD PRESSURE: 133 MMHG | DIASTOLIC BLOOD PRESSURE: 72 MMHG | OXYGEN SATURATION: 99 % | RESPIRATION RATE: 16 BRPM | TEMPERATURE: 97.1 F | HEART RATE: 61 BPM

## 2021-12-27 PROCEDURE — 3331090002 HH PPS REVENUE DEBIT

## 2021-12-27 PROCEDURE — 3331090001 HH PPS REVENUE CREDIT

## 2021-12-27 PROCEDURE — G0299 HHS/HOSPICE OF RN EA 15 MIN: HCPCS

## 2021-12-27 NOTE — HOME HEALTH
reshma burgos 80 y old female daughter cg lives next door to [place] with walker  Skilled reason for visit: education of diagnosis and care/teaching related to medication mgt ,disease mgt and assessment. Skilled Care Performed this visit  wound care to left leg trauma wound  Patient response to procedure was receptive and positive  Reviewed medications and care plan for changes. patient/cg to continue to take medications as prescribed. patient aware to monitor for effectiveness and to notify staff of any adverse reactions to medications/any changes to medication regimen. reviewed side effects, purposes, dosage, frequencies  patient encouraged to monitor for increase in pain and to continue with current pain management and to notify staff/md if pain becomes intolerable. Caregiver involvement: Family and friend are available at all times for assistance with iadls, adls, meal prep, medication management, taking to md appointments. Agency Progress toward goals: meeting goals  Home health supplies by type and quantity ordered/delivered this visit: not applicable  Patient education provided this visit: assessment, teaching   Patient level of understanding of education provided: Understanding of education went well teaching and feedback welcomed and patients questions answered. Patient's Progress towards personal goals: - progressing well  Patient is aware of fall risk. Reviewed safety precautions with patient. Educated on ambulation and or bedbound safety if applicable. INSTRUCTED PATIENT AND CG THAT SHOULD ANY NEEDS OR CONCERNS ARISE TO FIRST CALL OUR OFFICE, OR THE DR'S OFFICE  OR GO TO AN URGENT CARE CENTER AND NOT TO THE ED FOR NON-LIFE THREATENING EVENTS. IF IT IS LIFE THREATENING THEN CALL 911 OR GO TO THE CLOSEST ER. Progress toward goals- continuing to work towards goals as reviewed in careplan with patient on each visit. Careplan goals reviewed.   Home exercise program:    activity as tolerated, trying to get physical activity 4-5 x weekly. stopping activity if causing shortness of breath or chest pain, dizziness or weakness Continued need for the following skills: Nursing, The following discharge planning was discussed with the pt/caregiver: Patient will be discharged once education has completed, and patient is medically stable. Plan for next visit: continue with plan of care and teaching with each visit until discharged.       Patient and/or caregiver notified and agrees to changes in the [address]

## 2021-12-28 ENCOUNTER — HOME CARE VISIT (OUTPATIENT)
Dept: SCHEDULING | Facility: HOME HEALTH | Age: 86
End: 2021-12-28
Payer: MEDICARE

## 2021-12-28 VITALS
DIASTOLIC BLOOD PRESSURE: 85 MMHG | TEMPERATURE: 97.5 F | HEART RATE: 83 BPM | OXYGEN SATURATION: 98 % | SYSTOLIC BLOOD PRESSURE: 137 MMHG

## 2021-12-28 PROCEDURE — 3331090001 HH PPS REVENUE CREDIT

## 2021-12-28 PROCEDURE — G0157 HHC PT ASSISTANT EA 15: HCPCS

## 2021-12-28 PROCEDURE — 3331090002 HH PPS REVENUE DEBIT

## 2021-12-28 NOTE — HOME HEALTH
DOCTOR'S VISIT:  TBD    SUBJECTIVE:  Pt reports that she feels very tired today and that she sounds a bit hoasre today. \"I have back problems today. \"  Pt states that she goes over her daughter's house nextdoor weekly ,and that she goes up and down the stairs w Sup. NARRATIVE:  Pt education on DC from [facility] on 12/30, but SN cont until wound has healed. Had pt perform Tinetti Balance Test.  Tinetti score on Initial Eval on 10/15 was 11/28; on 12/10 14/28; on 12/22 was 21/28; and today's score was 22/28. Pt ed for cont medium risk for falls and fall prevention. Pt amb w RW w SBA >100 feet from bedroom to street to include  down/up 3 front steps w BUE on 1 handrail w lateral step-to gait and along front walkway. Pt reports that pain increased w amb, but resolved w sitting, but now she is tired and needs a nap. CAREGIVER INVOLVEMENT/ASSISTANCE NEEDED FOR:  Pt's daughter, PCA, and renter upstairs assists w ADLs, medications, meals, and transportation. HOME HEALTH SUPPLIES by type and quantity ordered/delivered this visit include: None    ASSESSMENT AND PROGRESS TOWARD GOALS:    Progressing well toward goals for gait, stairs, strength, and balance w amb w RW w SBA >100 feet from bedroom to street to include  down/up 3 front steps w BUE on 1 handrail w lateral step-to gait and along front walkway to meet stairs and gait goals, and improved Tinetti score from Initial Eval on 10/15 was 11/28; on 12/10 14/28; on 12/22 was 21/28; and today's score was 22/28. 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 Bed Mobility: Mod I  Transfers: Mod I  Balance: Tinetti 22/28  Strength: needs MMT  Gait: >100 feet outdoors & stairs included  Stairs: down/up 3 front steps w BUE on 1 handrail w lateral step-to gait  HEP:   Standing HEP for BLE x 10ea:  Hip abd, hip ext, hip flexion, HS curls, minisquats, quarter squats, HR/TR w MC/VC for correct form and upright posture w TA draw. Sitting ther ex for HEP for ELÍAS LEs x 10 ea:  LAQ w 5 sec hold, HS curls, Marches, Hip ADD/ABD, HR/TR, and unsupported sit w MC/VC for upright posture w thoracic ext wo chin or shoulder girdle lift. Supine HEP for ELÍAS LE x 10ea ea: SAQ, SLR, Hip ABD/ADD, Heel slides, ankle pumps, and bridges. Sitting core stability & dynamic sitting balance w arms across chest: leaning forward<>backward and side<>side lean w return to upright w 1 sec hold x 10 ea; Elías trunk rotations x 10ea  Dynamic balance and hip girdle strengthening:  sidestepping along 6 feet of counter x 1-5 laps w 2 UE support w MC/VC for correct form; step-overs w 1 UE support A<>P, w right foot then left as the stepping foot, and ELÍAS UE support R<>L x 10-30 each; and retro/tandem walking along 6 feet of counter x 1-5 laps w 1 UE support. PLAN FOR NEXT VISIT:  DC at next visit as pt has met majority of goals and may have reached max potential.    DISCHARGE PLANNING was discussed with the pt/caregiver:   Frequency: 2wk4, 1wk1. Extension for 2wk1, 3wk1 w 1wk1 remaining w Recert on 73/18. Extension of 2wk3 w 2wk2, w 1wk1 remaining with anticipated DC from [facility] PT on 12/30.

## 2021-12-29 VITALS
HEART RATE: 76 BPM | DIASTOLIC BLOOD PRESSURE: 68 MMHG | SYSTOLIC BLOOD PRESSURE: 140 MMHG | OXYGEN SATURATION: 98 % | RESPIRATION RATE: 18 BRPM | TEMPERATURE: 97.6 F

## 2021-12-29 PROCEDURE — 3331090001 HH PPS REVENUE CREDIT

## 2021-12-29 PROCEDURE — 3331090002 HH PPS REVENUE DEBIT

## 2021-12-29 NOTE — HOME HEALTH
Skilled reason for visit: assessment, teaching, vitals, wound care    Caregiver involvement: no cg present, pt lives alone. Medications reviewed and all medications are available in the home this visit. The following education was provided regarding medications, medication interactions, and look alike medications (specify): all meds reviewed with patient including purpose of each, how and when to take. Medications  are effective at this time. Home health supplies by type and quantity ordered/delivered this visit include: has supplies in home     Patient education provided this visit: per care plan. Home safety and fall prevention. Infection control, hand washing and hygeine. Importance of med compliance and MD follow up. S/S infection to watch for and report- increase drainage, redness around incision site, increase pain, fever, foul smelling drainage. Nutrition and hydration for healing, elevation for reduction in edema    Patient level of understanding of education provided: pt verbalized understanding of teaching    Skilled Care Performed this visit: assessment, teaching, vitals, wound care    Patient response to procedure performed:  pt tolerated well with no c/o pain during visit, see wound addendum for wound details. Agency Progress toward goals: progressing    Patient's Progress towards personal goals: progressing    Home exercise program: walking hourly. HEP for breathing/incontinence exerces provided/reviewed with admit handbook. Using IS every hour while awake.     Continued need for the following skills: Nursing    Plan for next visit: assessment, teaching, vitals, wound care    Patient and/or caregiver notified and agrees to changes in the Plan of Care N/A      The following discharge planning was discussed with the pt/caregiver: Pt will be dc when goals met, teaching complete, pt is knowledgable regarding medications and disease process and managment, pt or caregiver will be independent with wound care, and wound is healed or healing with no s/s infection

## 2021-12-30 ENCOUNTER — HOME CARE VISIT (OUTPATIENT)
Dept: SCHEDULING | Facility: HOME HEALTH | Age: 86
End: 2021-12-30
Payer: MEDICARE

## 2021-12-30 VITALS
RESPIRATION RATE: 17 BRPM | TEMPERATURE: 97.6 F | HEART RATE: 72 BPM | SYSTOLIC BLOOD PRESSURE: 120 MMHG | DIASTOLIC BLOOD PRESSURE: 80 MMHG | OXYGEN SATURATION: 97 %

## 2021-12-30 VITALS
RESPIRATION RATE: 18 BRPM | HEART RATE: 77 BPM | TEMPERATURE: 98 F | SYSTOLIC BLOOD PRESSURE: 134 MMHG | OXYGEN SATURATION: 98 % | DIASTOLIC BLOOD PRESSURE: 70 MMHG

## 2021-12-30 PROCEDURE — G0299 HHS/HOSPICE OF RN EA 15 MIN: HCPCS

## 2021-12-30 PROCEDURE — G0151 HHCP-SERV OF PT,EA 15 MIN: HCPCS

## 2021-12-30 PROCEDURE — 3331090001 HH PPS REVENUE CREDIT

## 2021-12-30 PROCEDURE — 3331090002 HH PPS REVENUE DEBIT

## 2021-12-30 NOTE — HOME HEALTH
SUBJECTIVE no complaint noted. Patient was pleased with PT, her progress and care received   CAREGIVER ASSISTANCE:family will be able to assist as needed   [address]: no changes in medications at this time  WOUND refer to SN for wound measurements. Patient has intact dessing on L leg that SN changed on 12/30/21   ROM wfl  BED MOBILITY Mod I  TRANSFERS Mod I using RW   GAIT TRAINING >100 feet with RW with Mod I   STAIRS Supervision with stair negotation   THERAPEUTIC EXERCISES independent x 1 set of 10 daily   BALANCE Tinetti 22/28   PATIENT/CAREGIVER EDUCATION THIS VISIT: fall prevention, safety, need for assistance as needed for transfers and gait  ASSESSMENT: Per PTA note dated 12/28/21: progress of patient as follows:      Transfers: Mod I    Balance: Tinetti 22/28    Strength: needs MMT    Gait: >100 feet outdoors & stairs included    Stairs: down/up 3 front steps w BUE on 1 handrail w lateral step-to gait    HEP:     Standing HEP for BLE x 10ea: Hip abd, hip ext, hip flexion, HS curls, minisquats, quarter squats, HR/TR w MC/VC for correct form and upright posture w TA draw. Sitting ther ex for HEP for ELÍAS LEs x 10 ea:  LAQ w 5 sec hold, HS curls, Marches, Hip ADD/ABD, HR/TR, and unsupported sit w MC/VC for upright posture w thoracic ext wo chin or shoulder girdle lift. Supine HEP for ELÍAS LE x 10ea ea: SAQ, SLR, Hip ABD/ADD, Heel slides, ankle pumps, and bridges.     Sitting core stability & dynamic sitting balance w arms across chest: leaning forward<>backward and side<>side lean w return to upright w 1 sec hold x 10 ea; Elías trunk rotations x 10ea    Dynamic balance and hip girdle strengthening:  sidestepping along 6 feet of counter x 1-5 laps w 2 UE support w MC/VC for correct form; step-overs w 1 UE support A<>P, w right foot then left as the stepping foot, and ELÍAS UE support R<>L x 10-30 each; and retro/tandem walking along 6 feet of counter x 1-5 laps w 1 UE support    PLAN DC at this time   DISCHARGE PLANNING DISCUSSED: dc to self and family under MD supervision

## 2021-12-30 NOTE — HOME HEALTH
Skilled reason for visit: assessment, teaching, vitals, wound care   Caregiver involvement:paid caregiver available in the morning. Medications reviewed and all medications are available in the home this visit. The following education was provided regarding medications, medication interactions, and look alike medications (specify): all meds reviewed with patient including purpose of each, how and when to take. Medications are effective at this time. Home health supplies by type and quantity ordered/delivered this visit include: has supplies in home   Patient education provided this visit:  Instructed in materials used in wound care. However, even with proper treatment, a wound infection may occur. Check the wound daily for signs of infection like increased drainage or bleeding from the wound that wont stop with direct pressure, redness in or around the wound, foul odor or pus coming from the wound, increased swelling around the wound and ever above 101.0°F or shaking chills. Patient level of understanding of education provided: pt verbalized understanding of teaching   Skilled Care Performed this visit: assessment, teaching, vitals, wound care   Patient response to procedure performed: pt tolerated well with no c/o pain during visit, see wound addendum for wound details. Agency Progress toward goals: progressing   Patient's Progress towards personal goals: progressing   Home exercise program: Sn instructed patient on pursed lip breathing. Pursed lip breathing is one of the simplest ways to control shortness of breath. It provides a quick and easy way to slow your pace of breathing, making each breath more effective.  Pursed lip breathing: Improves ventilation, releases trapped air in the lungs, keeps the airways open longer and decreases the work of breathing, prolongs exhalation to slow the breathing rate, improves breathing patterns by moving old air out of the lungs and allowing for new air to enter the lungs, relieves shortness of breath, causes general relaxation. Practice this technique 4 - 5 times a day at first so you can get the correct breathing pattern. Pursed lip breathing technique: Relax your neck and shoulder muscles, breathe in ( inhale ) slowly through your nose for two counts, keeping your mouth closed. Don't take a deep breath; a normal breath will do. It may help to count to yourself: inhale, one, two. Pucker or purse your lips as if you were going to whistle or gently flicker the flame of a candle. Breathe out ( exhale ) slowly and gently through your pursed lips while counting to four. It may help to count to yourself: exhale, one, two, three, four.   Continued need for the following skills: Nursing   Plan for next visit: assessment, teaching, vitals, wound care   Patient and/or caregiver notified and agrees to changes in the Plan of Care N/A   The following discharge planning was discussed with the pt/caregiver: Pt will be dc when goals met, teaching complete, pt is knowledgable regarding medications and disease process and managment, pt or caregiver will be independent with wound care, and wound is healed or healing with no s/s infection

## 2021-12-31 PROCEDURE — 3331090001 HH PPS REVENUE CREDIT

## 2021-12-31 PROCEDURE — 3331090002 HH PPS REVENUE DEBIT

## 2022-01-01 PROCEDURE — 3331090002 HH PPS REVENUE DEBIT

## 2022-01-01 PROCEDURE — 3331090001 HH PPS REVENUE CREDIT

## 2022-01-02 PROCEDURE — 3331090001 HH PPS REVENUE CREDIT

## 2022-01-02 PROCEDURE — 3331090002 HH PPS REVENUE DEBIT

## 2022-01-03 ENCOUNTER — HOME CARE VISIT (OUTPATIENT)
Dept: SCHEDULING | Facility: HOME HEALTH | Age: 87
End: 2022-01-03
Payer: MEDICARE

## 2022-01-03 VITALS
HEART RATE: 70 BPM | DIASTOLIC BLOOD PRESSURE: 76 MMHG | OXYGEN SATURATION: 99 % | RESPIRATION RATE: 18 BRPM | SYSTOLIC BLOOD PRESSURE: 130 MMHG | TEMPERATURE: 98.1 F

## 2022-01-03 PROCEDURE — 3331090001 HH PPS REVENUE CREDIT

## 2022-01-03 PROCEDURE — G0299 HHS/HOSPICE OF RN EA 15 MIN: HCPCS

## 2022-01-03 PROCEDURE — 3331090002 HH PPS REVENUE DEBIT

## 2022-01-03 NOTE — HOME HEALTH
Skilled reason for visit: skilled assessment, education, medication management      Caregiver involvement:  Family assists ADL's, medications, meals, transportation, errands      Medications reviewed and all medications are available in the home this visit. The following education was provided regarding medications, medication interactions, and look alike medications (specify): Drug-Drug: colchicine and atorvastatin. Concurrent use of the statin drugs and colchicine may increase the risk of myopathy or rhabdomyolysis, which is characterized by progressive muscle weakness and pain in the presence of a normal neurological exam.     Medications  are effective at this time. Home health supplies by type and quantity ordered/delivered this visit include: na    Patient education provided this visit: Skilled nurse instructed patient on safety measures to avoid injuries and falls such as keeping adequate lighting during the day and night and was educated on proper use of assistive device to prevent falls. Patient level of understanding of education provided: patient verbalized understanding    Skilled Care Performed this visit: skilled assessment, education, medication management, wound care    Patient response to procedure performed:  verbalized no pain or discomfort    Agency Progress toward goals: progressing    Patient's Progress towards personal goals: progressing    Home exercise program: Sn instructed patient on pursed lip breathing. Pursed lip breathing is one of the simplest ways to control shortness of breath. It provides a quick and easy way to slow your pace of breathing, making each breath more effective.  Pursed lip breathing: Improves ventilation, releases trapped air in the lungs, keeps the airways open longer and decreases the work of breathing, prolongs exhalation to slow the breathing rate, improves breathing patterns by moving old air out of the lungs and allowing for new air to enter the lungs, relieves shortness of breath, causes general relaxation. Practice this technique 4 - 5 times a day at first so you can get the correct breathing pattern. Pursed lip breathing technique: Relax your neck and shoulder muscles, breathe in ( inhale ) slowly through your nose for two counts, keeping your mouth closed. Don't take a deep breath; a normal breath will do. It may help to count to yourself: inhale, one, two. Pucker or purse your lips as if you were going to whistle or gently flicker the flame of a candle. Breathe out ( exhale ) slowly and gently through your pursed lips while counting to four. It may help to count to yourself: exhale, one, two, three, four.       Continued need for the following skills: Nursing    Plan for next visit: skilled assessment, education, medication management, wound care    Patient and/or caregiver notified and agrees to changes in the Plan of Care N/A      The following discharge planning was discussed with the pt/caregiver:  Discharge planning as follows: when goals met, patient/caregiver able to manage disease process, medications and pain

## 2022-01-04 PROCEDURE — 3331090001 HH PPS REVENUE CREDIT

## 2022-01-04 PROCEDURE — 3331090002 HH PPS REVENUE DEBIT

## 2022-01-05 PROCEDURE — 3331090002 HH PPS REVENUE DEBIT

## 2022-01-05 PROCEDURE — 3331090001 HH PPS REVENUE CREDIT

## 2022-01-06 ENCOUNTER — HOME CARE VISIT (OUTPATIENT)
Dept: SCHEDULING | Facility: HOME HEALTH | Age: 87
End: 2022-01-06
Payer: MEDICARE

## 2022-01-06 VITALS
OXYGEN SATURATION: 97 % | TEMPERATURE: 97.9 F | RESPIRATION RATE: 18 BRPM | SYSTOLIC BLOOD PRESSURE: 124 MMHG | HEART RATE: 71 BPM | DIASTOLIC BLOOD PRESSURE: 76 MMHG

## 2022-01-06 PROCEDURE — 3331090002 HH PPS REVENUE DEBIT

## 2022-01-06 PROCEDURE — G0299 HHS/HOSPICE OF RN EA 15 MIN: HCPCS

## 2022-01-06 PROCEDURE — 3331090001 HH PPS REVENUE CREDIT

## 2022-01-07 PROCEDURE — 3331090002 HH PPS REVENUE DEBIT

## 2022-01-07 PROCEDURE — 3331090001 HH PPS REVENUE CREDIT

## 2022-01-08 PROCEDURE — 3331090002 HH PPS REVENUE DEBIT

## 2022-01-08 PROCEDURE — 3331090001 HH PPS REVENUE CREDIT

## 2022-01-09 PROCEDURE — 3331090002 HH PPS REVENUE DEBIT

## 2022-01-09 PROCEDURE — 3331090001 HH PPS REVENUE CREDIT

## 2022-01-10 ENCOUNTER — HOME CARE VISIT (OUTPATIENT)
Dept: SCHEDULING | Facility: HOME HEALTH | Age: 87
End: 2022-01-10
Payer: MEDICARE

## 2022-01-10 VITALS
HEART RATE: 71 BPM | OXYGEN SATURATION: 7 % | RESPIRATION RATE: 18 BRPM | DIASTOLIC BLOOD PRESSURE: 72 MMHG | TEMPERATURE: 97.9 F | SYSTOLIC BLOOD PRESSURE: 126 MMHG

## 2022-01-10 PROCEDURE — 3331090001 HH PPS REVENUE CREDIT

## 2022-01-10 PROCEDURE — G0299 HHS/HOSPICE OF RN EA 15 MIN: HCPCS

## 2022-01-10 PROCEDURE — 3331090002 HH PPS REVENUE DEBIT

## 2022-01-11 PROCEDURE — 3331090001 HH PPS REVENUE CREDIT

## 2022-01-11 PROCEDURE — 3331090002 HH PPS REVENUE DEBIT

## 2022-01-12 ENCOUNTER — HOME CARE VISIT (OUTPATIENT)
Dept: SCHEDULING | Facility: HOME HEALTH | Age: 87
End: 2022-01-12
Payer: MEDICARE

## 2022-01-12 VITALS
SYSTOLIC BLOOD PRESSURE: 130 MMHG | TEMPERATURE: 98.1 F | RESPIRATION RATE: 18 BRPM | DIASTOLIC BLOOD PRESSURE: 74 MMHG | HEART RATE: 76 BPM | OXYGEN SATURATION: 98 %

## 2022-01-12 PROCEDURE — G0299 HHS/HOSPICE OF RN EA 15 MIN: HCPCS

## 2022-01-12 PROCEDURE — 3331090002 HH PPS REVENUE DEBIT

## 2022-01-12 PROCEDURE — 3331090001 HH PPS REVENUE CREDIT

## 2022-01-13 PROCEDURE — 3331090001 HH PPS REVENUE CREDIT

## 2022-01-13 PROCEDURE — 400018 HH-NO PAY CLAIM PROCEDURE

## 2022-01-13 PROCEDURE — 3331090002 HH PPS REVENUE DEBIT

## 2022-01-14 PROCEDURE — 3331090002 HH PPS REVENUE DEBIT

## 2022-01-14 PROCEDURE — 3331090001 HH PPS REVENUE CREDIT

## 2022-01-15 PROCEDURE — 3331090001 HH PPS REVENUE CREDIT

## 2022-01-15 PROCEDURE — 3331090002 HH PPS REVENUE DEBIT

## 2022-01-16 PROCEDURE — 3331090002 HH PPS REVENUE DEBIT

## 2022-01-16 PROCEDURE — 3331090001 HH PPS REVENUE CREDIT

## 2022-01-17 ENCOUNTER — HOME CARE VISIT (OUTPATIENT)
Dept: SCHEDULING | Facility: HOME HEALTH | Age: 87
End: 2022-01-17
Payer: MEDICARE

## 2022-01-17 VITALS
HEART RATE: 82 BPM | RESPIRATION RATE: 20 BRPM | TEMPERATURE: 97.5 F | OXYGEN SATURATION: 98 % | DIASTOLIC BLOOD PRESSURE: 66 MMHG | SYSTOLIC BLOOD PRESSURE: 130 MMHG

## 2022-01-17 PROCEDURE — 3331090001 HH PPS REVENUE CREDIT

## 2022-01-17 PROCEDURE — 400014 HH F/U

## 2022-01-17 PROCEDURE — 3331090002 HH PPS REVENUE DEBIT

## 2022-01-17 PROCEDURE — G0299 HHS/HOSPICE OF RN EA 15 MIN: HCPCS

## 2022-01-18 PROCEDURE — 3331090002 HH PPS REVENUE DEBIT

## 2022-01-18 PROCEDURE — 3331090001 HH PPS REVENUE CREDIT

## 2022-01-19 PROCEDURE — 3331090002 HH PPS REVENUE DEBIT

## 2022-01-19 PROCEDURE — 3331090001 HH PPS REVENUE CREDIT

## 2022-01-20 ENCOUNTER — HOME CARE VISIT (OUTPATIENT)
Dept: SCHEDULING | Facility: HOME HEALTH | Age: 87
End: 2022-01-20
Payer: MEDICARE

## 2022-01-20 PROCEDURE — 3331090001 HH PPS REVENUE CREDIT

## 2022-01-20 PROCEDURE — G0299 HHS/HOSPICE OF RN EA 15 MIN: HCPCS

## 2022-01-20 PROCEDURE — 3331090002 HH PPS REVENUE DEBIT

## 2022-01-21 VITALS
DIASTOLIC BLOOD PRESSURE: 82 MMHG | SYSTOLIC BLOOD PRESSURE: 136 MMHG | TEMPERATURE: 97.5 F | OXYGEN SATURATION: 98 % | RESPIRATION RATE: 17 BRPM | HEART RATE: 74 BPM

## 2022-01-21 PROCEDURE — 3331090002 HH PPS REVENUE DEBIT

## 2022-01-21 PROCEDURE — 3331090001 HH PPS REVENUE CREDIT

## 2022-01-21 NOTE — HOME HEALTH
Skilled reason for visit: assessment, teaching, vitals, wound care   Caregiver involvement:paid caregiver available in the morning. Medications reviewed and all medications are available in the home this visit. The following education was provided regarding medications, medication interactions, and look alike medications (specify): all meds reviewed with patient including purpose of each, how and when to take. Medications are effective at this time. Home health supplies by type and quantity ordered/delivered this visit include: has supplies in home   Patient education provided this visit: Instructed in materials used in wound care. However, even with proper treatment, a wound infection may occur. Check the wound daily for signs of infection like increased drainage or bleeding from the wound that wont stop with direct pressure, redness in or around the wound, foul odor or pus coming from the wound, increased swelling around the wound and ever above 101.0°F or shaking chills. Patient level of understanding of education provided: pt verbalized understanding of teaching   Skilled Care Performed this visit: assessment, teaching, vitals, wound care   Patient response to procedure performed: pt tolerated well with no c/o pain during visit, see wound addendum for wound details. Agency Progress toward goals: progressing   Patient's Progress towards personal goals: progressing   Home exercise program: Sn instructed patient on pursed lip breathing. Pursed lip breathing is one of the simplest ways to control shortness of breath. It provides a quick and easy way to slow your pace of breathing, making each breath more effective.  Pursed lip breathing: Improves ventilation, releases trapped air in the lungs, keeps the airways open longer and decreases the work of breathing, prolongs exhalation to slow the breathing rate, improves breathing patterns by moving old air out of the lungs and allowing for new air to enter the lungs, relieves shortness of breath, causes general relaxation. Practice this technique 4 - 5 times a day at first so you can get the correct breathing pattern. Pursed lip breathing technique: Relax your neck and shoulder muscles, breathe in ( inhale ) slowly through your nose for two counts, keeping your mouth closed. Don't take a deep breath; a normal breath will do. It may help to count to yourself: inhale, one, two. Pucker or purse your lips as if you were going to whistle or gently flicker the flame of a candle. Breathe out ( exhale ) slowly and gently through your pursed lips while counting to four. It may help to count to yourself: exhale, one, two, three, four. Continued need for the following skills: Nursing   Plan for next visit: assessment, teaching, vitals, wound care   Patient and/or caregiver notified and agrees to changes in the Plan of Care N/A   The following discharge planning was discussed with the pt/caregiver: Pt will be dc when goals met, teaching complete, pt is knowledgable regarding medications and disease process and managment, pt or caregiver will be independent with wound care, and wound is healed or healing with no s/s infection  Emergency Preparedness: Patient/Caregiver Instructed in the following:    Have one gallon of water per person for at least 3 days on hand. Have non-perishable food for at least 3 days that do not need to be cooked. Have flashlights and batteries  Charge your cell phones & any back up lithium batteries for your cell phones. Have 3+ days of back up oxygen in your home if applicable. Have a phone in your home that is hard wired & does not require power. Have medication for a week in your home. Make sure you have a caregiver in the home to provide care in case your home health/hospice nurse cannot get to your house.   Make sure you have all of your paperwork, I.e. id, insurance cards, DME phone number, DR & pharmacy phone numbers, agency phone number, medication in place for easy access & in zip lock bag for protection. Call agency if you relocate so we can contact you.

## 2022-01-22 PROCEDURE — 3331090002 HH PPS REVENUE DEBIT

## 2022-01-22 PROCEDURE — 3331090001 HH PPS REVENUE CREDIT

## 2022-01-23 PROCEDURE — 3331090002 HH PPS REVENUE DEBIT

## 2022-01-23 PROCEDURE — 3331090001 HH PPS REVENUE CREDIT

## 2022-01-24 ENCOUNTER — HOME CARE VISIT (OUTPATIENT)
Dept: SCHEDULING | Facility: HOME HEALTH | Age: 87
End: 2022-01-24
Payer: MEDICARE

## 2022-01-24 PROCEDURE — 3331090001 HH PPS REVENUE CREDIT

## 2022-01-24 PROCEDURE — 3331090002 HH PPS REVENUE DEBIT

## 2022-01-24 PROCEDURE — G0299 HHS/HOSPICE OF RN EA 15 MIN: HCPCS

## 2022-01-25 VITALS
DIASTOLIC BLOOD PRESSURE: 70 MMHG | OXYGEN SATURATION: 96 % | SYSTOLIC BLOOD PRESSURE: 124 MMHG | HEART RATE: 74 BPM | RESPIRATION RATE: 18 BRPM | TEMPERATURE: 98 F

## 2022-01-25 PROCEDURE — 3331090002 HH PPS REVENUE DEBIT

## 2022-01-25 PROCEDURE — 3331090001 HH PPS REVENUE CREDIT

## 2022-01-25 NOTE — HOME HEALTH
Skilled reason for visit: skilled assessment, education, medication management, wound care    Caregiver involvement:  Family and apid caregiver assists ADL's, medications, meals, transportation, errands. Medications reviewed and all medications are available in the home this visit. The following education was provided regarding medications, medication interactions, and look alike medications (specify): Drug-Drug: ibuprofen, losartan and losartan-hydroCHLOROthiazide. Concurrent use of ARBs with NSAIDs may result in decreased antihypertensive effects. In patients with existing renal impairment, the use of these agents together may also result in further deterioration of renal clearance caused by renal hypoperfusion. Medications  are effective at this time. Home health supplies by type and quantity ordered/delivered this visit include: na    Patient education provided this visit: Skilled nurse instructed patient on safety measures to avoid injuries and falls such as keeping adequate lighting during the day and night and was educated on proper use of assistive device to prevent falls. Instructed in materials used in wound care. However, even with proper treatment, a wound infection may occur. Check the wound daily for signs of infection like increased drainage or bleeding from the wound that wont stop with direct pressure, redness in or around the wound, foul odor or pus coming from the wound, increased swelling around the wound and ever above 101.0°F or shaking chills    Sharps education provided: na    Patient level of understanding of education provided: patient/caregiver verbalized 100% understanding.     Skilled Care Performed this visit: skilled assessment, education, medication management, wound care    Patient response to procedure performed:  patient denied pain and discomfort during procedure/visit    Agency Progress toward goals: progressing    Patient's Progress towards personal goals: progressing    Home exercise program: deep breath 5 times every hour to promote lung expansion    Continued need for the following skills: Nursing    Plan for next visit: skilled assessment, education, medication management, wound care    Patient and/or caregiver notified and agrees to changes in the Plan of Care N/A      The following discharge planning was discussed with the pt/caregiver: when incision healed, pain controlled and family independent with medications

## 2022-01-26 ENCOUNTER — HOME CARE VISIT (OUTPATIENT)
Dept: SCHEDULING | Facility: HOME HEALTH | Age: 87
End: 2022-01-26
Payer: MEDICARE

## 2022-01-26 PROCEDURE — 3331090002 HH PPS REVENUE DEBIT

## 2022-01-26 PROCEDURE — 3331090001 HH PPS REVENUE CREDIT

## 2022-01-26 PROCEDURE — G0300 HHS/HOSPICE OF LPN EA 15 MIN: HCPCS

## 2022-01-27 VITALS
RESPIRATION RATE: 18 BRPM | OXYGEN SATURATION: 98 % | HEART RATE: 61 BPM | SYSTOLIC BLOOD PRESSURE: 116 MMHG | DIASTOLIC BLOOD PRESSURE: 72 MMHG

## 2022-01-27 PROCEDURE — 3331090001 HH PPS REVENUE CREDIT

## 2022-01-27 PROCEDURE — 3331090002 HH PPS REVENUE DEBIT

## 2022-01-27 NOTE — HOME HEALTH
Skilled reason for visit: wound [address] 43 NEXT DOOR TO DAUGHTER   skilled assessment, education, medication management, Caregiver involvement: DAUGHTER assists ADL's, medications, meals, transportation, errands. Medications reviewed and all medications are available in the home this visit. The following education was provided regarding medications, medication interactions, and look alike medications (specify): Drug-Drug: losartan for hypertensive effects. Medications are effective at this time. Home health supplies by type and quantity ordered/delivered this visit include: na Patient education provided this visit: Skilled nurse instructed patient fsll prevention. Instructed in materials used in wound care. . Check the wound daily for signs of infection like increased drainage or bleeding from the wound that wont stop with direct pressure, redness in or around the wound, foul odor or pus coming from the wound, increased swelling around the wound and ever above 101.0°F or shaking chills Sharps education provided: na Patient level of understanding of education provided: patient/caregiver verbalized 100% understanding.  Skilled Care Performed this visit: skilled assessment, education, medication management, wound care Patient response to procedure performed: patient denied pain and discomfort during procedure/visit Agency Progress toward goals: progressing Patient's Progress towards personal goals: progressing Home exercise program: deep breath 5 times every hour to promote lung expansion Continued need for the following skills: Nursing Plan for next visit: skilled assessment, education, medication management, wound care Patient and/or caregiver notified and agrees to changes in the Plan of Care N/A The following discharge planning was discussed with the pt/caregiver: when incision healed, pain controlled and family independent with medications

## 2022-01-28 PROCEDURE — 3331090002 HH PPS REVENUE DEBIT

## 2022-01-28 PROCEDURE — 3331090001 HH PPS REVENUE CREDIT

## 2022-01-29 PROCEDURE — 3331090002 HH PPS REVENUE DEBIT

## 2022-01-29 PROCEDURE — 3331090001 HH PPS REVENUE CREDIT

## 2022-01-30 PROCEDURE — 3331090001 HH PPS REVENUE CREDIT

## 2022-01-30 PROCEDURE — 3331090002 HH PPS REVENUE DEBIT

## 2022-01-31 ENCOUNTER — HOME CARE VISIT (OUTPATIENT)
Dept: SCHEDULING | Facility: HOME HEALTH | Age: 87
End: 2022-01-31
Payer: MEDICARE

## 2022-01-31 VITALS
TEMPERATURE: 98.1 F | DIASTOLIC BLOOD PRESSURE: 70 MMHG | HEART RATE: 70 BPM | SYSTOLIC BLOOD PRESSURE: 120 MMHG | OXYGEN SATURATION: 98 % | RESPIRATION RATE: 18 BRPM

## 2022-01-31 PROCEDURE — 3331090001 HH PPS REVENUE CREDIT

## 2022-01-31 PROCEDURE — G0299 HHS/HOSPICE OF RN EA 15 MIN: HCPCS

## 2022-01-31 PROCEDURE — 3331090002 HH PPS REVENUE DEBIT

## 2022-01-31 NOTE — HOME HEALTH
Skilled reason for visit: skilled assessment, education, medication management, wound care   Caregiver involvement: Family and apid caregiver assists ADL's, medications, meals, transportation, errands. Medications reviewed and all medications are available in the home this visit. The following education was provided regarding medications, medication interactions, and look alike medications (specify): Drug-Drug: colchicine and atorvastatin Concurrent use of the statin drugs and colchicine may increase the risk of myopathy or rhabdomyolysis, which is characterized by progressive muscle weakness and pain in the presence of a normal neurological exam  Medications are effective at this time. Home health supplies by type and quantity ordered/delivered this visit include: na   Patient education provided this visit:  Skilled nurse instructed patient on safety measures to avoid injuries and falls such as keeping adequate lighting during the day and night and was educated on proper use of assistive device to prevent falls. Sharps education provided: na   Patient level of understanding of education provided: patient/caregiver verbalized 100% understanding. Skilled Care Performed this visit: skilled assessment, education, medication management, wound care   Patient response to procedure performed: patient denied pain and discomfort during procedure/visit   Agency Progress toward goals: progressing   Patient's Progress towards personal goals: progressing   Home exercise program: Sn instructed patient on pursed lip breathing. Pursed lip breathing is one of the simplest ways to control shortness of breath. It provides a quick and easy way to slow your pace of breathing, making each breath more effective.  Pursed lip breathing: Improves ventilation, releases trapped air in the lungs, keeps the airways open longer and decreases the work of breathing, prolongs exhalation to slow the breathing rate, improves breathing patterns by moving old air out of the lungs and allowing for new air to enter the lungs, relieves shortness of breath, causes general relaxation. Practice this technique 4 - 5 times a day at first so you can get the correct breathing pattern. Pursed lip breathing technique: Relax your neck and shoulder muscles, breathe in ( inhale ) slowly through your nose for two counts, keeping your mouth closed. Don't take a deep breath; a normal breath will do. It may help to count to yourself: inhale, one, two. Pucker or purse your lips as if you were going to whistle or gently flicker the flame of a candle. Breathe out ( exhale ) slowly and gently through your pursed lips while counting to four. It may help to count to yourself: exhale, one, two, three, four.   Continued need for the following skills: Nursing   Plan for next visit: skilled assessment, education, medication management, discharge   Patient and/or caregiver notified and agrees to changes in the Plan of Care N/A   The following discharge planning was discussed with the pt/caregiver: when incision healed, pain controlled and family independent with medications

## 2022-02-01 PROCEDURE — 3331090002 HH PPS REVENUE DEBIT

## 2022-02-01 PROCEDURE — 3331090001 HH PPS REVENUE CREDIT

## 2022-02-02 PROCEDURE — 3331090002 HH PPS REVENUE DEBIT

## 2022-02-02 PROCEDURE — 3331090001 HH PPS REVENUE CREDIT

## 2022-02-03 ENCOUNTER — HOME CARE VISIT (OUTPATIENT)
Dept: SCHEDULING | Facility: HOME HEALTH | Age: 87
End: 2022-02-03
Payer: MEDICARE

## 2022-02-03 PROCEDURE — 3331090002 HH PPS REVENUE DEBIT

## 2022-02-03 PROCEDURE — G0299 HHS/HOSPICE OF RN EA 15 MIN: HCPCS

## 2022-02-03 PROCEDURE — 3331090001 HH PPS REVENUE CREDIT

## 2022-02-04 VITALS
HEART RATE: 71 BPM | TEMPERATURE: 97.7 F | RESPIRATION RATE: 18 BRPM | DIASTOLIC BLOOD PRESSURE: 70 MMHG | SYSTOLIC BLOOD PRESSURE: 132 MMHG | OXYGEN SATURATION: 96 %

## 2022-02-04 NOTE — HOME HEALTH
Reviewed with patient /PCG s/s of disease exacerbation that need to be reported to health care providers including steps on what to do in an event of an emergency. Medication pill box set up checked and emphasized the importance of timely refill of medications to prevent missing or skipping doses, pain management, continue following prescribed diet regimen. Re-instructed on infection control measures and practicing standard precautions, most importantly, frequent proper hand washing to prevent disease complications. Family assists ADL's, medications, meals, transportation, errands  Medications reconciled. MD informed of discharge   Skilled nurse instructed patient on safety measures to avoid injuries and falls such as keeping adequate lighting during the day and night and was educated on proper use of assistive device to prevent falls. patient/caregiver verbalized 100% understanding. patient denied pain and discomfort during procedure/visit  Pt.clinically discharged and documentation finalized for completion of agency discharge.

## 2022-03-02 ENCOUNTER — HOSPITAL ENCOUNTER (EMERGENCY)
Age: 87
Discharge: HOME | End: 2022-03-02
Attending: STUDENT IN AN ORGANIZED HEALTH CARE EDUCATION/TRAINING PROGRAM
Payer: MEDICARE

## 2022-03-02 ENCOUNTER — APPOINTMENT (OUTPATIENT)
Dept: VASCULAR SURGERY | Age: 87
End: 2022-03-02
Attending: NURSE PRACTITIONER
Payer: MEDICARE

## 2022-03-02 VITALS
DIASTOLIC BLOOD PRESSURE: 66 MMHG | SYSTOLIC BLOOD PRESSURE: 145 MMHG | HEIGHT: 66 IN | TEMPERATURE: 97.7 F | HEART RATE: 73 BPM | OXYGEN SATURATION: 98 % | BODY MASS INDEX: 29.89 KG/M2 | RESPIRATION RATE: 20 BRPM | WEIGHT: 186 LBS

## 2022-03-02 DIAGNOSIS — I82.442 ACUTE DEEP VEIN THROMBOSIS (DVT) OF LEFT TIBIAL VEIN (HCC): ICD-10-CM

## 2022-03-02 DIAGNOSIS — I82.412 ACUTE DEEP VEIN THROMBOSIS (DVT) OF LEFT FEMORAL VEIN (HCC): Primary | ICD-10-CM

## 2022-03-02 LAB
ANION GAP SERPL CALC-SCNC: 4 MMOL/L (ref 3–18)
BUN SERPL-MCNC: 16 MG/DL (ref 7–18)
BUN/CREAT SERPL: 20 (ref 12–20)
CALCIUM SERPL-MCNC: 9.3 MG/DL (ref 8.5–10.1)
CHLORIDE SERPL-SCNC: 108 MMOL/L (ref 100–111)
CO2 SERPL-SCNC: 28 MMOL/L (ref 21–32)
CREAT SERPL-MCNC: 0.79 MG/DL (ref 0.6–1.3)
GLUCOSE SERPL-MCNC: 98 MG/DL (ref 74–99)
POTASSIUM SERPL-SCNC: 4 MMOL/L (ref 3.5–5.5)
SODIUM SERPL-SCNC: 140 MMOL/L (ref 136–145)

## 2022-03-02 PROCEDURE — 80048 BASIC METABOLIC PNL TOTAL CA: CPT

## 2022-03-02 PROCEDURE — 93971 EXTREMITY STUDY: CPT

## 2022-03-02 PROCEDURE — 99284 EMERGENCY DEPT VISIT MOD MDM: CPT

## 2022-03-02 PROCEDURE — 74011250637 HC RX REV CODE- 250/637: Performed by: STUDENT IN AN ORGANIZED HEALTH CARE EDUCATION/TRAINING PROGRAM

## 2022-03-02 RX ADMIN — APIXABAN 10 MG: 5 TABLET, FILM COATED ORAL at 19:05

## 2022-03-02 NOTE — ED PROVIDER NOTES
EMERGENCY DEPARTMENT HISTORY AND PHYSICAL EXAM      Date: 3/2/2022  Patient Name: Miles Dickerson    History of Presenting Illness     Chief Complaint   Patient presents with    Leg Pain       History (Context): Miles Dickerson is a 80 y.o. female with a past medical history significant for hypertension and hyperlipidemia comes into the ED today due to left lower extremity pain and swelling. Patient symptoms began Sunday and have worsened since onset. She states symptoms are worsened with ambulation and improved at rest.  She admits to erythema and warmth to the left lower extremity distally. She denies any fever, chills, chest pain, dyspnea, abdominal pain, nausea or vomiting or diarrhea. Patient denies taking medication for treatment of her symptoms. Patient denies previous history of VTE or anticoagulation usage at this time. She states that she was seen by her PCP earlier today who sent her to the emergency department for evaluation of possible DVT. Patient describes her symptoms as mild in severity. PCP: Christy Mariscal MD    Current Outpatient Medications   Medication Sig Dispense Refill    cephALEXin (KEFLEX) 500 mg capsule Take 500 mg by mouth two (2) times a day.  mv-mn/folic ac/calcium/vit K1 (WOMEN'S 50 PLUS MULTIVITAMIN PO) Take 1 Tablet by mouth daily.  artificial tears,hypromellose, (Systane GeL) 0.3 % gel ophthalmic ointment Apply 1 Drop to eye four (4) times daily as needed for Other (irritation). apply 1 drop  over left eye q 4 hours prn irritation      calcium-cholecalciferol, d3, (Calcium 600 + D,3,) 600 mg calcium- 200 unit cap Take 600 mg by mouth daily.  losartan (COZAAR) 50 mg tablet Take 25 mg by mouth daily.  methocarbamol (ROBAXIN) 500 mg tablet Take 500 mg by mouth daily.  take at bedtime  Pt reports only taking it one time due to med causing dizziness; pt to discuss it w/ MD.  Indications: muscle spasm      calcitonin, salmon, (MIACALCIN) nasal 1 spray intranasal daily, Alternate nostrils. 1 Bottle 0    pantoprazole (PROTONIX) 20 mg tablet Take 20 mg by mouth daily. Indications: gastroesophageal reflux disease      ibuprofen (MOTRIN) 400 mg tablet Take 400 mg by mouth three (3) times daily.  allopurinol (ZYLOPRIM) 100 mg tablet Take 100 mg by mouth daily.  colchicine 0.6 mg tablet Take 0.6 mg by mouth daily.  nitrofurantoin (MACRODANTIN) 50 mg capsule Take 1 Cap by mouth every six (6) hours. (Patient not taking: Reported on 2/11/2020) 6 Cap 0    acetaminophen (TYLENOL) 325 mg tablet Take 2 Tabs by mouth every four (4) hours as needed for Pain. (Patient taking differently: Take 500 mg by mouth three (3) times daily.) 60 Tab 0    diclofenac (VOLTAREN) 1 % gel Apply 4 g to affected area four (4) times daily as needed for Pain. Indications: Osteoarthritis of the Knee 100 g 0    atorvastatin (LIPITOR) 20 mg tablet Take 20 mg by mouth Q TU, TH & SAT.  aspirin delayed-release 81 mg tablet Take 81 mg by mouth daily.  raloxifene (EVISTA) 60 mg tablet Take 60 mg by mouth daily.  losartan-hydrochlorothiazide (HYZAAR) 50-12.5 mg per tablet Take 1 Tab by mouth daily. Past History     Past Medical History:   Past Medical History:   Diagnosis Date    Essential hypertension     Hyperlipidemia     Other premature beats 5/13/2013     3.  11,437 single ve's, 16 paired. Bigeminy and trigeminy noted. Approximately 15% o pvc's on ekg. asymptomatic potassium normal check echo for lv function        Past Surgical History:  No past surgical history on file.     Family History:  Family History   Problem Relation Age of Onset    Arrhythmia Sister     Heart Attack Other     Heart Surgery Other        Social History:   Social History     Tobacco Use    Smoking status: Never Smoker    Smokeless tobacco: Not on file   Substance Use Topics    Alcohol use: No    Drug use: No       Allergies:  No Known Allergies    PMH, PSH, family history, social history, allergies reviewed with the patient with significant items noted above. Review of Systems   Review of Systems   Constitutional: Negative for chills and fever. HENT: Negative for sore throat. Eyes: Negative for visual disturbance. Respiratory: Negative for shortness of breath. Cardiovascular: Positive for leg swelling (LLE). Negative for chest pain. Gastrointestinal: Negative for abdominal pain, nausea and vomiting. Genitourinary: Negative for difficulty urinating. Musculoskeletal: Negative for myalgias. Skin: Positive for rash. Neurological: Negative for weakness, numbness and headaches. Physical Exam     Vitals:    03/02/22 1525   BP: (!) 145/66   Pulse: 73   Resp: 20   Temp: 97.7 °F (36.5 °C)   SpO2: 98%   Weight: 84.4 kg (186 lb)   Height: 5' 6\" (1.676 m)       Physical Exam  Vitals and nursing note reviewed. Constitutional:       General: She is not in acute distress. Appearance: Normal appearance. She is not ill-appearing or toxic-appearing. HENT:      Head: Normocephalic and atraumatic. Mouth/Throat:      Mouth: Mucous membranes are moist.   Eyes:      General: No scleral icterus. Conjunctiva/sclera: Conjunctivae normal.   Cardiovascular:      Rate and Rhythm: Normal rate and regular rhythm. Comments: Normal peripheral perfusion  Pulmonary:      Effort: Pulmonary effort is normal. No respiratory distress. Abdominal:      General: There is no distension. Palpations: Abdomen is soft. Tenderness: There is no abdominal tenderness. Musculoskeletal:         General: Swelling (To the left ankle region) and tenderness (To the left calf) present. No deformity. Normal range of motion. Cervical back: Normal range of motion and neck supple. Left lower leg: Edema (Trace edema to the left lower extremity proximally to mid tibia.) present. Skin:     General: Skin is warm and dry.       Findings: Bruising and erythema (To the left distal lower extremity) present. No rash. Neurological:      General: No focal deficit present. Mental Status: She is alert and oriented to person, place, and time. Mental status is at baseline. Psychiatric:         Mood and Affect: Mood normal.         Thought Content: Thought content normal.         Diagnostic Study Results     Labs -   No results found for this or any previous visit (from the past 12 hour(s)). Labs Reviewed - No data to display    Radiologic Studies -   DUPLEX LOWER EXT VENOUS LEFT    (Results Pending)     CT Results  (Last 48 hours)    None        CXR Results  (Last 48 hours)    None          The laboratory results, imaging results, and other diagnostic exams were reviewed in the EMR. Medical Decision Making   I am the first provider for this patient. I reviewed the vital signs, available nursing notes, past medical history, past surgical history, family history and social history. Vital Signs-Reviewed the patient's vital signs. Records Reviewed: Personally, on initial evaluation    MDM:   Izzy Mi presents with complaint of left lower extremity pain and swelling  DDX includes but is not limited to: Cellulitis, DVT, lower extremity pain    Patient overall appearing, no acute distress, and vital signs grossly within normal limits. Will obtain imaging for further evaluation of patients complaint. Will continue to monitor and evaluate patient while in the ED. Orders as below:  No orders of the defined types were placed in this encounter. ED Course:   ED Course as of 03/02/22 2134   Wed Mar 02, 2022   1851 Patient found to have an acute occlusive thrombus present in the left mid femoral vein, distal femoral vein, gastrocnemius vein, and 2 out of 2 posterior tibial veins. Due to patient having a occlusive thrombus will consult vascular surgery at this time.   Patient with appropriate distal pulses and without any skin changes suggestive of cerulea parul or alba dol. We will continue to monitor patient. Will provide patient with first dose of apixaban while here. [DV]   [id number] Vascular surgery who states patient appropriate for discharge. Will provide patient with first dose of Eliquis while here. Will discharge patient home with prescription for Eliquis. Patient will follow up with vascular surgery and PCP for evaluation. Gave patient and daughter strict return precautions. Patient and daughter verbalized understanding and are without any further questions. [DV]      ED Course User Index  [DV] Neto Hernandez DO           Procedures:  Procedures        Diagnosis and Disposition     CLINICAL IMPRESSION:  No diagnosis found. Current Discharge Medication List          Disposition: Home    Patient condition at time of disposition: Stable    DISCHARGE NOTE:   Pt has been reexamined. Patient has no new complaints, changes, or physical findings. Care plan outlined and precautions discussed. Results were reviewed with the patient. All medications were reviewed with the patient. All of pt's questions and concerns were addressed. Alarm symptoms and return precautions associated with chief complaint and evaluation were reviewed with the patient in detail. The patient demonstrated adequate understanding. Patient was instructed to follow up with PCP, Vascular surgery, as well as strict return precautions to the ED upon further deterioration. Patient is ready to go home. The patient is happy with this plan            Dragon Disclaimer     Please note that this dictation was completed with Mixbook, the computer voice recognition software. Quite often unanticipated grammatical, syntax, homophones, and other interpretive errors are inadvertently transcribed by the computer software. Please disregard these errors. Please excuse any errors that have escaped final proofreading. Hollie DUNAWAY.

## 2022-03-02 NOTE — ED TRIAGE NOTES
Client sent by PCP for evaluation for DVT to LLE. Having swelling, pain, and warm to touch x 3 days. Denies pain at this time. Worse with ambulation.

## 2022-03-04 ENCOUNTER — OFFICE VISIT (OUTPATIENT)
Dept: VASCULAR SURGERY | Age: 87
End: 2022-03-04
Payer: MEDICARE

## 2022-03-04 VITALS
DIASTOLIC BLOOD PRESSURE: 80 MMHG | RESPIRATION RATE: 20 BRPM | SYSTOLIC BLOOD PRESSURE: 138 MMHG | OXYGEN SATURATION: 95 % | HEART RATE: 77 BPM

## 2022-03-04 DIAGNOSIS — I87.2 VENOUS (PERIPHERAL) INSUFFICIENCY: Primary | ICD-10-CM

## 2022-03-04 PROCEDURE — G8536 NO DOC ELDER MAL SCRN: HCPCS | Performed by: PHYSICIAN ASSISTANT

## 2022-03-04 PROCEDURE — 1101F PT FALLS ASSESS-DOCD LE1/YR: CPT | Performed by: PHYSICIAN ASSISTANT

## 2022-03-04 PROCEDURE — 99204 OFFICE O/P NEW MOD 45 MIN: CPT | Performed by: PHYSICIAN ASSISTANT

## 2022-03-04 PROCEDURE — 1090F PRES/ABSN URINE INCON ASSESS: CPT | Performed by: PHYSICIAN ASSISTANT

## 2022-03-04 PROCEDURE — G8427 DOCREV CUR MEDS BY ELIG CLIN: HCPCS | Performed by: PHYSICIAN ASSISTANT

## 2022-03-04 PROCEDURE — G8432 DEP SCR NOT DOC, RNG: HCPCS | Performed by: PHYSICIAN ASSISTANT

## 2022-03-04 PROCEDURE — G8419 CALC BMI OUT NRM PARAM NOF/U: HCPCS | Performed by: PHYSICIAN ASSISTANT

## 2022-03-04 NOTE — PROGRESS NOTES
1. Have you been to an emergency room or urgent care clinic since your last visit? Patient presents in office today as a new patient. Hospitalized since your last visit? If yes, where, when, and reason for visit? Patient presents in office today as a new patient. 2. Have you seen or consulted any other health care providers outside of the [facility] since your last visit including any procedures, health maintenance items. If yes, where, when and reason for visit? Patient presents in office today as a new patient.               3 most recent PHQ Screens 3/4/2022   Little interest or pleasure in doing things Not at all   Feeling down, depressed, irritable, or hopeless Not at all   Total Score PHQ 2 0

## 2022-03-04 NOTE — PATIENT INSTRUCTIONS
Deep Vein Thrombosis: Care Instructions  Overview     A deep vein thrombosis (DVT) is a blood clot in certain veins, usually in the legs, pelvis, or arms. Blood clots in these veins need to be treated because they can get bigger, break loose, and travel through the bloodstream to the lungs. A blood clot in a lung can be life-threatening. The doctor may have given you a blood thinner (anticoagulant). A blood thinner can stop the blood clot from growing larger and prevent new clots from forming. You will need to take a blood thinner for at least 3 months. The doctor has checked you carefully, but problems can develop later. If you notice any problems or new symptoms, get medical treatment right away. Follow-up care is a key part of your treatment and safety. Be sure to make and go to all appointments, and call your doctor if you are having problems. It's also a good idea to know your test results and keep a list of the medicines you take. How can you care for yourself at home? · Take your medicines exactly as prescribed. Call your doctor if you think you are having a problem with your medicine. · If you are taking a blood thinner, be sure you get instructions about how to take your medicine safely. Blood thinners can cause serious bleeding problems. · Try to walk several times a day. · Wear compression stockings if your doctor recommends them. These stockings are tighter at the feet than on the legs. They may reduce pain and swelling in your legs. But there are different types of stockings, and they need to fit right. So your doctor will recommend what you need. · When you sit, use a pillow to raise the arm or leg that has the blood clot. Try to keep it above the level of your heart. When should you call for help? Call 911 anytime you think you may need emergency care.  For example, call if:    · You passed out (lost consciousness).     · You have symptoms of a blood clot in your lung (called a pulmonary embolism). These include:  ? Sudden chest pain. ? Trouble breathing. ? Coughing up blood. Call your doctor now or seek immediate medical care if:    · You have new or worse trouble breathing.     · You are dizzy or lightheaded, or you feel like you may faint.     · You have symptoms of a blood clot in your arm or leg. These may include:  ? Pain in the arm, calf, back of the knee, thigh, or groin. ? Redness and swelling in the arm, leg, or groin. Watch closely for changes in your health, and be sure to contact your doctor if:    · You do not get better as expected. Where can you learn more? Go to http://www.gray.com/  Enter P466 in the search box to learn more about \"Deep Vein Thrombosis: Care Instructions. \"  Current as of: July 6, 2021               Content Version: 13.0  © 2006-2021 [employer]. Care instructions adapted under license by [employer] (which disclaims liability or warranty for this information). If you have questions about a medical condition or this instruction, always ask your healthcare professional. [place] [address] any warranty or liability for your use of this information. Learning About Venous Insufficiency  What is it? Venous insufficiency is a problem with the flow of blood from the veins of the legs back to the heart. It's also called chronic venous insufficiency or chronic venous stasis. Your veins bring blood back to the heart after it flows through your body. Veins have valves that keep the blood moving in one directiontoward the heart. But with venous insufficiency, the veins of the legs might not work as they should. This can allow blood to leak backward. Fluid can pool in the legs. This can lead to problems that include varicose veins. What causes it? Venous insufficiency is sometimes caused by deep vein thrombosis and high blood pressure inside leg veins.   You are more likely to have venous insufficiency if you:  · Are older. · Are female. · Are overweight. · Don't get enough physical activity. · Smoke. · Have a family history of varicose veins. What are the symptoms? Symptoms of venous insufficiency affect the legs. They may include:  · Swelling, often in the ankles. · A rash. · Varicose veins. · Itching. · Cramping. · Skin sores (ulcers). · Aching or a feeling of heaviness. · Changes in skin color. How is it diagnosed? Your doctor can diagnose venous insufficiency by examining your legs and by using a type of ultrasound test (duplex Doppler) to find out how well blood is flowing in your legs. How is it treated? To reduce swelling and relieve pain caused by venous insufficiency, you can wear compression stockings. They are tighter at the ankles than at the top of the legs. They also can help venous skin ulcers heal. But there are different types of stockings, and they need to fit right. So your doctor will recommend what you need. You also can try to:  · Get more exercise, especially walking. It can increase blood flow. · Avoid standing still or sitting for a long time, which can make the fluid pool in your legs. · Try not to sit with your legs crossed at the knee. · Keep your legs raised above your heart when you're lying down. This reduces swelling. If these treatments don't work, you may need medicine or a procedure to help relieve symptoms. Procedures might be done to close the vein, to remove the vein, or to improve blood flow. Follow-up care is a key part of your treatment and safety. Be sure to make and go to all appointments, and call your doctor if you are having problems. It's also a good idea to know your test results and keep a list of the medicines you take. Current as of: July 6, 2021               Content Version: 13.0  © 2006-2021 [employer].    Care instructions adapted under license by [employer] (which disclaims liability or warranty for this information). If you have questions about a medical condition or this instruction, always ask your healthcare professional. [place] [address] any warranty or liability for your use of this information.

## 2022-03-19 PROBLEM — M62.82 RHABDOMYOLYSIS: Status: ACTIVE | Noted: 2019-04-01

## 2022-06-03 ENCOUNTER — OFFICE VISIT (OUTPATIENT)
Dept: VASCULAR SURGERY | Age: 87
End: 2022-06-03

## 2022-06-03 VITALS
DIASTOLIC BLOOD PRESSURE: 70 MMHG | HEIGHT: 66 IN | SYSTOLIC BLOOD PRESSURE: 110 MMHG | HEART RATE: 70 BPM | WEIGHT: 186.07 LBS | OXYGEN SATURATION: 97 % | BODY MASS INDEX: 29.9 KG/M2

## 2022-06-03 DIAGNOSIS — I82.513 CHRONIC BILATERAL DEEP VEIN THROMBOSIS (DVT) OF FEMORAL VEINS (HCC): ICD-10-CM

## 2022-06-03 DIAGNOSIS — I87.2 VENOUS (PERIPHERAL) INSUFFICIENCY: Primary | ICD-10-CM

## 2022-06-03 PROCEDURE — 1123F ACP DISCUSS/DSCN MKR DOCD: CPT | Performed by: PHYSICIAN ASSISTANT

## 2022-06-03 PROCEDURE — G8427 DOCREV CUR MEDS BY ELIG CLIN: HCPCS | Performed by: PHYSICIAN ASSISTANT

## 2022-06-03 PROCEDURE — 99214 OFFICE O/P EST MOD 30 MIN: CPT | Performed by: PHYSICIAN ASSISTANT

## 2022-06-03 PROCEDURE — G8432 DEP SCR NOT DOC, RNG: HCPCS | Performed by: PHYSICIAN ASSISTANT

## 2022-06-03 PROCEDURE — G8417 CALC BMI ABV UP PARAM F/U: HCPCS | Performed by: PHYSICIAN ASSISTANT

## 2022-06-03 PROCEDURE — 1090F PRES/ABSN URINE INCON ASSESS: CPT | Performed by: PHYSICIAN ASSISTANT

## 2022-06-03 PROCEDURE — G8536 NO DOC ELDER MAL SCRN: HCPCS | Performed by: PHYSICIAN ASSISTANT

## 2022-06-03 PROCEDURE — 1101F PT FALLS ASSESS-DOCD LE1/YR: CPT | Performed by: PHYSICIAN ASSISTANT

## 2022-06-03 NOTE — PATIENT INSTRUCTIONS
Learning About Venous Insufficiency  What is it? Venous insufficiency is a problem with the flow of blood from the veins of the legs back to the heart. It's also called chronic venous insufficiency or chronic venous stasis. Your veins bring blood back to the heart after it flows through your body. Veins have valves that keep the blood moving in one directiontoward the heart. But with venous insufficiency, the veins of the legs might not work as they should. This can allow blood to leak backward. Fluid can pool in the legs. This can lead to problems that include varicose veins. What causes it? Venous insufficiency is sometimes caused by deep vein thrombosis and high blood pressure inside leg veins. You are more likely to have venous insufficiency if you:  · Are older. · Are female. · Are overweight. · Don't get enough physical activity. · Smoke. · Have a family history of varicose veins. What are the symptoms? Symptoms of venous insufficiency affect the legs. They may include:  · Swelling, often in the ankles. · A rash. · Varicose veins. · Itching. · Cramping. · Skin sores (ulcers). · Aching or a feeling of heaviness. · Changes in skin color. How is it diagnosed? Your doctor can diagnose venous insufficiency by examining your legs and by using a type of ultrasound test (duplex Doppler) to find out how well blood is flowing in your legs. How is it treated? To reduce swelling and relieve pain caused by venous insufficiency, you can wear compression stockings. They are tighter at the ankles than at the top of the legs. They also can help venous skin ulcers heal. But there are different types of stockings, and they need to fit right. So your doctor will recommend what you need. You also can try to:  · Get more exercise, especially walking. It can increase blood flow. · Avoid standing still or sitting for a long time, which can make the fluid pool in your legs.   · Try not to sit with your legs crossed at the knee. · Keep your legs raised above your heart when you're lying down. This reduces swelling. If these treatments don't work, you may need medicine or a procedure to help relieve symptoms. Procedures might be done to close the vein, to remove the vein, or to improve blood flow. Follow-up care is a key part of your treatment and safety. Be sure to make and go to all appointments, and call your doctor if you are having problems. It's also a good idea to know your test results and keep a list of the medicines you take. Current as of: July 6, 2021               Content Version: 13.2  © 2006-2022 [employer]. Care instructions adapted under license by [employer] (which disclaims liability or warranty for this information). If you have questions about a medical condition or this instruction, always ask your healthcare professional. [place] [address] any warranty or liability for your use of this information. Deep Vein Thrombosis: Care Instructions  Overview     A deep vein thrombosis (DVT) is a blood clot in certain veins, usually in the legs, pelvis, or arms. Blood clots in these veins need to be treated because they can get bigger, break loose, and travel through the bloodstream to the lungs. A blood clot in a lung can be life-threatening. The doctor may have given you a blood thinner (anticoagulant). A blood thinner can stop the blood clot from growing larger and prevent new clots from forming. You will need to take a blood thinner for at least 3 months. The doctor has checked you carefully, but problems can develop later. If you notice any problems or new symptoms, get medical treatment right away. Follow-up care is a key part of your treatment and safety. Be sure to make and go to all appointments, and call your doctor if you are having problems. It's also a good idea to know your test results and keep a list of the medicines you take.   How can you care for yourself at home? · Take your medicines exactly as prescribed. Call your doctor if you think you are having a problem with your medicine. · If you are taking a blood thinner, be sure you get instructions about how to take your medicine safely. Blood thinners can cause serious bleeding problems. · Try to walk several times a day. · Wear compression stockings if your doctor recommends them. These stockings are tighter at the feet than on the legs. They may reduce pain and swelling in your legs. But there are different types of stockings, and they need to fit right. So your doctor will recommend what you need. · When you sit, use a pillow to raise the arm or leg that has the blood clot. Try to keep it above the level of your heart. When should you call for help? Call 911 anytime you think you may need emergency care. For example, call if:    · You passed out (lost consciousness).     · You have symptoms of a blood clot in your lung (called a pulmonary embolism). These include:  ? Sudden chest pain. ? Trouble breathing. ? Coughing up blood. Call your doctor now or seek immediate medical care if:    · You have new or worse trouble breathing.     · You are dizzy or lightheaded, or you feel like you may faint.     · You have symptoms of a blood clot in your arm or leg. These may include:  ? Pain in the arm, calf, back of the knee, thigh, or groin. ? Redness and swelling in the arm, leg, or groin. Watch closely for changes in your health, and be sure to contact your doctor if:    · You do not get better as expected. Where can you learn more? Go to http://www.gray.com/  Enter J283 in the search box to learn more about \"Deep Vein Thrombosis: Care Instructions. \"  Current as of: July 6, 2021               Content Version: 13.2  © 9037-3947 [employer].    Care instructions adapted under license by [employer] (which disclaims liability or warranty for this information). If you have questions about a medical condition or this instruction, always ask your healthcare professional. [place] [address] any warranty or liability for your use of this information.

## 2022-06-03 NOTE — PROGRESS NOTES
Noris Moody    Chief Complaint   Patient presents with    Follow-up       History and Physical    Luis Armando Arguello is a pleasant 51-year-old female who presents today for re-evaluation and follow-up for lower extremity swelling, fatigue tired legs, and inability to walk long distances due to heavy legs. Patient presents today with her son in law at her side. Permission was obtained to speak freely. Patient states that she continues to ambulate daily 2-3 blocks, with the assistance of a walker easily without any leg pain or discomfort but her legs get extremely fatigued tired and heavy by the end of the day. She does have some bilateral knee issues, arthritis, but states that her little more heavy fatigued and tired. She recently was in the hospital and got diagnosed with a locally itchy. Patient states that she had a laceration on her left shin and was well for a couple weeks which she feels caused the DVT. Patient was started on Eliquis and has continued to take the medicine without any bleeding or bruising issues. Patient states that the swelling which she was experiencing prior to starting her medication has dissipated. She reports that she has been compliant with wearing compression stockings and feels her legs are getting stronger. Denies any recent ulceration skin rashes or skin lesions on either lower extremity. Completed her antibiotics without any issues. No further redness in her lower extremity. She denies any chest pain or shortness of breath. Denies any dyspnea on exertion. Very good appetite with no nausea vomiting. Is any bowel or bladder issues. As stated above no open wounds lesions or rashes visit. Patients daughter stated that her mother ambulated from the parking lot to our office without any issues. Past Medical History:   Diagnosis Date    Essential hypertension     Hyperlipidemia     Other premature beats 5/13/2013     3.  11,437 single ve's, 16 paired.  Nathalie and trigeminy noted. Approximately 15% o pvc's on ekg. asymptomatic potassium normal check echo for lv function      Patient Active Problem List   Diagnosis Code    Other premature beats I49.49    Essential hypertension, benign I10    Other and unspecified hyperlipidemia E78.5    Rhabdomyolysis M62.82     History reviewed. No pertinent surgical history. Current Outpatient Medications   Medication Sig Dispense Refill    cephALEXin (KEFLEX) 500 mg capsule Take 500 mg by mouth two (2) times a day.  mv-mn/folic ac/calcium/vit K1 (WOMEN'S 50 PLUS MULTIVITAMIN PO) Take 1 Tablet by mouth daily.  artificial tears,hypromellose, (Systane GeL) 0.3 % gel ophthalmic ointment Apply 1 Drop to eye four (4) times daily as needed for Other (irritation). apply 1 drop  over left eye q 4 hours prn irritation      calcium-cholecalciferol, d3, (Calcium 600 + D,3,) 600 mg calcium- 200 unit cap Take 600 mg by mouth daily.  losartan (COZAAR) 50 mg tablet Take 25 mg by mouth daily.  methocarbamol (ROBAXIN) 500 mg tablet Take 500 mg by mouth daily. take at bedtime  Pt reports only taking it one time due to med causing dizziness; pt to discuss it w/ MD.  Indications: muscle spasm      calcitonin, salmon, (MIACALCIN) nasal 1 spray intranasal daily, Alternate nostrils. 1 Bottle 0    ibuprofen (MOTRIN) 400 mg tablet Take 400 mg by mouth three (3) times daily.  colchicine 0.6 mg tablet Take 0.6 mg by mouth daily.  nitrofurantoin (MACRODANTIN) 50 mg capsule Take 1 Cap by mouth every six (6) hours. 6 Cap 0    diclofenac (VOLTAREN) 1 % gel Apply 4 g to affected area four (4) times daily as needed for Pain. Indications: Osteoarthritis of the Knee 100 g 0    atorvastatin (LIPITOR) 20 mg tablet Take 20 mg by mouth Q TU, TH & SAT.  aspirin delayed-release 81 mg tablet Take 81 mg by mouth daily.  raloxifene (EVISTA) 60 mg tablet Take 60 mg by mouth daily.       losartan-hydrochlorothiazide (HYZAAR) 50-12.5 mg per tablet Take 1 Tab by mouth daily.  pantoprazole (PROTONIX) 20 mg tablet Take 20 mg by mouth daily. Indications: gastroesophageal reflux disease (Patient not taking: Reported on 6/3/2022)      allopurinol (ZYLOPRIM) 100 mg tablet Take 100 mg by mouth daily. (Patient not taking: Reported on 3/4/2022)      acetaminophen (TYLENOL) 325 mg tablet Take 2 Tabs by mouth every four (4) hours as needed for Pain. (Patient taking differently: Take 500 mg by mouth three (3) times daily.) 60 Tab 0     No Known Allergies  Social History     Socioeconomic History    Marital status:      Spouse name: Not on file    Number of children: Not on file    Years of education: Not on file    Highest education level: Not on file   Occupational History    Not on file   Tobacco Use    Smoking status: Never Smoker    Smokeless tobacco: Never Used   Substance and Sexual Activity    Alcohol use: No    Drug use: No    Sexual activity: Never   Other Topics Concern    Not on file   Social History Narrative    Not on file     Social Determinants of Health     Financial Resource Strain:     Difficulty of Paying Living Expenses: Not on file   Food Insecurity:     Worried About Running Out of Food in the Last Year: Not on file    Ric of Food in the Last Year: Not on file   Transportation Needs:     Lack of Transportation (Medical): Not on file    Lack of Transportation (Non-Medical):  Not on file   Physical Activity:     Days of Exercise per Week: Not on file    Minutes of Exercise per Session: Not on file   Stress:     Feeling of Stress : Not on file   Social Connections:     Frequency of Communication with Friends and Family: Not on file    Frequency of Social Gatherings with Friends and Family: Not on file    Attends Judaism Services: Not on file    Active Member of Clubs or Organizations: Not on file    Attends Club or Organization Meetings: Not on file    Marital Status: Not on file   Intimate Partner Violence:     Fear of Current or Ex-Partner: Not on file    Emotionally Abused: Not on file    Physically Abused: Not on file    Sexually Abused: Not on file   Housing Stability:     Unable to Pay for Housing in the Last Year: Not on file    Number of Places Lived in the Last Year: Not on file    Unstable Housing in the Last Year: Not on file      Family History   Problem Relation Age of Onset    Arrhythmia Sister     Heart Attack Other     Heart Surgery Other        Review of Systems    General: negative for fever/chills. Ambulating short distance with assistance of a walker without any leg pain or discomfort. Feels that she has better control of her lower extremity edema with compression stockings. Denies any recent ulcerations skin rashes or skin lesions on either lower extremity. Eyes: negative for vision loss   HENT: negative for cold symptoms   Respiratory negative for shortness of breath   Cardiac: negative for chest pain   Vascular negative for foot pain at night    Gastrointestinal: negative for abdominal pain   Genitourinary: negative for dysuria    Endocrine: negative for excessive thirst   Skin: negative for rash   Neurological: negative for paralysis   Psychiatric: negative for depression     . Physical Exam:    Visit Vitals  /70 (BP 1 Location: Left arm, BP Patient Position: Sitting)   Pulse 70   Ht 5' 6\" (1.676 m)   Wt 186 lb 1.1 oz (84.4 kg)   SpO2 97%   BMI 30.03 kg/m²   General: Alert and oriented x3. Answers all questions appropriately. Moves all extremities to command. Brought into the room via wheelchair. Constitutional:  Patient is well developed, well nourished, and not distressed. Ambulated into the room with a normal steady gait. HEENT: atraumatic, normocephalic, wearing a mask. Eyes:   Cunjunctivae clear, no scleral icterus  Neck:   No JVD present. No adenopathy. No carotid bruits or thrills noted bilaterally.    Cardiovascular:  Normal rate, regular rhythm, normal heart sounds. No murmur heard. Pulmonary/Chest: Effort normal . No wheezes, rales or rhonchi noted. Extremities: Normal range of motion. Strong palpable pedal pulses. Positive bilateral edema noted up to her sock lines, left lower extremity same size as the right on this visit. No calf tenderness to palpation bilaterally. Neurovascularly intact bilaterally both soft and deep sensation. Multiple varicosities noted left greater than right. Multiple spider veins noted left greater than right. Nontender to palpation  Neurological:  he  is alert and oriented x3 . Gait normal. Motor & sensory grossly intact in all 4 limbs. Psych: Appropriate mood and affect. Skin:  Skin is warm and dry. No ulcerations. Capillary refill <3 sec bilaterally. No ulcerations or rashes noted. Impression and Plan:  Chronic DVT -stable  Venous insufficiency  Patient instructed to continue present Rx -patient instructed is okay to discontinue Eliquis at this time. She is to resume 81 mg aspirin daily. Patient agreeable. Patient once again educated on the importance of compression elevation in the face of venous insufficiency. Patient educated on proper elevation. Patient instructed that she must lay flat with 2-3 pillows under her legs to get her legs 20 to 30 inches above the level of her heart for proper elevation. Patient and daughter state they will try. Patient applauded for being compliant with compression therapy and recommended continued. Follow-up as needed  In the meantime patient encouraged to continue with compression elevation and next visit we will discuss the length of time the patient will need to be on anticoagulation. Preliminary report shows 6 months for acute DVT. Patient agreeable  In the meantime patient encouraged to continue to increase her activity as tolerated, to continue to make better lifestyle choices, nutritional choices, and of course smoking cessation.   I put a call into  Joshua's office, her primary, informed them of medication change. Spoke with daughter via phone call on this visit. Patient and daughter state they understand above, are appreciative of our service, and are willing to proceed as planned. Meantime patient encouraged to continue to make better lifestyle choices, continue to increase her activity with ambulation, continue to use her walker for balance issues, and continue to be compliant with her medications. Patient encouraged to continue with compression therapy. We will discuss details with my attending. Follow-up and Dispositions    · Return if symptoms worsen or fail to improve. Roseanna Hanley PA-C    PLEASE NOTE:  This document has been produced using voice recognition software. Unrecognized errors in transcription may be present.

## 2022-06-03 NOTE — PROGRESS NOTES
1. Have you been to the ER, urgent care clinic since your last visit? No      Hospitalized since your last visit? No     2. Have you seen or consulted any other health care providers outside of the [address] [place] [address] since your last visit? Include any pap smears or colon screening.   No

## 2022-06-04 DIAGNOSIS — I87.2 VENOUS (PERIPHERAL) INSUFFICIENCY: ICD-10-CM

## 2023-10-04 NOTE — PROGRESS NOTES
Community Care Team Documentation for Patient in [facility]     Patient discharged from [facility] 3/31/2019 - 4/5/2019 to [facility], on 4/5/2019. Hospital Discharge diagnosis:  Rhabdomyolysis [M62.82] (resolved)   Urinary Tract Infection (stable/treated)   Osteoarthritis (stable)   Hypertension (stable)     SNF Attending Provider:  Harman Alston    Anticipated discharge date from SNF:        PCP : Familia Jerry MD    Nurse Navigator: LORI    [facility] Team rounds completed, updates provided by facility. Full Code  PT/OT:Icy-hot to jayme knees. Progressing with therapy  Social: patient lives in a single family home alone. Her daughter Michelet Zhu lives next door    Low Risk            3       Total Score        3 Has Seen PCP in Last 6 Months (Yes=3, No=0)        Criteria that do not apply:    . Living with Significant Other. Assisted Living. LTAC. SNF. or   Rehab    Patient Length of Stay (>5 days = 3)    IP Visits Last 12 Months (1-3=4, 4=9, >4=11)    Pt.  Coverage (Medicare=5 , Medicaid, or Self-Pay=4)    Charlson Comorbidity Score (Age + Comorbid Conditions)      Active Ambulatory Problems     Diagnosis Date Noted    Other premature beats 05/13/2013    Essential hypertension, benign 05/13/2013    Other and unspecified hyperlipidemia 05/13/2013    Rhabdomyolysis 04/01/2019     Resolved Ambulatory Problems     Diagnosis Date Noted    No Resolved Ambulatory Problems     Past Medical History:   Diagnosis Date    Essential hypertension     Hyperlipidemia     Other premature beats 5/13/2013 no

## 2023-12-29 ENCOUNTER — HOME HEALTH ADMISSION (OUTPATIENT)
Age: 88
End: 2023-12-29
Payer: MEDICARE

## 2023-12-31 ENCOUNTER — HOSPITAL ENCOUNTER (EMERGENCY)
Facility: HOSPITAL | Age: 88
Discharge: HOME | End: 2023-12-31
Attending: EMERGENCY MEDICINE
Payer: MEDICARE

## 2023-12-31 VITALS
SYSTOLIC BLOOD PRESSURE: 123 MMHG | HEIGHT: 68 IN | TEMPERATURE: 98.4 F | OXYGEN SATURATION: 94 % | HEART RATE: 87 BPM | BODY MASS INDEX: 28.29 KG/M2 | DIASTOLIC BLOOD PRESSURE: 64 MMHG | RESPIRATION RATE: 16 BRPM

## 2023-12-31 DIAGNOSIS — M19.90 ARTHRITIS: Primary | ICD-10-CM

## 2023-12-31 PROCEDURE — 99282 EMERGENCY DEPT VISIT SF MDM: CPT

## 2023-12-31 ASSESSMENT — LIFESTYLE VARIABLES
HOW OFTEN DO YOU HAVE A DRINK CONTAINING ALCOHOL: NEVER
HOW MANY STANDARD DRINKS CONTAINING ALCOHOL DO YOU HAVE ON A TYPICAL DAY: PATIENT DOES NOT DRINK

## 2023-12-31 NOTE — ED TRIAGE NOTES
Pt arrived in the ER complaining of swelling on her hand. Her doctor sent her because he thinks she has shingles and is out of office.

## 2023-12-31 NOTE — ED PROVIDER NOTES
data to display         None    Screenings                  CIWA Assessment  BP: 123/64  Pulse: 87       Medical Decision Making     DDX: Fracture, strain, sprain, dislocation, abscess, septic joint, other    DISCUSSION:  Severity of condition: Mild  Acuity of condition: Acute    89 y.o. female with what appears to be an arthritic inflammation of the first metacarpophalangeal joint of the right hand.  No signs or symptoms of infection at this time.  Continue to treat with cold packs ice and nonsteroidals at home as needed.  Discharged home in improved condition.          In evaluation of the above differential diagnosis, consideration was given to the following tests and treatments.        The decision to perform testing and results were discussed with the patient. I discussed each of these tests and considerations with the patient. They agree with the plan of discharge.      Diagnosis and Disposition     DISPOSITION:  DISPOSITION Decision To Discharge 12/31/2023 11:45:24 AM      DISCHARGE NOTE:  Tara Fernandes's  results have been reviewed with her.  She has been counseled regarding her diagnosis, treatment, and plan.  She verbally conveys understanding and agreement of the signs, symptoms, diagnosis, treatment and prognosis and additionally agrees to follow up as discussed.  She also agrees with the care-plan and conveys that all of her questions have been answered.  I have also provided discharge instructions for her that include: educational information regarding their diagnosis and treatment, and list of reasons why they would want to return to the ED prior to their follow-up appointment, should her condition change. She has been provided with education for proper emergency department utilization.     CLINICAL IMPRESSION:  1. Arthritis        PLAN:  D/C Home    DISCHARGE MEDICATIONS:  Current Discharge Medication List             Details   acetaminophen (TYLENOL) 325 MG tablet Take 650 mg by mouth every 4

## 2024-01-02 ENCOUNTER — HOME CARE VISIT (OUTPATIENT)
Age: 89
End: 2024-01-02

## 2024-01-02 VITALS
OXYGEN SATURATION: 95 % | SYSTOLIC BLOOD PRESSURE: 128 MMHG | RESPIRATION RATE: 17 BRPM | HEART RATE: 84 BPM | TEMPERATURE: 95.7 F | DIASTOLIC BLOOD PRESSURE: 60 MMHG

## 2024-01-02 PROCEDURE — 0221000100 HH NO PAY CLAIM PROCEDURE

## 2024-01-02 PROCEDURE — G0151 HHCP-SERV OF PT,EA 15 MIN: HCPCS

## 2024-01-02 ASSESSMENT — ENCOUNTER SYMPTOMS
DYSPNEA ACTIVITY LEVEL: AFTER AMBULATING 10 - 20 FT
PAIN LOCATION - PAIN QUALITY: DULL ACHING

## 2024-01-02 NOTE — HOME HEALTH
shower chair   ROM: B LE WFL   STRENGTH: R hip flexos +3/5 R quads and hamstrings +3/5 R DF/PF 5/5  L hip flexors -3/5 L quads and hamstrings -3/5 L DF/PF 5/5   WOUNDS:pt has very fraile skin. Pt has a old blood blister on R anterior shin   BED MOBILITY:supine<> sit MOD I   TRANSFERS:sit to stand from bed with B UE support for push off with SBA. sit to stand from commode with B UE support for push off with SBA. sit to stand from recliner chair with it 1/4 elevated with B UE support for push off with SBA. Increased time needed to complete all transfers   GAIT: Pt amb 25 ft x 2 with RW with reciprocal gait pattern with decreased B step length slow vasile. B feet do not consistanly clear the floor during swing phase of gait. Pt has a fwd flexed posture with a downward gaze. Pt was fatigued after amb O2 sat was 99% with HR of 91.   STAIRS:NA  BALANCE: Pt scored 13/28 on Tinetti Balance Assessment placing pt at high  risk for falls.   PATIENT RESPONE TO TX: Pt pain level was the same throughout tx session   PATIENT LEVEL OF UNDERSTANDING OF EDUCATION PROVIDED Educated pt to use RW at all times when amb for safety   PATIENT EDUCATION PROVIDED THIS VISIT: safety, HEP, walking, deep breathing, Educated pt to change positon every HR for pressure relief      HEP consisting of:  1. Walking every hour during the day with RW  2. B LE seated hip flexion, LAQS, AP 3 daily x10  Written HEP issued, patient/caregiver verbalized understanding.   CONTINUED NEED FOR THE FOLLOWING SKILLS: HH PT is medically necessary to address pain,, decreased strength,decreased independence with functional transfers, impaired gait, impaired stair negotiation, and impaired balance in order to improve functional independence, quality of life, return to PLOF, and reduce the risk for falls.  ASSESSMENT: Pt presents with decreased strength B LE. Decreased endurance. Pt is amb limited distances with RW. A required with all transfers. Pt needs A on stairs.

## 2024-01-04 ENCOUNTER — HOME CARE VISIT (OUTPATIENT)
Age: 89
End: 2024-01-04

## 2024-01-04 VITALS
TEMPERATURE: 97.8 F | DIASTOLIC BLOOD PRESSURE: 60 MMHG | OXYGEN SATURATION: 95 % | HEART RATE: 86 BPM | SYSTOLIC BLOOD PRESSURE: 130 MMHG | RESPIRATION RATE: 17 BRPM

## 2024-01-04 PROCEDURE — G0157 HHC PT ASSISTANT EA 15: HCPCS

## 2024-01-04 ASSESSMENT — ENCOUNTER SYMPTOMS: PAIN LOCATION - PAIN QUALITY: ACHE

## 2024-01-04 NOTE — HOME HEALTH
SUBJECTIVE: Pt/dtr denied medication changes, falls, or trips to ER since last visit. Stated, \"I feel ok I guess.\" Reported pain/swelling in R hand and trigger finger. \"They said it's arthritis.\"    CAREGIVER INVOLVEMENT/ASSISTANCE NEEDED FOR: A normal inability to leave home exists and a taxing and substantial effort is required. Unable to leave the home w/o FWW and (A) for safety d/t fall risk. Pt requires assist from family for ADLs, IADLs, medication management, and transportation to MD appts.     OBJECTIVE: See interventions.    PATIENT EDUCATION PROVIDED THIS VISIT: See interventions.    PATIENT RESPONSE TO EDUCATION PROVIDED: Pt/dtr verbalized understanding of transfer/gait training, HEP, and safety ed. 50% return demo. Will require further teaching for health maintenance, fall prevention and to be IND w/ HEP.    PATIENT RESPONSE TO TREATMENT: Tolerated tx well w/o significant Inc pain or SOB. Fatigued, but vitals remained stable w/ activity.    ASSESSMENT OF PROGRESS TOWARD GOALS: Unable to stand from standard seat height requiring elevated surface and use of BUEs d/t BLE weakness and will benefit from strength training. Sit<>stand w/ SBA for safety and VCs for correct hand placement. Unable to safely amb w/o FWW, SBA, and cueing for fall prevention d/t gait and balance impairment and will need further training to be able to safely egress home.    PLAN FOR NEXT VISIT: Gait/balance training.    THE FOLLOWING DISCHARGE PLANNING WAS DISCUSSED WITH THE PATIENT/CAREGIVER: Discharge to self/caregiver under supervision of MD once PT goals are met or maximum benefits achieved in the HH setting.

## 2024-01-09 ENCOUNTER — HOME CARE VISIT (OUTPATIENT)
Age: 89
End: 2024-01-09
Payer: MEDICARE

## 2024-01-09 VITALS
OXYGEN SATURATION: 93 % | RESPIRATION RATE: 18 BRPM | DIASTOLIC BLOOD PRESSURE: 70 MMHG | SYSTOLIC BLOOD PRESSURE: 130 MMHG | HEART RATE: 90 BPM | TEMPERATURE: 97.4 F

## 2024-01-09 PROCEDURE — G0157 HHC PT ASSISTANT EA 15: HCPCS

## 2024-01-09 ASSESSMENT — ENCOUNTER SYMPTOMS: PAIN LOCATION - PAIN QUALITY: SORE

## 2024-01-09 NOTE — HOME HEALTH
SUBJECTIVE: Pt/CG denied medication changes, falls, or trips to ER since last visit. Denied pain in R hand today, but c/o ongoing trigger finger to middle finger (R worse than L).    CAREGIVER INVOLVEMENT/ASSISTANCE NEEDED FOR: A normal inability to leave home exists and a taxing and substantial effort is required. Unable to leave the home w/o FWW and (A) for safety d/t fall risk. Pt requires assist from family for ADLs, IADLs, medication management, and transportation to MD appts.     OBJECTIVE: See interventions.    PATIENT EDUCATION PROVIDED THIS VISIT: See interventions.    PATIENT RESPONSE TO EDUCATION PROVIDED: Pt verbalized understanding of transfer/gait/balance training and fall prevention education. Needs continued teaching for safety and to improve gait mechanics to reduce fall risk.    PATIENT RESPONSE TO TREATMENT: Tolerated tx well as evidenced by no c/o Inc pain. No LOB or SOB. Expressed fatigue post continuous amb. SpO2 96%; HR 94 bpm.     ASSESSMENT OF PROGRESS TOWARD GOALS: Progressing towards goals. Transfers w/ SBA for safety d/t fall risk, but only able to stand from elevated surface and w/ use of BUEs d/t weakness. Improved carryover of safe tech during transfers, but will requiring continued training to transfer w/ less assist for more IND living. Improved Tinetti (14/28), but score indicates she remains high fall risk. Amb Inc distance this visit, but only safe to do so w/ use of FWW and SBA requiring cueing for safety and to implement strategies to improve gait mechanics to reduce fall risk. Pt unable to safely negotiate stairs to egress home and amb outdoors w/o taxing effort and assist for fall prevention; will need further training to be able to safely egress home for MD appts.       PLAN FOR NEXT VISIT: Gait/balance training.    THE FOLLOWING DISCHARGE PLANNING WAS DISCUSSED WITH THE PATIENT/CAREGIVER: Discharge to self/caregiver under supervision of MD once PT goals are met or maximum

## 2024-01-11 ENCOUNTER — HOME CARE VISIT (OUTPATIENT)
Age: 89
End: 2024-01-11
Payer: MEDICARE

## 2024-01-11 VITALS
DIASTOLIC BLOOD PRESSURE: 70 MMHG | SYSTOLIC BLOOD PRESSURE: 138 MMHG | HEART RATE: 88 BPM | RESPIRATION RATE: 16 BRPM | TEMPERATURE: 98.2 F | OXYGEN SATURATION: 96 %

## 2024-01-11 PROCEDURE — G0157 HHC PT ASSISTANT EA 15: HCPCS

## 2024-01-11 ASSESSMENT — ENCOUNTER SYMPTOMS: PAIN LOCATION - PAIN QUALITY: ACHE/SORE

## 2024-01-11 NOTE — HOME HEALTH
SUBJECTIVE: Pt/CG denied medication changes, falls, or trips to ER since last visit. \"I'm doing ok, but my hands bother me and I can't do like I want with them.\"    CAREGIVER INVOLVEMENT/ASSISTANCE NEEDED FOR: A normal inability to leave home exists and a taxing and substantial effort is required. Unable to leave the home w/o FWW and (A) for safety d/t fall risk. Pt requires assist from family for ADLs, IADLs, medication management, and transportation to MD appts.     OBJECTIVE: See interventions.    PATIENT EDUCATION PROVIDED THIS VISIT: See interventions.    PATIENT RESPONSE TO EDUCATION PROVIDED: Pt verbalized understanding of instructions/education, but further safety training needed for Inc carryover to reduce fall risk.    PATIENT RESPONSE TO TREATMENT: Tolerated tx well w/o LOB or SOB. No c/o of Inc pain. Fatigued and mild HUANG post exertion, but vitals remained stable and WNL.    ASSESSMENT OF PROGRESS TOWARD GOALS: Steady progress towards goals. Sit<>stand transfers w/ supervision requiring BUE support and from elevated surface. Was able to stand 4x consecutively this visit. Able to negotiate porch stairs to egress home, but only safe to do so w/ close SBA-CGA and someone to carry AD up/down steps for safety. Amb w/ flexed posture, shuffling gait, and downward gaze requiring skilled cues in order to improve gait mechanics to improve safety during amb.    PLAN FOR NEXT VISIT: Gait/balance training.    THE FOLLOWING DISCHARGE PLANNING WAS DISCUSSED WITH THE PATIENT/CAREGIVER: Discussed upcoming discharge to self/caregiver under supervision of MD feliz/ pt and dtr. Both verbalized understanding. Plan for D/C on 1/18/24 w/ PT Wickdiana who will call to confirm appt. NOMNC provided, pt signed, uploaded to chart, and pt provided copy.

## 2024-01-16 ENCOUNTER — HOME CARE VISIT (OUTPATIENT)
Age: 89
End: 2024-01-16
Payer: MEDICARE

## 2024-01-16 VITALS
HEART RATE: 83 BPM | TEMPERATURE: 97.8 F | OXYGEN SATURATION: 97 % | RESPIRATION RATE: 17 BRPM | DIASTOLIC BLOOD PRESSURE: 70 MMHG | SYSTOLIC BLOOD PRESSURE: 120 MMHG

## 2024-01-16 PROCEDURE — G0157 HHC PT ASSISTANT EA 15: HCPCS

## 2024-01-16 ASSESSMENT — ENCOUNTER SYMPTOMS: PAIN LOCATION - PAIN QUALITY: ACHE/SORE

## 2024-01-17 NOTE — HOME HEALTH
SUBJECTIVE: Pt/CG denied medication changes, falls, or trips to ER since last visit. No new complaints.    CAREGIVER INVOLVEMENT/ASSISTANCE NEEDED FOR: A normal inability to leave home exists and a taxing and substantial effort is required. Unable to leave the home w/o FWW and (A) for safety d/t fall risk. Pt requires assist from family for ADLs, IADLs, medication management, and transportation to MD appts.     OBJECTIVE: See interventions.    PATIENT EDUCATION PROVIDED THIS VISIT: See interventions.     PATIENT RESPONSE TO EDUCATION PROVIDED: Pt verbalized understanding of instructions/education, but further safety training needed for Inc carryover to reduce fall risk.    PATIENT RESPONSE TO TREATMENT: Tolerated tx well w/o LOB or SOB. No c/o of Inc pain. Fatigued and mild HUANG post exertion, but vitals remained stable and WNL.    **Assessment and Summary of Care:  Patient's current functional status before discharge is as follows  Strength: not tested  ROM: BLEs WFL  Bed Mobility: Mod I  Transfers: Mod I from elevated surfaces  Gait/WC mobility: amb x135' Mod I inside the home w/ FWW  Stairs: negotiated 4 porch steps using handrail w/ step-to pattern requiring close SBA-CGA  Special Tests:   Tinetti: 14/28 (13/28 at eval)  Recommendations Pt has reached max potential, returned to baseline, and no longer has skilled need therefore appropriate for D/C next visit.    PLAN FOR NEXT VISIT: D/C w/ PT Wickline    THE FOLLOWING DISCHARGE PLANNING WAS DISCUSSED WITH THE PATIENT/CAREGIVER: Discussed upcoming discharge to self/caregiver under supervision of MD next visit.

## 2024-01-18 ENCOUNTER — HOME CARE VISIT (OUTPATIENT)
Age: 89
End: 2024-01-18
Payer: MEDICARE

## 2024-01-18 VITALS
RESPIRATION RATE: 16 BRPM | DIASTOLIC BLOOD PRESSURE: 62 MMHG | OXYGEN SATURATION: 96 % | TEMPERATURE: 96 F | HEART RATE: 78 BPM | SYSTOLIC BLOOD PRESSURE: 128 MMHG

## 2024-01-18 PROCEDURE — G0151 HHCP-SERV OF PT,EA 15 MIN: HCPCS

## 2024-01-18 NOTE — HOME HEALTH
instructed. Requires assist of aother person to carry AD up/down stairs for safety.ON SOC Pt scored 13/28 on Tinetti Balance Assessment placing pt at high  risk for falls. Today at DC pt scored a 15/28 on Tinetti Balance Assessment placing pt as a high  fall risk.. Pt did make progress but did not met goal of 16/28/Pt has made progress and has met  goals.  Discharge plan: Discharge from Home Health PT services as Max benefit for functioal improvements met   , office has been notified of DC from HHPT as Max benefit for functional improvements met

## 2024-01-19 ENCOUNTER — HOME CARE VISIT (OUTPATIENT)
Age: 89
End: 2024-01-19
Payer: MEDICARE

## 2024-01-19 VITALS
DIASTOLIC BLOOD PRESSURE: 84 MMHG | TEMPERATURE: 97 F | HEART RATE: 91 BPM | SYSTOLIC BLOOD PRESSURE: 128 MMHG | OXYGEN SATURATION: 92 % | RESPIRATION RATE: 18 BRPM

## 2024-01-19 PROCEDURE — G0152 HHCP-SERV OF OT,EA 15 MIN: HCPCS

## 2024-01-19 NOTE — HOME HEALTH
Per hospitalization 12/31/23 Tara Fernandes is a 89 y.o. female who presents to the emergency department with pain in the right index finger metacarpophalangeal joint. Patient stated she was unable to use it to admit because it felt swollen. No fevers chills nausea vomiting shortness of breath chest pain or other associated symptoms.    List of Comorbidities: Abnormal EKG Right leg abrasion Arthritis Cataracts HTN HLD GoutFalls Left leg DVT Vertebral compression fracture     PLOF: Pt lives with alone in a mutli level home with 4 steps with enter with B handrails. Pt daughter lives next door and her and boyfriend check on pt multiple times a day. Pt has private duty aide that is not consistently scheduled but comes weekly to assist with bathing pt and will assist as necessary. Pt bedroom and primary bathroom are on the first floor     CAREGIVER ASSISTANCE: Pt family (daughter) and personal care aid assist with ADLs, IADLs and mobility as needed    Medications: Pt with no medication changes reported since last reviewed and educated to continue as directed per MD.    SUBJECTIVE: Pt expressing no pain but stiffness in her B hands 3rd digit.     DME ORDERED/RECOMMENEDED: N/A    OBJECTIVE:  BATHING: Pt has walk in shower with shower chair. Pt CGA/SBA for bathing  TOILETING: Pt modified independent for toileting  UB DRESSING: Pt SBA for upper body dressing to ramírez/doff shirts  LB DRESSING: Pt SBA for lower body dressing to ramírez/doff socks, shoes and pants  GROOMING: Pt set up/supervision for grooming for hair care, oral care and washing hands/face  FEEDING: Pt independent for self feeding    RUE MMT: 2+/5 LUE MMT: 3+/5  RUE ROM:~ 90 degrees shoulder flexion LUE ROM: WFL    VISION:Pt vision is WFL for reading directions/medications and to navigate their home environment safely.    BALANCE:Pt with good sitting balance/tolerance. Pt with fair standing balance/tolerance    BUE COORDINATION: Pt with decreased ability to

## 2024-01-22 ENCOUNTER — HOME CARE VISIT (OUTPATIENT)
Age: 89
End: 2024-01-22
Payer: MEDICARE

## 2024-01-22 ASSESSMENT — ENCOUNTER SYMPTOMS: DYSPNEA ACTIVITY LEVEL: AFTER AMBULATING MORE THAN 20 FT

## 2024-01-22 NOTE — HOME HEALTH
Arrived at pt house for scheduled vist and discharge with no answer at door despite prolonged time provided to allow pt to answer door. Tried knocking and door bell. Daughter called and notified. WIll attempt to reschedule

## 2024-01-23 ENCOUNTER — HOME CARE VISIT (OUTPATIENT)
Age: 89
End: 2024-01-23
Payer: MEDICARE

## 2024-01-23 ENCOUNTER — APPOINTMENT (OUTPATIENT)
Age: 89
End: 2024-01-23
Payer: MEDICARE

## 2024-01-23 NOTE — HOME HEALTH
Arrived at pt house for scheduled occupational therapy vist after speaking with daughter via phone yesterday. Rang doorbell and knocked on door loudly allowing for increased time for pt to safley answer the door. Not answer at the door. Called daughters number provided with no answer and left message.

## 2024-01-24 ENCOUNTER — HOME CARE VISIT (OUTPATIENT)
Age: 89
End: 2024-01-24
Payer: MEDICARE

## 2024-01-24 VITALS
TEMPERATURE: 97.4 F | SYSTOLIC BLOOD PRESSURE: 130 MMHG | OXYGEN SATURATION: 93 % | RESPIRATION RATE: 16 BRPM | HEART RATE: 79 BPM | DIASTOLIC BLOOD PRESSURE: 72 MMHG

## 2024-01-24 PROCEDURE — G0152 HHCP-SERV OF OT,EA 15 MIN: HCPCS

## 2024-01-24 ASSESSMENT — ENCOUNTER SYMPTOMS
PAIN LOCATION - PAIN QUALITY: ACH
DYSPNEA ACTIVITY LEVEL: AFTER AMBULATING MORE THAN 20 FT

## 2024-01-24 NOTE — HOME HEALTH
SUBJECTIVE: Pt expressing slight pain/stiffness in her B hands.     CAREGIVER INVOLVEMENT/ASSISTANCE NEEDED FOR: Pt daughter and personal care aid assists with ADL/IADLs as needed    HOME HEALTH SUPPLIES BY TYPE AND QUANTITY ORDERED/DELIVERED THIS VISIT INCLUDE: N/A    OBJECTIVE: See interventions    PATIENT RESPONSE TO TREATMENT: Pt responded well to skilled home health occupational therapy session    PATIENT LEVEL OF UNDERSTANDING OF EDUCATION PROVIDED: Pt educated on and reinforced on trigger finger HEP and trigger finger splint options. Pt provided with handouts to reference for pain managment techniques for trigger finger and her trigger finer fine motor HEP. Pt educated on when to use heat vs cold for pain refielf options for her hands.     OCCUPATIONAL THERAPY DISCHARGE: Mrs. Fernandes has been seen by skilled home health occupational therapy services to address deficits in BUE function. Pt has been educated on and is able to complete BUE fine motor HEP for trigger finger. She has been educated on trigger finger treatment options to include grasp modifications, trigger finger splint, tendon glides etc. Pt has met her goals and is ready/agreeable to discharge at this time. Pt medications have been reconciled. Pt has personal care aid and family support available daily as needed. Pt to discharge home with family support. If pt concerns with trigger finger continue she may benefit from outpatient occupational therapy.

## 2024-02-08 ENCOUNTER — HOME HEALTH ADMISSION (OUTPATIENT)
Age: 89
End: 2024-02-08
Payer: MEDICARE

## 2024-02-08 ENCOUNTER — HOME CARE VISIT (OUTPATIENT)
Age: 89
End: 2024-02-08

## 2024-02-13 ENCOUNTER — HOME CARE VISIT (OUTPATIENT)
Age: 89
End: 2024-02-13

## 2024-02-13 VITALS
SYSTOLIC BLOOD PRESSURE: 128 MMHG | HEART RATE: 70 BPM | TEMPERATURE: 97.7 F | DIASTOLIC BLOOD PRESSURE: 66 MMHG | RESPIRATION RATE: 18 BRPM | OXYGEN SATURATION: 92 %

## 2024-02-13 PROCEDURE — G0151 HHCP-SERV OF PT,EA 15 MIN: HCPCS

## 2024-02-13 PROCEDURE — 0221000100 HH NO PAY CLAIM PROCEDURE

## 2024-02-13 NOTE — HOME HEALTH
and the importance of consistency.  Pt and her pascual also verbalize concern about pt's trigger finger.  She received a cortisone shot last week but it is still painful and and stiff.  PT advised them to ask PCP about a referral to a hand therapist once home care is complete.     Pt presents with: decreased strength, impaired gait, decreased transfer status, decreased endurance, decreased balance and decreased safety, increased pain. HHPT is medically necessary in order to improve functional mobility/quality of life, decrease burden of care, reduce risk for re-hospitalization, work towards patient's personal goals of return to PLOF w decrease risk for falls.  Goals established for increased independence in the home, safe mobility in the home, improvement in strength and ROM - all designed to reduce fall risk and progress toward independence. Patient will benefit from PT intervention to progress toward meeting all established goals     PLAN: PT 2W3 1W3   for progression of mobility, ther ex for strength, ROM, provide education to patient and caregivers to maximize the recovery and reduce the fall risk to progress toward a return to independent function      DISCHARGE PLANNING DISCUSSED: Discharge to self and family under MD supervision once all goals have been met or patient has reached max potential. Patient/caregiver verbalized understanding.

## 2024-02-14 ENCOUNTER — HOME CARE VISIT (OUTPATIENT)
Age: 89
End: 2024-02-14

## 2024-02-15 ENCOUNTER — HOME CARE VISIT (OUTPATIENT)
Age: 89
End: 2024-02-15
Payer: MEDICARE

## 2024-02-19 ENCOUNTER — HOME CARE VISIT (OUTPATIENT)
Age: 89
End: 2024-02-19
Payer: MEDICARE

## 2024-02-20 ENCOUNTER — HOME CARE VISIT (OUTPATIENT)
Age: 89
End: 2024-02-20
Payer: MEDICARE

## 2024-02-20 PROCEDURE — G0157 HHC PT ASSISTANT EA 15: HCPCS

## 2024-02-23 ENCOUNTER — HOME CARE VISIT (OUTPATIENT)
Age: 89
End: 2024-02-23
Payer: MEDICARE

## 2024-02-23 PROCEDURE — G0151 HHCP-SERV OF PT,EA 15 MIN: HCPCS

## 2024-02-24 NOTE — HOME HEALTH
PT arrived at pt's home for scheduled visit.  PT spent 15 minutes ringing the bell, knocking, and waiting for pt to answer the door.  PT texted pt's pascual from the porch to let her know pt was not answering.  Later in the day pt's tylor called and let PT know pt states she never heard the ringing or knocking.  Pt to cont PT next week with CJ Barba PTA.

## 2024-02-27 ENCOUNTER — HOME CARE VISIT (OUTPATIENT)
Age: 89
End: 2024-02-27
Payer: MEDICARE

## 2024-02-27 VITALS
HEART RATE: 88 BPM | RESPIRATION RATE: 17 BRPM | DIASTOLIC BLOOD PRESSURE: 60 MMHG | TEMPERATURE: 97.6 F | SYSTOLIC BLOOD PRESSURE: 120 MMHG | OXYGEN SATURATION: 97 %

## 2024-02-27 PROCEDURE — G0157 HHC PT ASSISTANT EA 15: HCPCS

## 2024-02-27 NOTE — HOME HEALTH
LPTA waited 17 mins for pt to answer door post knocking/ringing doorbell/calling CG upon arrival.     SUBJECTIVE: Pt denied medication changes, falls, or trips to ER since last visit. CG (Will) arrived and provided his contact info for staff to call when in route to appt to ensure entry to the home. Pt stated she doesn't wear her hearing aids when she's home alone. \"I wasn't sure if I heard the doorbell or not.\"    CAREGIVER INVOLVEMENT/ASSISTANCE NEEDED FOR: A normal inability to leave home exists and a taxing and substantial effort is required. Unable to leave the home w/o FWW and (A) for safety d/t fall risk. Pt requires assist from family for some ADLs, IADLs, medication management, and transportation to MD appts.     OBJECTIVE: See interventions.    PATIENT EDUCATION PROVIDED THIS VISIT: See interventions. Recommended pt wear hearing aids to be able to hear LPTA at door or plan to sit in living room just prior to appt time to avoid having missed visits d/t staff being unable to see her. Explained that missed visits may result in early D/C from HH services.     PATIENT RESPONSE TO EDUCATION PROVIDED: Pt verbalized understanding of transfer training, HEP, and safety ed. Will require further teaching for health maintenance and fall prevention.    PATIENT RESPONSE TO TREATMENT: Tolerated well w/o LOB or SOB. Expressed onset of LBP w/ continuous amb, but did not limit activity. Fatigued but vitals remained stable post exertion.    ASSESSMENT OF PROGRESS TOWARD GOALS: Has good CG support and is motivated to participate in therapy. Transfers w/ supervision requiring use of BUE assist for push off from elevated surface. Amb Inc distance this visit, but only safe using FWW w/ supervision. Required verbal/visual/tactile cueing to improve posture/gait mechanics to reduce fall risk.     PLAN FOR NEXT VISIT: Gait/balance training.    THE FOLLOWING DISCHARGE PLANNING WAS DISCUSSED WITH THE PATIENT/CAREGIVER: Discharge to

## 2024-02-29 ENCOUNTER — HOME CARE VISIT (OUTPATIENT)
Age: 89
End: 2024-02-29
Payer: MEDICARE

## 2024-02-29 VITALS
DIASTOLIC BLOOD PRESSURE: 70 MMHG | RESPIRATION RATE: 16 BRPM | HEART RATE: 75 BPM | TEMPERATURE: 97.6 F | SYSTOLIC BLOOD PRESSURE: 130 MMHG | OXYGEN SATURATION: 96 %

## 2024-02-29 PROCEDURE — G0157 HHC PT ASSISTANT EA 15: HCPCS

## 2024-02-29 NOTE — HOME HEALTH
SUBJECTIVE: Pt denied medication changes, falls, or trips to ER since last visit. No new complaints.    CAREGIVER INVOLVEMENT/ASSISTANCE NEEDED FOR: A normal inability to leave home exists and a taxing and substantial effort is required. Unable to leave the home w/o FWW and (A) for safety d/t fall risk. Pt requires assist from family for some ADLs, IADLs, medication management, and transportation to MD appts.     OBJECTIVE: See interventions.    PATIENT EDUCATION PROVIDED THIS VISIT: See interventions.     PATIENT RESPONSE TO EDUCATION PROVIDED: Pt verbalized understanding of gait/balance/transfer training. Requires cont training to reduce fall risk. Partial teach-back of HEP.    PATIENT RESPONSE TO TREATMENT: Tolerated well w/o LOB or SOB. Fatigued and mild HUANG. Vitals remained stable.    ASSESSMENT OF PROGRESS TOWARD GOALS: Progressing towards goals. Improved Tinetti to 17/28, however still indicates pt is high fall risk. She continues to require FWW and supervision to safely amb t/o the home. Needs further gait/balance training to improve righting reactions and gait mechanics to be able to safely egress home for appts.    PLAN FOR NEXT VISIT: Gait/stair training.    THE FOLLOWING DISCHARGE PLANNING WAS DISCUSSED WITH THE PATIENT/CAREGIVER: Discharge to self/caregiver under supervision of MD once PT goals are met or maximum benefits achieved in the HH setting.

## 2024-03-05 ENCOUNTER — HOME CARE VISIT (OUTPATIENT)
Age: 89
End: 2024-03-05
Payer: MEDICARE

## 2024-03-05 VITALS
HEART RATE: 87 BPM | TEMPERATURE: 97.5 F | RESPIRATION RATE: 16 BRPM | SYSTOLIC BLOOD PRESSURE: 122 MMHG | DIASTOLIC BLOOD PRESSURE: 70 MMHG | OXYGEN SATURATION: 97 %

## 2024-03-05 PROCEDURE — G0157 HHC PT ASSISTANT EA 15: HCPCS

## 2024-03-05 NOTE — HOME HEALTH
SUBJECTIVE: Pt denied medication changes, falls, or trips to ER since last visit. \"I'm doing ok.\"    CAREGIVER INVOLVEMENT/ASSISTANCE NEEDED FOR: A normal inability to leave home exists and a taxing and substantial effort is required. Unable to leave the home w/o FWW and (A) for safety d/t fall risk. Pt requires assist from family for some ADLs, IADLs, medication management, and transportation to MD appts.     OBJECTIVE: See interventions.    PATIENT EDUCATION PROVIDED THIS VISIT: See interventions.     PATIENT RESPONSE TO EDUCATION PROVIDED: Pt verbalized understanding, but needs reinforcement w/ VCs to implement techniques to improve gait mechanics to reduce risk of falls.    PATIENT RESPONSE TO TREATMENT: Tolerated well w/o LOB or SOB. Fatigued and mild HUANG. Required seated rest breaks between activities d/t fatigue. Vitals remained stable.    ASSESSMENT OF PROGRESS TOWARD GOALS: Progressing towards goals. Unable to perform stair training d/t rain. Unable to transfer w/o BUE assist and from elevated seat height d/t weakness. Amb Inc distance, but only safe to do so using FWW w/ supervision and requires VCs to improve gait mechanics for fall prevention. Close SBA-CGA needed during balance training d/t high fall risk.    PLAN FOR NEXT VISIT: Balance and stair training.    THE FOLLOWING DISCHARGE PLANNING WAS DISCUSSED WITH THE PATIENT/CAREGIVER: Discharge to self/caregiver under supervision of MD once PT goals are met or maximum benefits achieved in the HH setting.

## 2024-03-12 ENCOUNTER — HOME CARE VISIT (OUTPATIENT)
Age: 89
End: 2024-03-12
Payer: MEDICARE

## 2024-03-12 VITALS
DIASTOLIC BLOOD PRESSURE: 68 MMHG | OXYGEN SATURATION: 94 % | RESPIRATION RATE: 17 BRPM | HEART RATE: 72 BPM | TEMPERATURE: 97.8 F | SYSTOLIC BLOOD PRESSURE: 140 MMHG

## 2024-03-12 PROCEDURE — G0157 HHC PT ASSISTANT EA 15: HCPCS

## 2024-03-12 NOTE — HOME HEALTH
SUBJECTIVE: Pt/dtr denied medication changes, falls, or trips to ER since last visit. Dtr (Clair) reported she will be out of town next week and CG Aurelio will be staying w/ pt.     CAREGIVER INVOLVEMENT/ASSISTANCE NEEDED FOR: A normal inability to leave home exists and a taxing and substantial effort is required. Unable to leave the home w/o FWW and (A) for safety d/t fall risk. Pt requires assist from family for some ADLs, IADLs, medication management, and transportation to MD appts.     OBJECTIVE: See interventions.    PATIENT EDUCATION PROVIDED THIS VISIT: See interventions.     PATIENT RESPONSE TO EDUCATION PROVIDED: Pt/dtr verbalized understanding. Dtr able to instruct pt in HEP, performs proper guarding to egress home, and has good safety awarenss. Pt forgetful and requires reminders for safety. Limited carryover of instructed gait mechanics d/t cognitive deficit and remains high fall risk.    PATIENT RESPONSE TO TREATMENT: Tolerated well w/o LOB or SOB. Fatigued w/ mild HUANG post stairs/amb but vitals remained stable. Seated rest breaks needed for pacing.    **Assessment and Summary of Care:  Patient's current functional status before discharge is as follows  Strength: not tested  ROM: BLEs WFL  Bed Mobility: IND  Transfers: Mod I  Gait/WC mobility: amb x200' indoors mod I using FWW; x90' outdoors w/ close supervision  Stairs: negotiates 4 porch stairs using handrails w/ SBA  Special Tests:   Modified 5xSTS 48.4 secs (unable at SOC)  Tinetti: 18/28 (16/28 at SOC)  Recommendations: Pt has reached max potential for HHPT services, no longer has skilled need and therefore appropriate for D/C next visit.    PLAN FOR NEXT VISIT: Final D/C w/ PT [facility].    THE FOLLOWING DISCHARGE PLANNING WAS DISCUSSED WITH THE PATIENT/CAREGIVER: Discussed plan for upcoming PT discharge and procedure. Pt/dtr both verbalized understanding. NOMNC provided, pt in agreement, signed, uploaded to chart, and pt provided copy.

## 2024-03-14 ENCOUNTER — HOME CARE VISIT (OUTPATIENT)
Age: 89
End: 2024-03-14

## 2024-03-19 ENCOUNTER — HOME CARE VISIT (OUTPATIENT)
Age: 89
End: 2024-03-19
Payer: MEDICARE

## 2024-03-19 VITALS
HEART RATE: 79 BPM | TEMPERATURE: 97.4 F | SYSTOLIC BLOOD PRESSURE: 120 MMHG | RESPIRATION RATE: 18 BRPM | DIASTOLIC BLOOD PRESSURE: 70 MMHG | OXYGEN SATURATION: 96 %

## 2024-03-19 PROCEDURE — G0151 HHCP-SERV OF PT,EA 15 MIN: HCPCS

## 2024-03-19 ASSESSMENT — ENCOUNTER SYMPTOMS: DYSPNEA ACTIVITY LEVEL: AFTER AMBULATING MORE THAN 20 FT

## 2024-03-19 NOTE — HOME HEALTH
shown by full participation w/o c/o pain and with stable vitals  PATIENT EDUCATION PROVIDED THIS VISIT: safety, HEP, walking, deep breathing           PATIENT LEVEL OF UNDERSTANDING OF EDUCATION PROVIDED: See pain documentation for details.  Pain this visit was within goal range for patient      Patient is s/p referral for PT and has been treated for strengthening, gait training, stair training, HEP training, safety training, and balance training.  Pt has made progress and met PT goals.  She is MOD I for transfers/amb in the home with RW (MIN A for shower) and needs S/SBA for stairs and outdoor amb with RW.  She is at moderate risk for falls.    ROM: B hip / lacks full ROM approx 10 degrees flexion contractures; dorsiflexion only to neutral.  ROM at [facility]: B hip / lacks full ROM approx 10 degrees flexion contractures; dorsiflexion only to neutral  STRENGTH: 30 sec STS = 4.  Strength at [facility]: B hip flexion 3/5, abd 25; knee flexion/extension 3+/5; dorsiflexion 3+/5.  Pt with some discomfort to the pressure for MMT which affected accuracy  WOUNDS: No wounds.  Wound status at [facility]: No wound  BED MOBILITY: I supine<>sit and rolling.  Bed mobility at [facility]: I supine<>sit and rolling L/R  TRANSFERS: MOD I sit<>stand from EOB and recliner.  MIN A for walk in shower transfer.  Transfers at [facility]: Sit<>stand from toilet and EOB MOD I with extra time and effort needed.  Pt has a recliner/lift chair which she used for sit<>stand.   GAIT: Pt amb in the house with RW MOD I.  Per LPTA amb outside with RW and S. Gait characterized by flexed trunk.  Gait at [facility]: Pt amb in the house x 40' with S using RW.  Gait characterized by: short step length, decreased foot clearance, flexed trunk with RW pushed too far in front of her  STAIRS: Pt declined stairs today.  Per LPTA she amb up/down 4 steps with rail and SBA.  Stairs at [facility] NT - pt's pascual reports she always assists pt on the steps and usually has a 2nd person to assist  BALANCE: Pt

## 2024-03-21 ENCOUNTER — APPOINTMENT (OUTPATIENT)
Facility: HOSPITAL | Age: 89
End: 2024-03-21
Payer: MEDICARE

## 2024-03-21 ENCOUNTER — HOSPITAL ENCOUNTER (EMERGENCY)
Facility: HOSPITAL | Age: 89
Discharge: HOME | End: 2024-03-21
Attending: EMERGENCY MEDICINE
Payer: MEDICARE

## 2024-03-21 VITALS
HEIGHT: 68 IN | HEART RATE: 86 BPM | DIASTOLIC BLOOD PRESSURE: 106 MMHG | BODY MASS INDEX: 27.28 KG/M2 | WEIGHT: 180 LBS | SYSTOLIC BLOOD PRESSURE: 139 MMHG | RESPIRATION RATE: 19 BRPM | TEMPERATURE: 97.5 F | OXYGEN SATURATION: 95 %

## 2024-03-21 DIAGNOSIS — R55 SYNCOPE AND COLLAPSE: Primary | ICD-10-CM

## 2024-03-21 LAB
ALBUMIN SERPL-MCNC: 3.2 G/DL (ref 3.4–5)
ALBUMIN/GLOB SERPL: 0.9 (ref 0.8–1.7)
ALP SERPL-CCNC: 124 U/L (ref 45–117)
ALT SERPL-CCNC: 19 U/L (ref 13–56)
ANION GAP SERPL CALC-SCNC: 6 MMOL/L (ref 3–18)
APPEARANCE UR: ABNORMAL
AST SERPL-CCNC: 16 U/L (ref 10–38)
BACTERIA URNS QL MICRO: ABNORMAL /HPF
BASOPHILS # BLD: 0 K/UL (ref 0–0.1)
BASOPHILS NFR BLD: 0 % (ref 0–2)
BILIRUB SERPL-MCNC: 0.7 MG/DL (ref 0.2–1)
BILIRUB UR QL: NEGATIVE
BUN SERPL-MCNC: 23 MG/DL (ref 7–18)
BUN/CREAT SERPL: 30 (ref 12–20)
CALCIUM SERPL-MCNC: 9 MG/DL (ref 8.5–10.1)
CHLORIDE SERPL-SCNC: 107 MMOL/L (ref 100–111)
CK SERPL-CCNC: 91 U/L (ref 26–192)
CO2 SERPL-SCNC: 29 MMOL/L (ref 21–32)
COLOR UR: YELLOW
CREAT SERPL-MCNC: 0.77 MG/DL (ref 0.6–1.3)
D DIMER PPP FEU-MCNC: 11.54 UG/ML(FEU)
DIFFERENTIAL METHOD BLD: ABNORMAL
EOSINOPHIL # BLD: 0.1 K/UL (ref 0–0.4)
EOSINOPHIL NFR BLD: 1 % (ref 0–5)
EPITH CASTS URNS QL MICRO: ABNORMAL /LPF (ref 0–5)
ERYTHROCYTE [DISTWIDTH] IN BLOOD BY AUTOMATED COUNT: 13.4 % (ref 11.6–14.5)
GLOBULIN SER CALC-MCNC: 3.6 G/DL (ref 2–4)
GLUCOSE SERPL-MCNC: 126 MG/DL (ref 74–99)
GLUCOSE UR STRIP.AUTO-MCNC: NEGATIVE MG/DL
HCT VFR BLD AUTO: 41.1 % (ref 35–45)
HGB BLD-MCNC: 13.2 G/DL (ref 12–16)
HGB UR QL STRIP: NEGATIVE
IMM GRANULOCYTES # BLD AUTO: 0 K/UL (ref 0–0.04)
IMM GRANULOCYTES NFR BLD AUTO: 0 % (ref 0–0.5)
KETONES UR QL STRIP.AUTO: NEGATIVE MG/DL
LEUKOCYTE ESTERASE UR QL STRIP.AUTO: ABNORMAL
LYMPHOCYTES # BLD: 1 K/UL (ref 0.9–3.6)
LYMPHOCYTES NFR BLD: 12 % (ref 21–52)
MCH RBC QN AUTO: 30.2 PG (ref 24–34)
MCHC RBC AUTO-ENTMCNC: 32.1 G/DL (ref 31–37)
MCV RBC AUTO: 94.1 FL (ref 78–100)
MONOCYTES # BLD: 0.8 K/UL (ref 0.05–1.2)
MONOCYTES NFR BLD: 9 % (ref 3–10)
NEUTS SEG # BLD: 6.7 K/UL (ref 1.8–8)
NEUTS SEG NFR BLD: 78 % (ref 40–73)
NITRITE UR QL STRIP.AUTO: NEGATIVE
NRBC # BLD: 0 K/UL (ref 0–0.01)
NRBC BLD-RTO: 0 PER 100 WBC
PH UR STRIP: 8 (ref 5–8)
PLATELET # BLD AUTO: 281 K/UL (ref 135–420)
PMV BLD AUTO: 9.9 FL (ref 9.2–11.8)
POTASSIUM SERPL-SCNC: 4.1 MMOL/L (ref 3.5–5.5)
PROT SERPL-MCNC: 6.8 G/DL (ref 6.4–8.2)
PROT UR STRIP-MCNC: NEGATIVE MG/DL
RBC # BLD AUTO: 4.37 M/UL (ref 4.2–5.3)
RBC #/AREA URNS HPF: NEGATIVE /HPF (ref 0–5)
SODIUM SERPL-SCNC: 142 MMOL/L (ref 136–145)
SP GR UR REFRACTOMETRY: >1.03 (ref 1–1.03)
TRI-PHOS CRY URNS QL MICRO: ABNORMAL
TROPONIN I SERPL HS-MCNC: 63 NG/L (ref 0–54)
TROPONIN I SERPL HS-MCNC: 65 NG/L (ref 0–54)
UROBILINOGEN UR QL STRIP.AUTO: 1 EU/DL (ref 0.2–1)
WBC # BLD AUTO: 8.6 K/UL (ref 4.6–13.2)
WBC URNS QL MICRO: ABNORMAL /HPF (ref 0–4)

## 2024-03-21 PROCEDURE — 81001 URINALYSIS AUTO W/SCOPE: CPT

## 2024-03-21 PROCEDURE — 96374 THER/PROPH/DIAG INJ IV PUSH: CPT

## 2024-03-21 PROCEDURE — 70450 CT HEAD/BRAIN W/O DYE: CPT

## 2024-03-21 PROCEDURE — 84484 ASSAY OF TROPONIN QUANT: CPT

## 2024-03-21 PROCEDURE — 93005 ELECTROCARDIOGRAM TRACING: CPT

## 2024-03-21 PROCEDURE — 80053 COMPREHEN METABOLIC PANEL: CPT

## 2024-03-21 PROCEDURE — 85025 COMPLETE CBC W/AUTO DIFF WBC: CPT

## 2024-03-21 PROCEDURE — 87086 URINE CULTURE/COLONY COUNT: CPT

## 2024-03-21 PROCEDURE — 71275 CT ANGIOGRAPHY CHEST: CPT

## 2024-03-21 PROCEDURE — 6360000002 HC RX W HCPCS

## 2024-03-21 PROCEDURE — 82550 ASSAY OF CK (CPK): CPT

## 2024-03-21 PROCEDURE — 73070 X-RAY EXAM OF ELBOW: CPT

## 2024-03-21 PROCEDURE — 2580000003 HC RX 258

## 2024-03-21 PROCEDURE — 6360000004 HC RX CONTRAST MEDICATION

## 2024-03-21 PROCEDURE — 99285 EMERGENCY DEPT VISIT HI MDM: CPT

## 2024-03-21 PROCEDURE — 85379 FIBRIN DEGRADATION QUANT: CPT

## 2024-03-21 RX ORDER — CEPHALEXIN 500 MG/1
500 CAPSULE ORAL 2 TIMES DAILY
Qty: 14 CAPSULE | Refills: 0 | Status: SHIPPED | OUTPATIENT
Start: 2024-03-21 | End: 2024-03-28

## 2024-03-21 RX ADMIN — IOPAMIDOL 80 ML: 755 INJECTION, SOLUTION INTRAVENOUS at 14:27

## 2024-03-21 RX ADMIN — CEFTRIAXONE 1000 MG: 1 INJECTION, POWDER, FOR SOLUTION INTRAMUSCULAR; INTRAVENOUS at 20:01

## 2024-03-21 ASSESSMENT — ENCOUNTER SYMPTOMS
ABDOMINAL PAIN: 0
DIARRHEA: 0
NAUSEA: 0
SHORTNESS OF BREATH: 0
EYE DISCHARGE: 0
VOMITING: 0
RHINORRHEA: 0
EYE REDNESS: 0
COUGH: 0

## 2024-03-21 NOTE — DISCHARGE INSTRUCTIONS
For wound care of the left elbow: change dressing twice daily. To change dressing, remove old dressing, wash with water, place a generous layer of vaseline, cover with dampened gauze, then wrap with a soft cloth bandage. Do not use an adhesive bandage. Bandage should be tight enough to hold the gauze in place, but not tight enough to cut off circulation.

## 2024-03-21 NOTE — ED NOTES
Left elbow skin tear/wound cleaned with wound cleanser. Triple antibiotic applied. Wound dressed with xeroform, abd pad, and kerlex.

## 2024-03-21 NOTE — ED PROVIDER NOTES
instructions, and instructions to PO hydrate. [RP]      ED Course User Index  [RP] Glenn Castro MD         Patient was given the following medications:  Medications   iopamidol (ISOVUE-370) 76 % injection 80 mL (80 mLs IntraVENous Given 3/21/24 1427)       CONSULTS: (Who and What was discussed)  None    Chronic Conditions: See HPI    Social Determinants affecting Dx or Tx: None    Records Reviewed (source and summary of external notes): Old Medical Records, Previous Radiology Studies, and Previous Laboratory Studies    Procedures    Is this patient to be included in the SEP-1 core measure? No Exclusion criteria - the patient is NOT to be included for SEP-1 Core Measure due to: 2+ SIRS criteria are not met      Diagnosis     Clinical Impression:   1. Syncope and collapse        Disposition: Discharge home    Hayder Urena MD  [address] [place] [id number] [phone]    Schedule an appointment as soon as possible for a visit in 1 week         Disclaimer: Sections of this note are dictated using utilizing voice recognition software.  Minor typographical errors may be present. If questions arise, please do not hesitate to contact me or call our department.        Glenn Castro MD, PGY-2  [facility]  3/21/2024 at 11:01 PM

## 2024-03-21 NOTE — ED TRIAGE NOTES
Client  had syncopical episode while walking to restroom at 0400.  Has noted skin tear to l. Elbow and soreness in coccyx area. No cardiac hx. Reports episodes of low bp while standing. NAD. AXOX4.

## 2024-03-22 LAB
BACTERIA SPEC CULT: NORMAL
EKG ATRIAL RATE: 74 BPM
EKG DIAGNOSIS: NORMAL
EKG P AXIS: 69 DEGREES
EKG P-R INTERVAL: 200 MS
EKG Q-T INTERVAL: 368 MS
EKG QRS DURATION: 66 MS
EKG QTC CALCULATION (BAZETT): 408 MS
EKG R AXIS: 70 DEGREES
EKG T AXIS: 38 DEGREES
EKG VENTRICULAR RATE: 74 BPM
SERVICE CMNT-IMP: NORMAL

## 2024-03-22 PROCEDURE — 93010 ELECTROCARDIOGRAM REPORT: CPT | Performed by: INTERNAL MEDICINE

## 2024-04-23 ENCOUNTER — HOME HEALTH ADMISSION (OUTPATIENT)
Age: 89
End: 2024-04-23
Payer: MEDICARE

## 2024-04-24 ENCOUNTER — HOME CARE VISIT (OUTPATIENT)
Age: 89
End: 2024-04-24

## 2024-04-24 PROCEDURE — 0221000100 HH NO PAY CLAIM PROCEDURE

## 2024-04-24 PROCEDURE — G0299 HHS/HOSPICE OF RN EA 15 MIN: HCPCS

## 2024-04-24 ASSESSMENT — ENCOUNTER SYMPTOMS
STOOL DESCRIPTION: FORMED
DYSPNEA ACTIVITY LEVEL: AFTER AMBULATING 10 - 20 FT
PAIN LOCATION - PAIN QUALITY: STIFF

## 2024-04-26 ENCOUNTER — HOME CARE VISIT (OUTPATIENT)
Age: 89
End: 2024-04-26

## 2024-04-26 VITALS
RESPIRATION RATE: 20 BRPM | TEMPERATURE: 97.7 F | OXYGEN SATURATION: 93 % | DIASTOLIC BLOOD PRESSURE: 68 MMHG | HEART RATE: 72 BPM | SYSTOLIC BLOOD PRESSURE: 128 MMHG

## 2024-04-26 VITALS
DIASTOLIC BLOOD PRESSURE: 70 MMHG | OXYGEN SATURATION: 95 % | SYSTOLIC BLOOD PRESSURE: 130 MMHG | TEMPERATURE: 98 F | RESPIRATION RATE: 14 BRPM | HEART RATE: 80 BPM

## 2024-04-26 PROCEDURE — G0151 HHCP-SERV OF PT,EA 15 MIN: HCPCS

## 2024-04-26 ASSESSMENT — ENCOUNTER SYMPTOMS: PAIN LOCATION - PAIN QUALITY: DULL

## 2024-04-27 NOTE — HOME HEALTH
Skilled services/Home bound verification: Recurring falls, abnormal gait, weakness, sob wtih exertion, fatigues easily.       Skilled Reason for admission/summary of clinical condition:  89 year old female with recent fall in the bathroom, reports it was related to dizziness, leaves walker outside bathroom due to space in bathroom limited.   Has history of falls.  Other history of DVT, HTN, arthritis,chronic pain.   Spoke with daughter on phone who stated many meds discontinued including htn meds.  Patient alert, oriented, forgetful at times, repeats same sentences more than once during admission visit.  Impaired decision making, has life alert, doesnt wear despite recurring falls, last one she had to crawl to bedroom from bathroom.   Has Generalized weakness, chronic pain knees, feet contribute to fall risk, interfere with safe mobility .   Daughter lives next door, patient lives alone, hires someone to do bathing once a week.   Referred to home health care for SN and PT, per phone call with daughter night before admission, request for HHA .  Daughter declined nee for MSW evaluation request.    This patient is homebound for the following reasons Requires considerable and taxing effort to leave the home  and Requires the assistance of 1 or more persons to leave the home .    Caregiver: daughter .  Caregiver assists with meals, meds, transportation.      Medications reconciled and all medications are available in the home this visit.      The following education was provided regarding medications: medication regimen, dose, actions, freuqency, purpose and potential side effects.   Medications  are effective at this time.      High risk medication teaching regarding anticoagulants, antiplatelets, antibiotics, antipsychotics, hypoglycemic agents, or opioid/narcotics performed (specify): Instructed patient/caregiver to notify SN/PT of any signs and symptoms of antiplatelet adverse effects including SOB, bleeding

## 2024-04-30 ENCOUNTER — HOME CARE VISIT (OUTPATIENT)
Age: 89
End: 2024-04-30

## 2024-04-30 ENCOUNTER — HOME CARE VISIT (OUTPATIENT)
Age: 89
End: 2024-04-30
Payer: MEDICARE

## 2024-04-30 VITALS
RESPIRATION RATE: 16 BRPM | HEART RATE: 80 BPM | OXYGEN SATURATION: 94 % | SYSTOLIC BLOOD PRESSURE: 130 MMHG | TEMPERATURE: 97.4 F | DIASTOLIC BLOOD PRESSURE: 72 MMHG

## 2024-04-30 PROCEDURE — G0157 HHC PT ASSISTANT EA 15: HCPCS

## 2024-04-30 NOTE — HOME HEALTH
Subjective: I can walk, sometimes I get dizzy.    Caregiver involvement: Pt's daughter lives next door and she stops by t/o the day and assists patient as needed.    Medications reviewed and all medications are available in the home this visit.    Medications are effective at this time.  no new medication    Patient education provided this visit: fall prevention, pressure relief,     Patient level of understanding of education provided: Pt requires further education.    Patient response to procedure performed:  Pt fatigued at end of session.    Patient's Progress towards personal goals: Pt reports no falls and no ER visits.  Pt slowly progressing with amb, she is able to make corrections with vc, but does not maintain corrections t/o gait.  Pt fatigues easily with standing activity and requires frequent rest breaks.  Transfers improved from SBA to supervision.  She demonstrated good safety awareness with hand placement in transition and stopped to lower elevated recliner seat before amb so she could sit upon return and not have to lower chair when potentially fatigued.    Continued need for the following skills: HHPT medically necessary to address decreased LE strength, decreased standing balance and increased risk of falls.    Plan for next visit:  standing balance and amb outside weather permitting    The following discharge planning was discussed with the pt/caregiver: d/c HHPT in 4 more visits.

## 2024-05-02 ENCOUNTER — HOME CARE VISIT (OUTPATIENT)
Age: 89
End: 2024-05-02

## 2024-05-02 VITALS
HEART RATE: 88 BPM | SYSTOLIC BLOOD PRESSURE: 128 MMHG | OXYGEN SATURATION: 94 % | RESPIRATION RATE: 16 BRPM | TEMPERATURE: 97.4 F | DIASTOLIC BLOOD PRESSURE: 74 MMHG

## 2024-05-02 PROCEDURE — G0157 HHC PT ASSISTANT EA 15: HCPCS

## 2024-05-02 NOTE — HOME HEALTH
Subjective:  I can do the stairs, I just take my time.    Caregiver involvement: Pt lives alone, but her daughter lives next door and checks on her t/o the day.    Medications reviewed and all medications are available in the home this visit.    Medications are effective at this time.  no new medication    Patient education provided this visit: fall prevention, body mechanics with amb on stairs, energy conservation, pressure relief    Patient level of understanding of education provided: Pt requires further education.    Patient response to procedure performed: Pt fatigued at end of session.     Patient's Progress towards personal goals: Pt reports no falls and no ER visits.  Pt was able to navigate front stairs to leave home today.  She required vc for safety and sequencing and was fatigued upon completion.  Pt requires increased time for amb t/o home, but had no LOB amb over carpeted and non carpeted surfaces.  Pt demonstrated good understanding of seated and reclined sitting exercises.    Plan for next visit: LE strengthening and standing balance.    The following discharge planning was discussed with the pt/caregiver: d/c HHPT in 3 more visits.

## 2024-05-04 ENCOUNTER — HOME CARE VISIT (OUTPATIENT)
Age: 89
End: 2024-05-04

## 2024-05-04 VITALS
TEMPERATURE: 97.7 F | OXYGEN SATURATION: 96 % | DIASTOLIC BLOOD PRESSURE: 70 MMHG | RESPIRATION RATE: 20 BRPM | SYSTOLIC BLOOD PRESSURE: 128 MMHG | HEART RATE: 82 BPM

## 2024-05-04 PROCEDURE — G0299 HHS/HOSPICE OF RN EA 15 MIN: HCPCS

## 2024-05-04 ASSESSMENT — ENCOUNTER SYMPTOMS
PAIN LOCATION - PAIN QUALITY: ACHE
STOOL DESCRIPTION: FORMED
DYSPNEA ACTIVITY LEVEL: AFTER AMBULATING LESS THAN 10 FT

## 2024-05-04 NOTE — HOME HEALTH
Skilled reason for visit: Full system assessment, patient education    Caregiver involvement: Daughter lives next door, in and out of home throughout the day, provides any needed assistance.  Also has a paid caregiver at intervals to assist with ADLs.    Medications reviewed and all medications are available in the home this visit.    The following education was provided regarding medications:  reviewed med regime.    MD notified of any discrepancies/look a-like medications/medication interactions: N/A  Medications are effective at this time.      Home health supplies by type and quantity ordered/delivered this visit include: None    Patient education provided this visit: Patient in bathroom upon SN arrival, observed to ambulate with rolling walker back to bedroom for visit. Bathroom is small, but patient used walker in room for safety.  Reviewed home safety and fall precautions with patient.  Discussed always using assistive device and taking time with ambulation,  Encouraged use of medic alert when alone in home, pt smiled and said \"ok\".  Patient does report pain today - states is in her L hip and she has noted it more after therapy and moving more.  Discussed medication for pain and non-pharmacologic methods of pain control to include positioning, relaxation, ice/heat, distraction.      Sharps education provided: N/A    Patient level of understanding of education provided: Provided verbal teachback but repeated reinforcement needed.    Patient response to procedure performed:  Tolerated full system assessment and patient teaching.    Agency Progress toward goals: Progressing    Patient's Progress towards personal goals: Patient progressing as well    Home exercise program: as per therapy    Continued need for the following skills: Nursing and Physical Therapy.    Plan for next visit: continue education home safety,fall prevention, pain management.  full system assessment    Patient and/or caregiver notified and

## 2024-05-06 ENCOUNTER — HOME CARE VISIT (OUTPATIENT)
Age: 89
End: 2024-05-06
Payer: MEDICARE

## 2024-05-06 VITALS
HEART RATE: 79 BPM | DIASTOLIC BLOOD PRESSURE: 64 MMHG | RESPIRATION RATE: 16 BRPM | SYSTOLIC BLOOD PRESSURE: 118 MMHG | TEMPERATURE: 97.7 F | OXYGEN SATURATION: 96 %

## 2024-05-06 PROCEDURE — G0157 HHC PT ASSISTANT EA 15: HCPCS

## 2024-05-06 ASSESSMENT — ENCOUNTER SYMPTOMS: PAIN LOCATION - PAIN QUALITY: ACHE

## 2024-05-06 NOTE — HOME HEALTH
Subjective: I can get around alright.    Caregiver involvement: Pt's daughter lives next door but checks in t/o the day with pt.    Medications reviewed and all medications are available in the home this visit.    Medications are effective at this time.  no new medication.    Patient education provided this visit: fall prevention, body mechanics with mobility    Patient level of understanding of education provided: Pt requires further education    Patient response to procedure performed: Pt fatigued at end of session.    Patient's Progress towards personal goals: Pt reports no falls and no ER visits.  Standing balance has improved as evidenced by Tinetti increasing from 15/28 to now 16/28.  Transfers have improved from SBA to now mod I.  She continues to require increased time for transitions.  She is gradually increasing distance she is able to amb t/o home.  Pt is able to navigate over carpeted and non carpeted surfaces w/o LOB.  She is on track for d/c next week.    Continued need for the following skills:HHPT medically necessary to address decreased LE strength, decreased standing balance and increased risk of falls.    Plan for next visit: standing balance and LE strength.    The following discharge planning was discussed with the pt/caregiver: d/c HHPT in 2 more visits.

## 2024-05-09 ENCOUNTER — HOME CARE VISIT (OUTPATIENT)
Age: 89
End: 2024-05-09
Payer: MEDICARE

## 2024-05-09 VITALS
SYSTOLIC BLOOD PRESSURE: 126 MMHG | TEMPERATURE: 97.5 F | OXYGEN SATURATION: 95 % | RESPIRATION RATE: 16 BRPM | HEART RATE: 64 BPM | DIASTOLIC BLOOD PRESSURE: 68 MMHG

## 2024-05-09 PROCEDURE — G0157 HHC PT ASSISTANT EA 15: HCPCS

## 2024-05-09 ASSESSMENT — ENCOUNTER SYMPTOMS: PAIN LOCATION - PAIN QUALITY: SORE

## 2024-05-09 NOTE — HOME HEALTH
Subjective:  I'm not feeling so well today.    Caregiver involvement: Pt lives alone, but her daughter lives next door and stops by t/o the day.  She assists with care as needed.    Medications reviewed and all medications are available in the home this visit.    Medications are effective at this time.  no new medication    Patient education provided this visit: fall prevention, energy conservation    Patient level of understanding of education provided: Pt able to verbalize pain management with rest breaks and occ tylenol.  Pt able to verbalize fall prevention to use walker and take her time with mobility.      Patient response to procedure performed:  Pt fatigued at end of session.    Patient's Progress towards personal goals: Pt reports no fall and no ER visits.  Pt has progressed with transfers from recliner from elevated position and SBA to now lowered position and sit <-> stand mod I. She uses B UE support and requires increased time for transition.  She occ requires multiple attempts from lowered position, but is able to stand with 1 attempt from standard height chairs. She has progressed with amb from amb 35 feet x 2 with rolling walker and SBA.  Now she is amb 110 feet with rolling walker and supervision.  She amb with decreased step through gait pattern, occ passing R foot with L foot.  She has forward flexed posture and steady vasile.  Standing balance has improved as evidenced by Tinetti score has improved from 15/28 to now Tinetti 18/28.    Continued need for the following skills: HHPT medically necessary to address decreased LE strength, decreased standing balance and increased risk of falls.    Plan for next visit: d/c HHPT next visit    The following discharge planning was discussed with the pt/caregiver: Plan to d/c HHPT next visit.

## 2024-05-10 NOTE — CASE COMMUNICATION
Assessment and Summary of Care:  Patient's current functional status before discharge is as follows  Strength: 3 reps sit <-> stand from recliner <->walker with B UE support 1 minute and 2 sec. she requires more than 1 attempt to stand from recliner.  3x sit <-> stand from kitchen chair with B UE  support in 40 sec.  Bed Mobility: mod I  Transfers: Pt has progressed with transfers from recliner from elevated position and SBA to now lowe red position and sit <-> stand mod I. She uses B UE support and requires increased time for transition.  She occ requires multiple attempts from lowered position, but is able to stand with 1 attempt from standard height chairs.   Gait:She has progressed with amb from amb 35 feet x 2 with rolling walker and SBA.  Now she is amb 110 feet with rolling walker and supervision.  She amb with decreased step through gait pattern, occ passing R  foot with L foot.  She has forward flexed posture and steady vasile.   Stairs: Pt amb down and up 4 stairs with B hands on handrail and min A.  She requires assistance with bringing walker down and up the stairs.  She uses step to gait pattern and pauses on each step before navigating to next step.  Special Tests: Standing balance has improved as evidenced by Tinetti score has improved from 15/28 to now Tinetti 18/28.    Recommendation s: d/c HHPT next visit.

## 2024-05-11 ENCOUNTER — HOME CARE VISIT (OUTPATIENT)
Age: 89
End: 2024-05-11
Payer: MEDICARE

## 2024-05-13 ENCOUNTER — HOME CARE VISIT (OUTPATIENT)
Age: 89
End: 2024-05-13
Payer: MEDICARE

## 2024-05-13 VITALS
RESPIRATION RATE: 14 BRPM | DIASTOLIC BLOOD PRESSURE: 74 MMHG | TEMPERATURE: 97.3 F | HEART RATE: 68 BPM | OXYGEN SATURATION: 99 % | SYSTOLIC BLOOD PRESSURE: 126 MMHG

## 2024-05-13 PROCEDURE — G0151 HHCP-SERV OF PT,EA 15 MIN: HCPCS

## 2024-05-13 ASSESSMENT — ENCOUNTER SYMPTOMS: DYSPNEA ACTIVITY LEVEL: AFTER AMBULATING MORE THAN 20 FT

## 2024-05-13 NOTE — HOME HEALTH
SUBJECTIVE: I daughter reported that the MD want pt to have a unrine test. I told her that we would need oders to that she said she would have them faxed over   REQUIRES CAREGIVER ASSISTANCE FOR: transporation, medications, ADLS, IADLS   MEDICATIONS REVIEWED AND RECONCILED no changes   NEXT MD APPT: Dr. Urena TBD  ROM: Decreased B knee extenison -10 degrees each.  B DF to neutral only . 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 Pt was able to completed 3 consecutive sit to stands from recliner chair. Increased time needed to complete task  WOUNDS:none   BED MOBILITY:supine<> sit MOD I  TRANSFERS:sit to stand from recliner chair x 5 MOD I with B UE support. increased time needed to complete taks. Pt was able to perfrom from recliner chair from lowest positon. Per PTA visit 5/9/24 sit <-> stand from recliner mod I. She uses B UE support and requires increased time for transition.  GAIT:   Pt amn 70 ft with RW on flat level surfaces with S recipical gait pattern B feet did clear the floor during swing phase of gait. Increased B step length noted. Slower vasile with fwd flexed posture. PTA visit 5/9/24 Pt amb 110 feet with rolling walker and supervision. She amb with decreased step through gait pattern, occ passing R foot with L foot. She has forward flexed posture and steady vasile.  STAIRS:NA as pt refusedPer PTA visit 5/2/24 Pt amb down and up 4 stairs with B hands on handrail and min A. She amb with step to gait pattern and pauses on each step before navigating to next step. Upon sitting O2 sats 95% on RA and  bpm. HR returned to 85 beats per minute in about 1 minute.  BALANCE: Pt scored 19/28 on Tinetti Balance Assessment placing pt at med  risk for falls.   PATIENT RESPONE TO TX: Pt pain level remained the same throughout tx session   PATIENT LEVEL OF UNDERSTANDING OF EDUCATION PROVIDED Pt educated to RW at all times when amb

## 2024-05-15 ENCOUNTER — HOSPITAL ENCOUNTER (OUTPATIENT)
Facility: HOSPITAL | Age: 89
Setting detail: SPECIMEN
Discharge: HOME | End: 2024-05-18
Payer: MEDICARE

## 2024-05-15 ENCOUNTER — HOME CARE VISIT (OUTPATIENT)
Age: 89
End: 2024-05-15
Payer: MEDICARE

## 2024-05-15 LAB
APPEARANCE UR: CLEAR
BACTERIA URNS QL MICRO: ABNORMAL /HPF
BILIRUB UR QL: NEGATIVE
COLOR UR: YELLOW
EPITH CASTS URNS QL MICRO: ABNORMAL /LPF (ref 0–5)
GLUCOSE UR STRIP.AUTO-MCNC: NEGATIVE MG/DL
HGB UR QL STRIP: NEGATIVE
KETONES UR QL STRIP.AUTO: NEGATIVE MG/DL
LEUKOCYTE ESTERASE UR QL STRIP.AUTO: ABNORMAL
NITRITE UR QL STRIP.AUTO: NEGATIVE
PH UR STRIP: 7.5 (ref 5–8)
PROT UR STRIP-MCNC: NEGATIVE MG/DL
RBC #/AREA URNS HPF: ABNORMAL /HPF (ref 0–5)
SP GR UR REFRACTOMETRY: 1.02 (ref 1–1.03)
UROBILINOGEN UR QL STRIP.AUTO: 1 EU/DL (ref 0.2–1)
WBC URNS QL MICRO: ABNORMAL /HPF (ref 0–4)

## 2024-05-15 PROCEDURE — 87086 URINE CULTURE/COLONY COUNT: CPT

## 2024-05-15 PROCEDURE — G0299 HHS/HOSPICE OF RN EA 15 MIN: HCPCS

## 2024-05-15 PROCEDURE — 81001 URINALYSIS AUTO W/SCOPE: CPT

## 2024-05-16 VITALS
RESPIRATION RATE: 18 BRPM | SYSTOLIC BLOOD PRESSURE: 142 MMHG | OXYGEN SATURATION: 97 % | HEART RATE: 76 BPM | DIASTOLIC BLOOD PRESSURE: 78 MMHG | TEMPERATURE: 98.1 F

## 2024-05-16 LAB
BACTERIA SPEC CULT: NORMAL
SERVICE CMNT-IMP: NORMAL

## 2024-05-16 ASSESSMENT — ENCOUNTER SYMPTOMS: DYSPNEA ACTIVITY LEVEL: AFTER AMBULATING MORE THAN 20 FT

## 2024-05-16 NOTE — HOME HEALTH
Skilled reason for visit: FINAL DISCHARGE VISIT, URINE SPECIMAN COLLECTED AS ORDERED, MED REVIEW, PHYSICAL ASSESSMENT, VITAL SIGNS    Caregiver involvement: Caregiver assists with medication management, transportation, meals, adls and iadls..    Medications reviewed and all medications are available in the home this visit.    The following education was provided regarding medications:  patient/cg reminded to continue to take medications as prescribed. patient aware to monitor for effectiveness and to notify staff of any adverse reactions to medications/any changes to medication regimen.  .    MD notified of any discrepancies/look a-like medications/medication interactions: N/A  Medications are effective at this time.      Home health supplies by type and quantity ordered/delivered this visit include: N/A    Patient education provided this visit: SEE CARE PLAN    Sharps education provided: NA    Patient level of understanding of education provided: PATIENT/CG WAS ABLE TO REPEAT BACK AND VOICED UNDERSTANDING OF ALL EDUCATION PROVIDED.    Patient response to procedure performed:  NA    Agency Progress toward goals: GOALS MET    Patient's Progress towards personal goals: GOALS MET    Home exercise program: HYDRATE, AMBULATE AS TOLERATED, TAKE MEDS AS DIRECTED, REST WHEN NEEDED    Continued need for the following skills: NA    Plan for next visit: NA    Patient and/or caregiver notified and agrees to changes in the Plan of Care: Yes.     The following discharge planning was discussed with the pt/caregiver: PATIENT WILL BE DISCHARGED WHEN MEDICALLY STABLE, WOUNDS HAVE HEALED AND ALL GOALS HAVE BEEN MET.

## 2024-07-05 ENCOUNTER — APPOINTMENT (OUTPATIENT)
Facility: HOSPITAL | Age: 89
End: 2024-07-05
Payer: MEDICARE

## 2024-07-05 ENCOUNTER — HOSPITAL ENCOUNTER (INPATIENT)
Facility: HOSPITAL | Age: 89
LOS: 5 days | Discharge: NURSING, REHAB OR LONG-TERM CARE | End: 2024-07-11
Attending: STUDENT IN AN ORGANIZED HEALTH CARE EDUCATION/TRAINING PROGRAM | Admitting: FAMILY MEDICINE
Payer: MEDICARE

## 2024-07-05 DIAGNOSIS — M62.82 NON-TRAUMATIC RHABDOMYOLYSIS: Primary | ICD-10-CM

## 2024-07-05 LAB
ALBUMIN SERPL-MCNC: 2.9 G/DL (ref 3.4–5)
ALBUMIN/GLOB SERPL: 0.6 (ref 0.8–1.7)
ALP SERPL-CCNC: 113 U/L (ref 45–117)
ALT SERPL-CCNC: 32 U/L (ref 13–56)
ANION GAP SERPL CALC-SCNC: 9 MMOL/L (ref 3–18)
AST SERPL-CCNC: 88 U/L (ref 10–38)
BASOPHILS # BLD: 0 K/UL (ref 0–0.1)
BASOPHILS NFR BLD: 0 % (ref 0–2)
BILIRUB SERPL-MCNC: 1.2 MG/DL (ref 0.2–1)
BUN SERPL-MCNC: 33 MG/DL (ref 7–18)
BUN/CREAT SERPL: 26 (ref 12–20)
CALCIUM SERPL-MCNC: 9.3 MG/DL (ref 8.5–10.1)
CHLORIDE SERPL-SCNC: 108 MMOL/L (ref 100–111)
CK SERPL-CCNC: 2544 U/L (ref 26–192)
CK SERPL-CCNC: 2570 U/L (ref 26–192)
CO2 SERPL-SCNC: 21 MMOL/L (ref 21–32)
CREAT SERPL-MCNC: 1.28 MG/DL (ref 0.6–1.3)
DIFFERENTIAL METHOD BLD: ABNORMAL
EOSINOPHIL # BLD: 0 K/UL (ref 0–0.4)
EOSINOPHIL NFR BLD: 0 % (ref 0–5)
ERYTHROCYTE [DISTWIDTH] IN BLOOD BY AUTOMATED COUNT: 14 % (ref 11.6–14.5)
GLOBULIN SER CALC-MCNC: 4.8 G/DL (ref 2–4)
GLUCOSE SERPL-MCNC: 158 MG/DL (ref 74–99)
HCT VFR BLD AUTO: 38.1 % (ref 35–45)
HGB BLD-MCNC: 13 G/DL (ref 12–16)
IMM GRANULOCYTES # BLD AUTO: 0.1 K/UL (ref 0–0.04)
IMM GRANULOCYTES NFR BLD AUTO: 0 % (ref 0–0.5)
LYMPHOCYTES # BLD: 0.4 K/UL (ref 0.9–3.6)
LYMPHOCYTES NFR BLD: 3 % (ref 21–52)
MAGNESIUM SERPL-MCNC: 2.2 MG/DL (ref 1.6–2.6)
MCH RBC QN AUTO: 29.7 PG (ref 24–34)
MCHC RBC AUTO-ENTMCNC: 34.1 G/DL (ref 31–37)
MCV RBC AUTO: 87.2 FL (ref 78–100)
MONOCYTES # BLD: 0.9 K/UL (ref 0.05–1.2)
MONOCYTES NFR BLD: 7 % (ref 3–10)
NEUTS SEG # BLD: 11.8 K/UL (ref 1.8–8)
NEUTS SEG NFR BLD: 89 % (ref 40–73)
NRBC # BLD: 0 K/UL (ref 0–0.01)
NRBC BLD-RTO: 0 PER 100 WBC
PHOSPHATE SERPL-MCNC: 3.5 MG/DL (ref 2.5–4.9)
PLATELET # BLD AUTO: 338 K/UL (ref 135–420)
PMV BLD AUTO: 9.6 FL (ref 9.2–11.8)
POTASSIUM SERPL-SCNC: 3.9 MMOL/L (ref 3.5–5.5)
PROT SERPL-MCNC: 7.7 G/DL (ref 6.4–8.2)
RBC # BLD AUTO: 4.37 M/UL (ref 4.2–5.3)
SODIUM SERPL-SCNC: 138 MMOL/L (ref 136–145)
WBC # BLD AUTO: 13.2 K/UL (ref 4.6–13.2)

## 2024-07-05 PROCEDURE — 83735 ASSAY OF MAGNESIUM: CPT

## 2024-07-05 PROCEDURE — 93005 ELECTROCARDIOGRAM TRACING: CPT | Performed by: STUDENT IN AN ORGANIZED HEALTH CARE EDUCATION/TRAINING PROGRAM

## 2024-07-05 PROCEDURE — 80053 COMPREHEN METABOLIC PANEL: CPT

## 2024-07-05 PROCEDURE — 71045 X-RAY EXAM CHEST 1 VIEW: CPT

## 2024-07-05 PROCEDURE — 2580000003 HC RX 258: Performed by: STUDENT IN AN ORGANIZED HEALTH CARE EDUCATION/TRAINING PROGRAM

## 2024-07-05 PROCEDURE — 85025 COMPLETE CBC W/AUTO DIFF WBC: CPT

## 2024-07-05 PROCEDURE — 99285 EMERGENCY DEPT VISIT HI MDM: CPT

## 2024-07-05 PROCEDURE — 82550 ASSAY OF CK (CPK): CPT

## 2024-07-05 PROCEDURE — 84100 ASSAY OF PHOSPHORUS: CPT

## 2024-07-05 RX ORDER — SODIUM CHLORIDE, SODIUM LACTATE, POTASSIUM CHLORIDE, AND CALCIUM CHLORIDE .6; .31; .03; .02 G/100ML; G/100ML; G/100ML; G/100ML
1000 INJECTION, SOLUTION INTRAVENOUS ONCE
Status: COMPLETED | OUTPATIENT
Start: 2024-07-05 | End: 2024-07-05

## 2024-07-05 RX ORDER — SODIUM CHLORIDE, SODIUM LACTATE, POTASSIUM CHLORIDE, AND CALCIUM CHLORIDE .6; .31; .03; .02 G/100ML; G/100ML; G/100ML; G/100ML
1000 INJECTION, SOLUTION INTRAVENOUS ONCE
Status: COMPLETED | OUTPATIENT
Start: 2024-07-05 | End: 2024-07-06

## 2024-07-05 RX ADMIN — SODIUM CHLORIDE, POTASSIUM CHLORIDE, SODIUM LACTATE AND CALCIUM CHLORIDE 1000 ML: 600; 310; 30; 20 INJECTION, SOLUTION INTRAVENOUS at 21:30

## 2024-07-05 ASSESSMENT — LIFESTYLE VARIABLES
HOW MANY STANDARD DRINKS CONTAINING ALCOHOL DO YOU HAVE ON A TYPICAL DAY: PATIENT DOES NOT DRINK
HOW OFTEN DO YOU HAVE A DRINK CONTAINING ALCOHOL: NEVER

## 2024-07-05 ASSESSMENT — PAIN - FUNCTIONAL ASSESSMENT: PAIN_FUNCTIONAL_ASSESSMENT: NONE - DENIES PAIN

## 2024-07-06 ENCOUNTER — APPOINTMENT (OUTPATIENT)
Facility: HOSPITAL | Age: 89
End: 2024-07-06
Payer: MEDICARE

## 2024-07-06 PROBLEM — R41.82 AMS (ALTERED MENTAL STATUS): Status: ACTIVE | Noted: 2024-07-06

## 2024-07-06 LAB
APPEARANCE UR: ABNORMAL
APPEARANCE UR: ABNORMAL
BACTERIA URNS QL MICRO: ABNORMAL /HPF
BACTERIA URNS QL MICRO: ABNORMAL /HPF
BILIRUB UR QL: ABNORMAL
BILIRUB UR QL: NEGATIVE
CK SERPL-CCNC: 2758 U/L (ref 26–192)
CK SERPL-CCNC: 3366 U/L (ref 26–192)
CK SERPL-CCNC: 3495 U/L (ref 26–192)
COLOR UR: ABNORMAL
COLOR UR: ABNORMAL
EKG ATRIAL RATE: 90 BPM
EKG DIAGNOSIS: NORMAL
EKG P AXIS: 71 DEGREES
EKG P-R INTERVAL: 158 MS
EKG Q-T INTERVAL: 360 MS
EKG QRS DURATION: 86 MS
EKG QTC CALCULATION (BAZETT): 440 MS
EKG R AXIS: 69 DEGREES
EKG T AXIS: 54 DEGREES
EKG VENTRICULAR RATE: 90 BPM
EPITH CASTS URNS QL MICRO: ABNORMAL /LPF (ref 0–5)
EPITH CASTS URNS QL MICRO: ABNORMAL /LPF (ref 0–5)
GLUCOSE UR STRIP.AUTO-MCNC: NEGATIVE MG/DL
GLUCOSE UR STRIP.AUTO-MCNC: NEGATIVE MG/DL
HGB UR QL STRIP: ABNORMAL
HGB UR QL STRIP: ABNORMAL
KETONES UR QL STRIP.AUTO: 15 MG/DL
KETONES UR QL STRIP.AUTO: ABNORMAL MG/DL
LEUKOCYTE ESTERASE UR QL STRIP.AUTO: ABNORMAL
LEUKOCYTE ESTERASE UR QL STRIP.AUTO: ABNORMAL
MAGNESIUM SERPL-MCNC: 2 MG/DL (ref 1.6–2.6)
NITRITE UR QL STRIP.AUTO: NEGATIVE
NITRITE UR QL STRIP.AUTO: NEGATIVE
PH UR STRIP: 6 (ref 5–8)
PH UR STRIP: 6 (ref 5–8)
PHOSPHATE SERPL-MCNC: 2.7 MG/DL (ref 2.5–4.9)
PROT UR STRIP-MCNC: 100 MG/DL
PROT UR STRIP-MCNC: 30 MG/DL
RBC #/AREA URNS HPF: ABNORMAL /HPF (ref 0–5)
RBC #/AREA URNS HPF: ABNORMAL /HPF (ref 0–5)
SP GR UR REFRACTOMETRY: 1.02 (ref 1–1.03)
SP GR UR REFRACTOMETRY: 1.02 (ref 1–1.03)
UROBILINOGEN UR QL STRIP.AUTO: 1 EU/DL (ref 0.2–1)
UROBILINOGEN UR QL STRIP.AUTO: 1 EU/DL (ref 0.2–1)
WBC URNS QL MICRO: ABNORMAL /HPF (ref 0–4)
WBC URNS QL MICRO: ABNORMAL /HPF (ref 0–4)

## 2024-07-06 PROCEDURE — 36415 COLL VENOUS BLD VENIPUNCTURE: CPT

## 2024-07-06 PROCEDURE — 2580000003 HC RX 258

## 2024-07-06 PROCEDURE — 81001 URINALYSIS AUTO W/SCOPE: CPT

## 2024-07-06 PROCEDURE — 87086 URINE CULTURE/COLONY COUNT: CPT

## 2024-07-06 PROCEDURE — 6360000002 HC RX W HCPCS

## 2024-07-06 PROCEDURE — 82550 ASSAY OF CK (CPK): CPT

## 2024-07-06 PROCEDURE — 2580000003 HC RX 258: Performed by: STUDENT IN AN ORGANIZED HEALTH CARE EDUCATION/TRAINING PROGRAM

## 2024-07-06 PROCEDURE — 70450 CT HEAD/BRAIN W/O DYE: CPT

## 2024-07-06 PROCEDURE — 83735 ASSAY OF MAGNESIUM: CPT

## 2024-07-06 PROCEDURE — 84100 ASSAY OF PHOSPHORUS: CPT

## 2024-07-06 PROCEDURE — 1100000000 HC RM PRIVATE

## 2024-07-06 PROCEDURE — 93010 ELECTROCARDIOGRAM REPORT: CPT | Performed by: INTERNAL MEDICINE

## 2024-07-06 RX ORDER — POTASSIUM CHLORIDE 7.45 MG/ML
10 INJECTION INTRAVENOUS PRN
Status: DISCONTINUED | OUTPATIENT
Start: 2024-07-06 | End: 2024-07-11 | Stop reason: HOSPADM

## 2024-07-06 RX ORDER — SODIUM CHLORIDE 9 MG/ML
INJECTION, SOLUTION INTRAVENOUS PRN
Status: DISCONTINUED | OUTPATIENT
Start: 2024-07-06 | End: 2024-07-11 | Stop reason: HOSPADM

## 2024-07-06 RX ORDER — MAGNESIUM SULFATE IN WATER 40 MG/ML
2000 INJECTION, SOLUTION INTRAVENOUS PRN
Status: DISCONTINUED | OUTPATIENT
Start: 2024-07-06 | End: 2024-07-11 | Stop reason: HOSPADM

## 2024-07-06 RX ORDER — POLYETHYLENE GLYCOL 3350 17 G/17G
17 POWDER, FOR SOLUTION ORAL DAILY PRN
Status: DISCONTINUED | OUTPATIENT
Start: 2024-07-06 | End: 2024-07-11 | Stop reason: HOSPADM

## 2024-07-06 RX ORDER — ONDANSETRON 4 MG/1
4 TABLET, ORALLY DISINTEGRATING ORAL EVERY 8 HOURS PRN
Status: DISCONTINUED | OUTPATIENT
Start: 2024-07-06 | End: 2024-07-11 | Stop reason: HOSPADM

## 2024-07-06 RX ORDER — SODIUM CHLORIDE 0.9 % (FLUSH) 0.9 %
5-40 SYRINGE (ML) INJECTION EVERY 12 HOURS SCHEDULED
Status: DISCONTINUED | OUTPATIENT
Start: 2024-07-06 | End: 2024-07-11 | Stop reason: HOSPADM

## 2024-07-06 RX ORDER — SODIUM CHLORIDE 9 MG/ML
INJECTION, SOLUTION INTRAVENOUS CONTINUOUS
Status: DISCONTINUED | OUTPATIENT
Start: 2024-07-06 | End: 2024-07-06

## 2024-07-06 RX ORDER — ACETAMINOPHEN 650 MG/1
650 SUPPOSITORY RECTAL EVERY 6 HOURS PRN
Status: DISCONTINUED | OUTPATIENT
Start: 2024-07-06 | End: 2024-07-07

## 2024-07-06 RX ORDER — SODIUM CHLORIDE, SODIUM LACTATE, POTASSIUM CHLORIDE, CALCIUM CHLORIDE 600; 310; 30; 20 MG/100ML; MG/100ML; MG/100ML; MG/100ML
INJECTION, SOLUTION INTRAVENOUS CONTINUOUS
Status: DISCONTINUED | OUTPATIENT
Start: 2024-07-06 | End: 2024-07-09

## 2024-07-06 RX ORDER — ACETAMINOPHEN 325 MG/1
650 TABLET ORAL EVERY 6 HOURS PRN
Status: DISCONTINUED | OUTPATIENT
Start: 2024-07-06 | End: 2024-07-07

## 2024-07-06 RX ORDER — POTASSIUM CHLORIDE 20 MEQ/1
40 TABLET, EXTENDED RELEASE ORAL PRN
Status: DISCONTINUED | OUTPATIENT
Start: 2024-07-06 | End: 2024-07-11 | Stop reason: HOSPADM

## 2024-07-06 RX ORDER — SODIUM CHLORIDE 0.9 % (FLUSH) 0.9 %
5-40 SYRINGE (ML) INJECTION PRN
Status: DISCONTINUED | OUTPATIENT
Start: 2024-07-06 | End: 2024-07-11 | Stop reason: HOSPADM

## 2024-07-06 RX ORDER — ONDANSETRON 2 MG/ML
4 INJECTION INTRAMUSCULAR; INTRAVENOUS EVERY 6 HOURS PRN
Status: DISCONTINUED | OUTPATIENT
Start: 2024-07-06 | End: 2024-07-11 | Stop reason: HOSPADM

## 2024-07-06 RX ORDER — HEPARIN SODIUM 5000 [USP'U]/ML
5000 INJECTION, SOLUTION INTRAVENOUS; SUBCUTANEOUS EVERY 8 HOURS SCHEDULED
Status: DISCONTINUED | OUTPATIENT
Start: 2024-07-06 | End: 2024-07-11 | Stop reason: HOSPADM

## 2024-07-06 RX ADMIN — SODIUM CHLORIDE: 9 INJECTION, SOLUTION INTRAVENOUS at 07:35

## 2024-07-06 RX ADMIN — HEPARIN SODIUM 5000 UNITS: 5000 INJECTION INTRAVENOUS; SUBCUTANEOUS at 15:50

## 2024-07-06 RX ADMIN — SODIUM CHLORIDE, PRESERVATIVE FREE 10 ML: 5 INJECTION INTRAVENOUS at 22:44

## 2024-07-06 RX ADMIN — SODIUM CHLORIDE, POTASSIUM CHLORIDE, SODIUM LACTATE AND CALCIUM CHLORIDE 1000 ML: 600; 310; 30; 20 INJECTION, SOLUTION INTRAVENOUS at 00:21

## 2024-07-06 RX ADMIN — SODIUM CHLORIDE, POTASSIUM CHLORIDE, SODIUM LACTATE AND CALCIUM CHLORIDE: 600; 310; 30; 20 INJECTION, SOLUTION INTRAVENOUS at 12:46

## 2024-07-06 NOTE — PROGRESS NOTES
4 Eyes Skin Assessment     NAME:  Tara Fernandes  YOB: 1931  MEDICAL RECORD NUMBER:  [id number]    The patient is being assessed for  Shift Handoff    I agree that at least one RN has performed a thorough Head to Toe Skin Assessment on the patient. ALL assessment sites listed below have been assessed.      Areas assessed by both nurses:    Head, Face, Ears, Shoulders, Back, Chest, Arms, Elbows, Hands, Sacrum. Buttock, Coccyx, Ischium, Legs. Feet and Heels, and Under Medical Devices         Does the Patient have a Wound? Yes wound(s) were present on assessment. LDA wound assessment was Initiated and completed by RN       Sesar Prevention initiated by RN: Yes  Wound Care Orders initiated by RN: No    Pressure Injury (Stage 3,4, Unstageable, DTI, NWPT, and Complex wounds) if present, place Wound referral order by RN under : No    New Ostomies, if present place, Ostomy referral order under : No     Nurse 1 eSignature: Electronically signed by Latonya Narayanan RN on 7/6/24 at 6:41 PM EDT    **SHARE this note so that the co-signing nurse can place an eSignature**    Nurse 2 eSignature: Electronically signed by Fátima Betancourt RN on 7/7/24 at 7:37 AM EDT

## 2024-07-06 NOTE — ED NOTES
Pt stated she needed to use the bathroom.  Pt taken to room and placed on bedpan.  Pt voided about 50cc dark yellow urine.   No diarrhea at this time

## 2024-07-06 NOTE — H&P
[facility]  Admission History and Physical      Patient:    Tara Fernandes      89 y.o. female            MRN:       [id number]                                                                                    Admission Date:         7/5/2024  Code status:                DNR    ASSESSMENT AND PLAN  Tara Fernandes is a 89 y.o. year old female with PMH of a fall, arthritis, HTN, HLD and DVT admitted for Rhabdomyolysis [M62.82]  AMS (altered mental status) [R41.82].     Rhabdomyolysis  Elevated creatinine kinase  GISSEL  AMS  -Pt was brought to the ED after being found in her bathroom covered in feces. -Daughter reports patient being confused and having general weakness.  -Laboratory results remarkable for 33 BUN and 2544 -> 3495 total CK. Suspect rhabdomyolysis and GISSEL.  -Low suspicion of a fall due to negative findings in head CT.  -Low suspicion of pneumonia and cardiac abnormalities due to negative findings in CXR, CBC and Troponin.  -UTI is probable due to small amount of leuk esterase found, but unlikely as Pt is asymptomatic.   Plan:  -admit to med surg  -initiate 100 NS phr  -trend CK and Cr  -hold atorvastatin  -daily BMP  -PT/OT      Global:  Code: DNR  IVF/Drips: none  I/O / Wt: per protocol  Diet: regular adult diet  Bowel Regimen, Last BM: afternoon of 7/5/24  DVT/AC: sqh  Mobility: per protocol   Disposition: tbd  Anticipated LOS: 3 days     Point of Contact (relationship, number): Clair, her daughter  [phone]      SUBJECTIVE:  History of Present Illness:  Tara Fernandes is a 89 y.o.  female with PMHx of a fall, arthritis, HTN, HLD and DVT who presented to the ED on 7/6/2024 with complaints of general weakness and confusion. Patient's daughter reports that the patient was found in her toilet covered in feces. Daughter is unaware of what the patient may have eaten to cause a diarrheal episode. The last time she saw the patient was the morning of 7/3/24. At that time, the patient was  Glucose, Ur Negative NEG mg/dL    Ketones, Urine TRACE (A) NEG mg/dL    Bilirubin, Urine Negative NEG      Blood, Urine MODERATE (A) NEG      Urobilinogen, Urine 1.0 0.2 - 1.0 EU/dL    Nitrite, Urine Negative NEG      Leukocyte Esterase, Urine SMALL (A) NEG     Urinalysis, Micro    Collection Time: 07/06/24  2:57 AM   Result Value Ref Range    WBC, UA 0 to 3 0 - 4 /hpf    RBC, UA 0 to 3 0 - 5 /hpf    Epithelial Cells, UA 2+ 0 - 5 /lpf    BACTERIA, URINE 1+ (A) NEG /hpf          RECENT IMAGING ON ADMISSION   CT HEAD WO CONTRAST    Result Date: 7/6/2024  No clearly acute intracranial findings. Mild burden of periventricular low-attenuation, likely chronic microvascular ischemic change. Electronically signed by Josué Hillman    XR CHEST PORTABLE    Result Date: 7/5/2024  Probable small left pleural effusion. Electronically signed by Sunshine Maldonado      MOST RECENT EKG ON ADMISSION  Results for orders placed or performed during the hospital encounter of 07/05/24   EKG 12 Lead   Result Value Ref Range    Ventricular Rate 90 BPM    Atrial Rate 90 BPM    P-R Interval 158 ms    QRS Duration 86 ms    Q-T Interval 360 ms    QTc Calculation (Bazett) 440 ms    P Axis 71 degrees    R Axis 69 degrees    T Axis 54 degrees    Diagnosis       Sinus rhythm with premature supraventricular complexes  Otherwise normal ECG  When compared with ECG of 21-MAR-2024 12:32,  premature supraventricular complexes are now present         I have discussed Ms. Taar Fernandes case with my attending who agrees with the plan of care.    Hu Crocker MD , PGY-1   [facility]   July 6, 2024, 4:43 AM     [facility]  Senior Addendum to History and Physical       Assessment & Plan:  89 y.o. female with PMH HTN and HLD, now admitted with suspected rhabdomyolysis.    Rhabdomyolysis  Elevated creatinine kinase  GISSEL  -Patient presented with generalized weakness, states she went to the toilet this morning and was unable to get

## 2024-07-06 NOTE — PROGRESS NOTES
conducted an initial consultation and Spiritual Assessment for Tara Fernandes, who is a 89 y.o.,female. Patient's Primary Language is: English.   According to the patient's EMR Islam Affiliation is: Presbyterian.     The reason the Patient came to the hospital is:   Patient Active Problem List    Diagnosis Date Noted    AMS (altered mental status) 07/06/2024    Rhabdomyolysis 04/01/2019    Essential hypertension, benign 05/13/2013        The  provided the following Interventions:  Initiated a relationship of care and support.   Explored issues of luigi, belief, spirituality and Adventism/ritual needs while hospitalized.  Listened empathically.  Provided chaplaincy education concerning Advance Medical Directive.  Provided information about Spiritual Care Services.  Offered prayer and assurance of continued prayers on patient's behalf.   Chart reviewed.    The following outcomes where achieved:  Patient shared limited information about both their medical narrative and spiritual journey/beliefs.   confirmed Patient's Islam Affiliation.  Patient processed feeling about current hospitalization.  Patient expressed gratitude for 's visit.    Assessment:  Patient does not have any Adventism/cultural needs that will affect patient's preferences in health care.  There are no spiritual or Adventism issues which require intervention at this time.     Plan:  Chaplains will continue to follow and will provide pastoral care on an as needed/requested basis.   recommends bedside caregivers page  on duty if patient shows signs of acute spiritual or emotional distress.     Boni Newton   Staff   Spiritual Health  [phone]

## 2024-07-06 NOTE — PROGRESS NOTES
Urobilinogen, Urine 1.0 0.2 - 1.0 EU/dL    Nitrite, Urine Negative NEG      Leukocyte Esterase, Urine LARGE (A) NEG     Urinalysis, Micro    Collection Time: 07/06/24  1:05 AM   Result Value Ref Range    WBC, UA 11 to 20 0 - 4 /hpf    RBC, UA 4 to 10 0 - 5 /hpf    Epithelial Cells, UA 4+ 0 - 5 /lpf    BACTERIA, URINE 2+ (A) NEG /hpf   CK    Collection Time: 07/06/24  2:30 AM   Result Value Ref Range    Total CK 3,495 (H) 26 - 192 U/L   Urinalysis    Collection Time: 07/06/24  2:57 AM   Result Value Ref Range    Color, UA DARK YELLOW      Appearance CLOUDY      Specific Gravity, UA 1.020 1.005 - 1.030      pH, Urine 6.0 5.0 - 8.0      Protein, UA 30 (A) NEG mg/dL    Glucose, Ur Negative NEG mg/dL    Ketones, Urine TRACE (A) NEG mg/dL    Bilirubin, Urine Negative NEG      Blood, Urine MODERATE (A) NEG      Urobilinogen, Urine 1.0 0.2 - 1.0 EU/dL    Nitrite, Urine Negative NEG      Leukocyte Esterase, Urine SMALL (A) NEG     Urinalysis, Micro    Collection Time: 07/06/24  2:57 AM   Result Value Ref Range    WBC, UA 0 to 3 0 - 4 /hpf    RBC, UA 0 to 3 0 - 5 /hpf    Epithelial Cells, UA 2+ 0 - 5 /lpf    BACTERIA, URINE 1+ (A) NEG /hpf   Magnesium    Collection Time: 07/06/24  7:24 AM   Result Value Ref Range    Magnesium 2.0 1.6 - 2.6 mg/dL   Phosphorus    Collection Time: 07/06/24  7:24 AM   Result Value Ref Range    Phosphorus 2.7 2.5 - 4.9 MG/DL       IMAGING AND PROCEDURES (LAST 24 HOURS)  CT HEAD WO CONTRAST    Result Date: 7/6/2024  No clearly acute intracranial findings. Mild burden of periventricular low-attenuation, likely chronic microvascular ischemic change. Electronically signed by Josué Hillman    XR CHEST PORTABLE    Result Date: 7/5/2024  Probable small left pleural effusion. Electronically signed by Sunshine Maldonado     .  *ATTENTION:  This note has been created by a medical student for educational purposes only.  Please do not refer to the content of this note for clinical decision-making,  billing, or other purposes.  Please see attending physician’s note to obtain clinical information on this patient.*         ================================================================  Further management for Ms. Tara ALEM BhagatFernandes will be discussed on rounds with my attending.      Tracy Frey, MS3  [facility]  July 6, 2024 7:34 AM

## 2024-07-06 NOTE — ED NOTES
Patient moved from hahn bed to bed 9 sara care done pure wick placed patient with no new complaints close observation continued.

## 2024-07-06 NOTE — PROGRESS NOTES
[facility]  Senior Addendum to History and Physical       Assessment & Plan:  89 y.o. female with PMH HTN and HLD, now admitted with suspected rhabdomyolysis.    Rhabdomyolysis  Elevated creatinine kinase  GISSEL  -Patient presented with generalized weakness, states she went to the toilet this morning and was unable to get off due to weakness, sitting on toilet for several hours  -In ED VSS on room air  -CBC with WBC of 13.2 with left shift, BMP notable for creatinine of 1.28 (baseline appears 0.7), CK 2500 --> 3500, UA notable for blood with negative RBCs, leukocyte esterase although with 4+ epithelial cells  - chest x-ray notable for probable small left pleural effusion, CT head without contrast with no acute findings  -EKG sinus rhythm without ischemic changes  -Differential includes musculoskeletal trauma which is most likely diagnosis in the setting of above history, although also on differential is myocardial injury although this is less likely in the absence of chest pain and reassuring EKG.  Also on differential is drug-induced myositis although less likely given patient has not been taking some time. Pt does appear slightly confused, suspect this is related to rhabdomyolysis, low suspicion for infectious source.    Plan  - admit to medicine  - start IVF at 150cc/hr  - avoid nephrotoxic agents  - Continue to follow creatinine kinase  - trend Cr  - Will hold home atorvastatin  - Daily CBC, CMP, Mag, Phos    Abnormal UA  -Pt asymptomatic from a UTI perspective, denies frequency, urgency, or dysuria  -Repeat UA with leukocyte esterase, neg nitrites  -Pt does appear slightly confused, Aox2 on my exam although low suspicion this is 2/2 to infectious source  Plan  -low suspicion for UTI, would hold off on abx at this time. Suspect above mentioned WBC is reactive       Subjective:  Tara Fernandes is a 89 y.o. female with PMH HLD, HTN, now presenting with complaint of generalized weakness.  Patient notes

## 2024-07-06 NOTE — ED PROVIDER NOTES
EMERGENCY DEPARTMENT HISTORY AND PHYSICAL EXAM    12:13 PM      Date: 7/5/2024  Patient Name: Tara Fernandes    History of Presenting Illness     Chief Complaint   Patient presents with    Fatigue       History From: Patient    Tara Fernandes is a 89 y.o. female   This        89-year-old female with no pertinent past medical history who presents with generalized weakness.  Patient went to the toilet and due to weakness cannot get off.  Patient been on the toilet since 8 AM.  Denies any other trauma, sick contacts, recent travel, or any other changes.  No alleviating factors.  When assessing ROS she has no nausea, vomiting, chest pain, shortness of breath, abdominal pain, change in bowel movement, or any other changes.       Nursing Notes were all reviewed and agreed with or any disagreements were addressed in the HPI.    PCP: Hayder Urena MD    Current Facility-Administered Medications   Medication Dose Route Frequency Provider Last Rate Last Admin    sodium chloride flush 0.9 % injection 5-40 mL  5-40 mL IntraVENous 2 times per day Berhane Vargas DO        sodium chloride flush 0.9 % injection 5-40 mL  5-40 mL IntraVENous PRN Berhane Vargas, DO        0.9 % sodium chloride infusion   IntraVENous PRN Berhane Vargas DO        acetaminophen (TYLENOL) tablet 650 mg  650 mg Oral 4 times per day Raina Trevino MD   650 mg at 07/09/24 0621    Or    acetaminophen (TYLENOL) suppository 650 mg  650 mg Rectal 4 times per day Raina Trevino MD        sodium chloride flush 0.9 % injection 5-40 mL  5-40 mL IntraVENous 2 times per day Hu Crocker MD   10 mL at 07/08/24 2158    sodium chloride flush 0.9 % injection 5-40 mL  5-40 mL IntraVENous PRN Hu Crocker MD        0.9 % sodium chloride infusion   IntraVENous PRN Hu Crocker MD        potassium chloride (KLOR-CON M) extended release tablet 40 mEq  40 mEq Oral PRN Hu Crocker MD        Or    potassium

## 2024-07-06 NOTE — PROGRESS NOTES
[facility]  POST-ROUNDING NOTE    Assessment & Plan:    -CK downtrending. Will continue to hydrate with IVF and trend CK.  -Patient has complained of LLE pain. Will obtain LLE PVL to rule out DVT, though low suspicion  -Attempted to obtain orthostatic vitals but patient was too weak to stand fully, even with assistance. Vitals were 147/71, HR 84, O2 96% while laying flat and 132/81, HR 90, O2 95% while seated      The above patient and plan were discussed with my supervising physician.     See daily progress note for full assessment/plan.      Jose Smiley MD, PGY-3  [facility]  7/6/2024, 2:28 PM

## 2024-07-07 ENCOUNTER — APPOINTMENT (OUTPATIENT)
Facility: HOSPITAL | Age: 89
End: 2024-07-07
Payer: MEDICARE

## 2024-07-07 LAB
ANION GAP SERPL CALC-SCNC: 10 MMOL/L (ref 3–18)
BACTERIA SPEC CULT: NORMAL
BASOPHILS # BLD: 0 K/UL (ref 0–0.1)
BASOPHILS NFR BLD: 0 % (ref 0–2)
BUN SERPL-MCNC: 22 MG/DL (ref 7–18)
BUN/CREAT SERPL: 29 (ref 12–20)
CALCIUM SERPL-MCNC: 8.7 MG/DL (ref 8.5–10.1)
CHLORIDE SERPL-SCNC: 107 MMOL/L (ref 100–111)
CK SERPL-CCNC: 1635 U/L (ref 26–192)
CO2 SERPL-SCNC: 22 MMOL/L (ref 21–32)
CREAT SERPL-MCNC: 0.76 MG/DL (ref 0.6–1.3)
DIFFERENTIAL METHOD BLD: ABNORMAL
EKG ATRIAL RATE: 91 BPM
EKG DIAGNOSIS: NORMAL
EKG P AXIS: 35 DEGREES
EKG P-R INTERVAL: 158 MS
EKG Q-T INTERVAL: 334 MS
EKG QRS DURATION: 72 MS
EKG QTC CALCULATION (BAZETT): 410 MS
EKG R AXIS: 44 DEGREES
EKG T AXIS: 38 DEGREES
EKG VENTRICULAR RATE: 91 BPM
EOSINOPHIL # BLD: 0 K/UL (ref 0–0.4)
EOSINOPHIL NFR BLD: 0 % (ref 0–5)
ERYTHROCYTE [DISTWIDTH] IN BLOOD BY AUTOMATED COUNT: 14.1 % (ref 11.6–14.5)
GLUCOSE SERPL-MCNC: 137 MG/DL (ref 74–99)
HCT VFR BLD AUTO: 40.6 % (ref 35–45)
HGB BLD-MCNC: 13.1 G/DL (ref 12–16)
IMM GRANULOCYTES # BLD AUTO: 0.1 K/UL (ref 0–0.04)
IMM GRANULOCYTES NFR BLD AUTO: 1 % (ref 0–0.5)
LYMPHOCYTES # BLD: 0.8 K/UL (ref 0.9–3.6)
LYMPHOCYTES NFR BLD: 7 % (ref 21–52)
MAGNESIUM SERPL-MCNC: 2 MG/DL (ref 1.6–2.6)
MCH RBC QN AUTO: 29.2 PG (ref 24–34)
MCHC RBC AUTO-ENTMCNC: 32.3 G/DL (ref 31–37)
MCV RBC AUTO: 90.6 FL (ref 78–100)
MONOCYTES # BLD: 1.3 K/UL (ref 0.05–1.2)
MONOCYTES NFR BLD: 10 % (ref 3–10)
NEUTS SEG # BLD: 9.9 K/UL (ref 1.8–8)
NEUTS SEG NFR BLD: 82 % (ref 40–73)
NRBC # BLD: 0 K/UL (ref 0–0.01)
NRBC BLD-RTO: 0 PER 100 WBC
PHOSPHATE SERPL-MCNC: 1.9 MG/DL (ref 2.5–4.9)
PLATELET # BLD AUTO: 331 K/UL (ref 135–420)
PMV BLD AUTO: 10.2 FL (ref 9.2–11.8)
POTASSIUM SERPL-SCNC: 3.6 MMOL/L (ref 3.5–5.5)
RBC # BLD AUTO: 4.48 M/UL (ref 4.2–5.3)
SERVICE CMNT-IMP: NORMAL
SODIUM SERPL-SCNC: 139 MMOL/L (ref 136–145)
WBC # BLD AUTO: 12.1 K/UL (ref 4.6–13.2)

## 2024-07-07 PROCEDURE — 2580000003 HC RX 258

## 2024-07-07 PROCEDURE — 85025 COMPLETE CBC W/AUTO DIFF WBC: CPT

## 2024-07-07 PROCEDURE — 1100000000 HC RM PRIVATE

## 2024-07-07 PROCEDURE — 6370000000 HC RX 637 (ALT 250 FOR IP)

## 2024-07-07 PROCEDURE — 93971 EXTREMITY STUDY: CPT

## 2024-07-07 PROCEDURE — 6360000002 HC RX W HCPCS

## 2024-07-07 PROCEDURE — 84100 ASSAY OF PHOSPHORUS: CPT

## 2024-07-07 PROCEDURE — 94761 N-INVAS EAR/PLS OXIMETRY MLT: CPT

## 2024-07-07 PROCEDURE — 82550 ASSAY OF CK (CPK): CPT

## 2024-07-07 PROCEDURE — 80048 BASIC METABOLIC PNL TOTAL CA: CPT

## 2024-07-07 PROCEDURE — 73130 X-RAY EXAM OF HAND: CPT

## 2024-07-07 PROCEDURE — 36415 COLL VENOUS BLD VENIPUNCTURE: CPT

## 2024-07-07 PROCEDURE — 93010 ELECTROCARDIOGRAM REPORT: CPT | Performed by: INTERNAL MEDICINE

## 2024-07-07 PROCEDURE — 97530 THERAPEUTIC ACTIVITIES: CPT

## 2024-07-07 PROCEDURE — 97161 PT EVAL LOW COMPLEX 20 MIN: CPT

## 2024-07-07 PROCEDURE — 83735 ASSAY OF MAGNESIUM: CPT

## 2024-07-07 PROCEDURE — 93005 ELECTROCARDIOGRAM TRACING: CPT

## 2024-07-07 RX ORDER — OLANZAPINE 5 MG/1
5 TABLET, ORALLY DISINTEGRATING ORAL ONCE
Status: COMPLETED | OUTPATIENT
Start: 2024-07-07 | End: 2024-07-07

## 2024-07-07 RX ORDER — ACETAMINOPHEN 325 MG/1
650 TABLET ORAL EVERY 6 HOURS SCHEDULED
Status: DISCONTINUED | OUTPATIENT
Start: 2024-07-07 | End: 2024-07-11 | Stop reason: HOSPADM

## 2024-07-07 RX ORDER — ACETAMINOPHEN 650 MG/1
650 SUPPOSITORY RECTAL EVERY 6 HOURS SCHEDULED
Status: DISCONTINUED | OUTPATIENT
Start: 2024-07-07 | End: 2024-07-11 | Stop reason: HOSPADM

## 2024-07-07 RX ADMIN — ACETAMINOPHEN 325MG 650 MG: 325 TABLET ORAL at 23:18

## 2024-07-07 RX ADMIN — SODIUM CHLORIDE, PRESERVATIVE FREE 10 ML: 5 INJECTION INTRAVENOUS at 23:19

## 2024-07-07 RX ADMIN — HEPARIN SODIUM 5000 UNITS: 5000 INJECTION INTRAVENOUS; SUBCUTANEOUS at 15:31

## 2024-07-07 RX ADMIN — DIBASIC SODIUM PHOSPHATE, MONOBASIC POTASSIUM PHOSPHATE AND MONOBASIC SODIUM PHOSPHATE 2 TABLET: 852; 155; 130 TABLET ORAL at 19:54

## 2024-07-07 RX ADMIN — DIBASIC SODIUM PHOSPHATE, MONOBASIC POTASSIUM PHOSPHATE AND MONOBASIC SODIUM PHOSPHATE 2 TABLET: 852; 155; 130 TABLET ORAL at 12:17

## 2024-07-07 RX ADMIN — OLANZAPINE 5 MG: 5 TABLET, ORALLY DISINTEGRATING ORAL at 02:58

## 2024-07-07 RX ADMIN — ACETAMINOPHEN 325MG 650 MG: 325 TABLET ORAL at 09:42

## 2024-07-07 RX ADMIN — ACETAMINOPHEN 325MG 650 MG: 325 TABLET ORAL at 15:28

## 2024-07-07 RX ADMIN — DIBASIC SODIUM PHOSPHATE, MONOBASIC POTASSIUM PHOSPHATE AND MONOBASIC SODIUM PHOSPHATE 2 TABLET: 852; 155; 130 TABLET ORAL at 09:42

## 2024-07-07 RX ADMIN — HEPARIN SODIUM 5000 UNITS: 5000 INJECTION INTRAVENOUS; SUBCUTANEOUS at 23:19

## 2024-07-07 RX ADMIN — SODIUM CHLORIDE, PRESERVATIVE FREE 10 ML: 5 INJECTION INTRAVENOUS at 09:46

## 2024-07-07 RX ADMIN — DIBASIC SODIUM PHOSPHATE, MONOBASIC POTASSIUM PHOSPHATE AND MONOBASIC SODIUM PHOSPHATE 2 TABLET: 852; 155; 130 TABLET ORAL at 15:28

## 2024-07-07 RX ADMIN — HEPARIN SODIUM 5000 UNITS: 5000 INJECTION INTRAVENOUS; SUBCUTANEOUS at 07:02

## 2024-07-07 ASSESSMENT — PAIN DESCRIPTION - ORIENTATION: ORIENTATION: RIGHT;LEFT

## 2024-07-07 ASSESSMENT — PAIN DESCRIPTION - DESCRIPTORS: DESCRIPTORS: ACHING

## 2024-07-07 ASSESSMENT — PAIN DESCRIPTION - LOCATION: LOCATION: ARM

## 2024-07-07 ASSESSMENT — PAIN SCALES - GENERAL
PAINLEVEL_OUTOF10: 4
PAINLEVEL_OUTOF10: 6

## 2024-07-07 NOTE — PLAN OF CARE
Problem: Discharge Planning  Goal: Discharge to home or other facility with appropriate resources  7/7/2024 0540 by Fátima Betancourt, RN  Outcome: Progressing  7/6/2024 1840 by Latonya Narayanan, RN  Outcome: Progressing     Problem: Safety - Adult  Goal: Free from fall injury  7/7/2024 0540 by Fátima Betancourt, RN  Outcome: Progressing  7/6/2024 1840 by Latonya Narayanan, RN  Outcome: Progressing

## 2024-07-07 NOTE — PROGRESS NOTES
patient kept trying to get up.    Patient seen at beside. Remains confused at who this writer was despite several attempts to explain. Patient oriented to self and place, not time. Patient repeatedly asked to have her walker so she can get out of bed.    Patient having difficulty hearing, daughter to bring hearing aids today.    Reports left lower extremity pain but unable to describe knee vs calf vs foot or point.    ROS (positive findings are in BOLD; negative findings are in regular font)  Constitutional: fevers, chills, appetite changes, weight changes, fatigue  HEENT: changes in vision, changes in hearing, sore throat, dysphagia  Cardiovascular: chest pain, palpitations, PND, orthopnea, edema  Pulmonary: SOB, cough, sputum production, wheezing, chest tightness  Gastrointestinal: abdominal pain, nausea/vomiting, diarrhea, constipation, melena, hematochezia  Genitourinary: dysuria, hesitation, dribbling, urgency, hematuria  Musculoskeletal: arthralgias, myalgias, left lower extremity pain  Skin: rash, itching  Neurological: sensory changes, motor changes, headache  Psychiatric: mood changes  Endocrine: heat/cold intolerance  Heme: easy bruising/easy bleeding, LAD    CURRENT INPATIENT MEDICATIONS:  Current Facility-Administered Medications   Medication Dose Route Frequency Provider Last Rate Last Admin    sodium chloride flush 0.9 % injection 5-40 mL  5-40 mL IntraVENous 2 times per day Hu Crocker MD   10 mL at 07/06/24 7193    sodium chloride flush 0.9 % injection 5-40 mL  5-40 mL IntraVENous PRN Hu Crocker MD        0.9 % sodium chloride infusion   IntraVENous PRN Hu Crocker MD        potassium chloride (KLOR-CON M) extended release tablet 40 mEq  40 mEq Oral PRN Hu Crocker MD        Or    potassium bicarb-citric acid (EFFER-K) effervescent tablet 40 mEq  40 mEq Oral PRN Hu Crocker MD        Or    potassium chloride 10 mEq/100 mL IVPB (Peripheral Line)  10 mEq  IntraVENous PRN Hu Crocker MD        magnesium sulfate 2000 mg in 50 mL IVPB premix  2,000 mg IntraVENous PRN Hu Crocker MD        ondansetron (ZOFRAN-ODT) disintegrating tablet 4 mg  4 mg Oral Q8H PRN Hu Crocker MD        Or    ondansetron (ZOFRAN) injection 4 mg  4 mg IntraVENous Q6H PRN Hu Crocker MD        polyethylene glycol (GLYCOLAX) packet 17 g  17 g Oral Daily PRN Hu Crocker MD        acetaminophen (TYLENOL) tablet 650 mg  650 mg Oral Q6H PRN Hu Crocker MD        Or    acetaminophen (TYLENOL) suppository 650 mg  650 mg Rectal Q6H PRN Hu Crocker MD        heparin (porcine) injection 5,000 Units  5,000 Units SubCUTAneous 3 times per day Hu Crocker MD   5,000 Units at 07/06/24 1550    lactated ringers IV soln infusion   IntraVENous Continuous Jose Smiley  mL/hr at 07/06/24 1954 Rate Verify at 07/06/24 1954       OBJECTIVE:    Intake/Output Summary (Last 24 hours) at 7/7/2024 0648  Last data filed at 7/7/2024 0315  Gross per 24 hour   Intake 1128.45 ml   Output 800 ml   Net 328.45 ml       /74   Pulse 89   Temp 99 °F (37.2 °C) (Oral)   Resp 16   Ht 1.727 m (5' 8\")   Wt 81.6 kg (180 lb)   SpO2 92%   BMI 27.37 kg/m²     PHYSICAL EXAM  Gen: mildly agitated  HEENT: normocephalic, atraumatic, MMM, no thyromegaly, no JVD  CV: irregular rhythm, S1/S2 present without M/R/G, +2 radial pulses, well-perfused  Pulm: CTAB, no wheezes, no crackles  Abd: S/NT/ND, no rebound, no guarding  MSK: no clubbing, no edema, no pain with calf squeeze bilaterally  Skin: warm, dry, intact, no rash  Neuro: CN II-XII grossly intact, no focal deficits appreciated   Psych: appropriate, alert, oriented x3    LABWORK (LAST 24 HOURS)  Recent Results (from the past 24 hour(s))   Magnesium    Collection Time: 07/06/24  7:24 AM   Result Value Ref Range    Magnesium 2.0 1.6 - 2.6 mg/dL   Phosphorus    Collection Time: 07/06/24  7:24 AM

## 2024-07-07 NOTE — PROGRESS NOTES
[facility]   BRIEF PROGRESS NOTE    0245 - MD paged due to patient being agitated, trying to get out of bed. On exam the patient was mildly confused. Per nurse the patient has not slept tonight, has tried to get out of bed multiple times, and is not easily redirectable. Presentation consistent with sundowing, 5mg Olanzapine ordered.     Donnie Jessica MD, PGY-1  [facility]  7/7/2024, 2:54 AM

## 2024-07-07 NOTE — PLAN OF CARE
Problem: Physical Therapy - Adult  Goal: By Discharge: Performs mobility at highest level of function for planned discharge setting.  See evaluation for individualized goals.  Description: Physical Therapy Goals:  Initiated 7/7/2024 to be met within 7-10 days.    1.  Patient will move from supine to sit and sit to supine  in bed with minimal assistance/contact guard assist.    2.  Patient will transfer from bed to chair and chair to bed with minimal assistance/contact guard assist using the least restrictive device.  3.  Patient will perform sit to stand with minimal assistance/contact guard assist.  4.  Patient will ambulate with minimal assistance/contact guard assist for 50 feet with the least restrictive device.   5.  Patient will ascend/descend 3 stairs with (U) handrail(s) with minimal assistance/contact guard assist.    PLOF: Mod (I) with RW. Pt has family next door who checks on her as well as other friends. Pt is occasionally alone in the evening. Per pt's daughter, family sets up meals for her but generally patient is still able to ambulate within home and feed her cat.      Outcome: Progressing     PHYSICAL THERAPY EVALUATION    Patient: Tara Fernandes (89 y.o. female)  Date: 7/7/2024  Primary Diagnosis: Rhabdomyolysis [M62.82]  AMS (altered mental status) [R41.82]       Precautions: Fall Risk,  ,  ,  ,  ,  ,  ,      ASSESSMENT :  Pt presents with impaired functional mobility, decreased activity tolerance, and generalized weakness 2/2 admission for rhabdomyolysis and AMS. Pt moaning upon arrival, only briefly opened eyes to verbal and tactile stimuli. Daughter entered room at the same time as therapist and noted she is normally very \"with it\" and this is not her normal mentation. Daughter educated on mentation change with possible infections and/or medications, verbalized understanding. Pt tender to touch throughout body, jumping at the slightest touch on BLE and BUE. Pt able to roll to (L) with total

## 2024-07-07 NOTE — PROGRESS NOTES
4 Eyes Skin Assessment     NAME:  Tara Fernandes  YOB: 1931  MEDICAL RECORD NUMBER:  [id number]    The patient is being assessed for  Shift Handoff    I agree that at least one RN has performed a thorough Head to Toe Skin Assessment on the patient. ALL assessment sites listed below have been assessed.      Areas assessed by both nurses:    Head, Face, Ears, Shoulders, Back, Chest, Arms, Elbows, Hands, Sacrum. Buttock, Coccyx, Ischium, and Legs. Feet and Heels        Does the Patient have a Wound? Yes wound(s) were present on assessment. LDA wound assessment was Initiated and completed by RN       Sesar Prevention initiated by RN: Yes  Wound Care Orders initiated by RN: No    Pressure Injury (Stage 3,4, Unstageable, DTI, NWPT, and Complex wounds) if present, place Wound referral order by RN under : No    New Ostomies, if present place, Ostomy referral order under : No     Nurse 1 eSignature: Electronically signed by Fátima Betancourt RN on 7/7/24 at 7:37 AM EDT    **SHARE this note so that the co-signing nurse can place an eSignature**    Nurse 2 eSignature: {Esignature:[id number]}

## 2024-07-07 NOTE — DISCHARGE SUMMARY
patient  size (including appropriate matching first site specific examinations), or use  of iterative reconstruction technique.    FINDINGS:    No focal mass effect or shift of midline structures. No abnormal extra-axial  collection. Ventricles are normal in size and configuration. Mild  periventricular low-attenuation. No acute intracranial hemorrhage. ICA siphon  calcifications.    Imaged paranasal sinuses and mastoids well-aerated. Calvarium intact.  Superficial soft tissues unremarkable.    Impression  No clearly acute intracranial findings.    Mild burden of periventricular low-attenuation, likely chronic microvascular  ischemic change.    Electronically signed by Josué Hillman     Xray Result (most recent):  XR HAND LEFT (MIN 3 VIEWS) 07/07/2024    Narrative  EXAM: XR HAND LEFT (MIN 3 VIEWS)    CLINICAL INDICATION/HISTORY: New-onset severe pain with movement and  manipulation of left (dominant) hand. Unable to use it to eat.    TECHNIQUE: 3 views of the left hand    COMPARISON: None    FINDINGS:    No acute fracture or malalignment. Severe joint space narrowing at the  triscaphe, first CMC and second MCP joints with associated subchondral  sclerosis. Chondrocalcinosis at the second MCP joint and at the TFCC. No osseous  erosions.    Impression  Severe degenerative disease at the triscaphe, first CMC and second MCP joints.  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        Electronically signed by Orion Stewart    Ultrasound Result (most recent):  No results found for this or any previous visit from the past 3650 days.        Cardiology Procedures/Testing:  MODALITY RESULTS   EKG Encounter Date: 07/05/24   EKG 12 Lead   Result Value    Ventricular Rate 91    Atrial Rate 91    P-R Interval 158    QRS Duration 72    Q-T Interval 334    QTc Calculation (Bazett) 410    P Axis 35    R Axis 44    T Axis 38  results found for: \"LACTA\"       CARDIAC PANEL Lab Results   Component Value Date    CKTOTAL 1,635 (H) 07/07/2024    TROPONINI 0.02 11/26/2021    TROPHS 63 (H) 03/21/2024      NT-proBNP No results found for: \"BNP\"   THYROID No results found for: \"TSH\", \"J4KZSCY\", \"THYROIDAB\", \"FT3\", \"T4FREE\"     Readmission Risk              Risk of Unplanned Readmission:  10       %      ***[REMOVE lab results above that have no entries and/or are out-of-date/irrelevant!]    Raina Trevino MD, PGY-***  [facility]  7/7/2024 4:38 PM    ***[REFRESH and UPDATE Signature, Cosigner, and Date and Time of Service above before signing!]

## 2024-07-07 NOTE — PROGRESS NOTES
Patient refused several times to receive a new iv after her already inserted iv went bad. Patients daughter asked if patient could sleep for a while before re attempting to place another iv.

## 2024-07-07 NOTE — PROGRESS NOTES
[facility]  POST-ROUNDING NOTE    Assessment & Plan:  -CK decreased to less than 2000, follow up tomorrow AM labs  -Decrease fluids to 75mL/hr    -Venous doppler of left lower extremity: Acute non-occlusive deep vein thrombosis in 2 of 2 peroneal veins within the left lower extremity.   -Continue subcutaneous heparin    -Patient had notable pain to manipulation of left wrist and hand, X-ray obtained which showed significant degenerative disease which likely represents a combination of osteoarthritis and CPPD arthropathy  -Scheduled tylenol q6h     -EKG: sinus rhythm with PACs      The above patient and plan were discussed with my supervising physician.     See daily progress note for full assessment/plan.      Raina Trevino MD, PGY-1  [facility]  7/7/2024, 1:39 PM

## 2024-07-08 ENCOUNTER — APPOINTMENT (OUTPATIENT)
Facility: HOSPITAL | Age: 89
End: 2024-07-08
Payer: MEDICARE

## 2024-07-08 LAB
ANION GAP SERPL CALC-SCNC: 7 MMOL/L (ref 3–18)
BASOPHILS # BLD: 0 K/UL (ref 0–0.1)
BASOPHILS NFR BLD: 0 % (ref 0–2)
BUN SERPL-MCNC: 17 MG/DL (ref 7–18)
BUN/CREAT SERPL: 27 (ref 12–20)
CALCIUM SERPL-MCNC: 8 MG/DL (ref 8.5–10.1)
CHLORIDE SERPL-SCNC: 112 MMOL/L (ref 100–111)
CK SERPL-CCNC: 504 U/L (ref 26–192)
CO2 SERPL-SCNC: 25 MMOL/L (ref 21–32)
CREAT SERPL-MCNC: 0.64 MG/DL (ref 0.6–1.3)
DIFFERENTIAL METHOD BLD: ABNORMAL
EOSINOPHIL # BLD: 0.3 K/UL (ref 0–0.4)
EOSINOPHIL NFR BLD: 3 % (ref 0–5)
ERYTHROCYTE [DISTWIDTH] IN BLOOD BY AUTOMATED COUNT: 14.4 % (ref 11.6–14.5)
GLUCOSE SERPL-MCNC: 111 MG/DL (ref 74–99)
HCT VFR BLD AUTO: 32.4 % (ref 35–45)
HGB BLD-MCNC: 10.7 G/DL (ref 12–16)
IMM GRANULOCYTES # BLD AUTO: 0 K/UL (ref 0–0.04)
IMM GRANULOCYTES NFR BLD AUTO: 0 % (ref 0–0.5)
LYMPHOCYTES # BLD: 1.4 K/UL (ref 0.9–3.6)
LYMPHOCYTES NFR BLD: 16 % (ref 21–52)
MAGNESIUM SERPL-MCNC: 1.9 MG/DL (ref 1.6–2.6)
MCH RBC QN AUTO: 29.5 PG (ref 24–34)
MCHC RBC AUTO-ENTMCNC: 33 G/DL (ref 31–37)
MCV RBC AUTO: 89.3 FL (ref 78–100)
MONOCYTES # BLD: 0.9 K/UL (ref 0.05–1.2)
MONOCYTES NFR BLD: 11 % (ref 3–10)
NEUTS SEG # BLD: 6.1 K/UL (ref 1.8–8)
NEUTS SEG NFR BLD: 70 % (ref 40–73)
NRBC # BLD: 0 K/UL (ref 0–0.01)
NRBC BLD-RTO: 0 PER 100 WBC
PHOSPHATE SERPL-MCNC: 3.3 MG/DL (ref 2.5–4.9)
PLATELET # BLD AUTO: 289 K/UL (ref 135–420)
PMV BLD AUTO: 10 FL (ref 9.2–11.8)
POTASSIUM SERPL-SCNC: 3 MMOL/L (ref 3.5–5.5)
RBC # BLD AUTO: 3.63 M/UL (ref 4.2–5.3)
SODIUM SERPL-SCNC: 144 MMOL/L (ref 136–145)
WBC # BLD AUTO: 8.7 K/UL (ref 4.6–13.2)

## 2024-07-08 PROCEDURE — 97166 OT EVAL MOD COMPLEX 45 MIN: CPT

## 2024-07-08 PROCEDURE — 83735 ASSAY OF MAGNESIUM: CPT

## 2024-07-08 PROCEDURE — 36415 COLL VENOUS BLD VENIPUNCTURE: CPT

## 2024-07-08 PROCEDURE — 80048 BASIC METABOLIC PNL TOTAL CA: CPT

## 2024-07-08 PROCEDURE — 73560 X-RAY EXAM OF KNEE 1 OR 2: CPT

## 2024-07-08 PROCEDURE — 6370000000 HC RX 637 (ALT 250 FOR IP)

## 2024-07-08 PROCEDURE — 97535 SELF CARE MNGMENT TRAINING: CPT

## 2024-07-08 PROCEDURE — 73590 X-RAY EXAM OF LOWER LEG: CPT

## 2024-07-08 PROCEDURE — 73552 X-RAY EXAM OF FEMUR 2/>: CPT

## 2024-07-08 PROCEDURE — 20610 DRAIN/INJ JOINT/BURSA W/O US: CPT | Performed by: ORTHOPAEDIC SURGERY

## 2024-07-08 PROCEDURE — 1100000000 HC RM PRIVATE

## 2024-07-08 PROCEDURE — 82550 ASSAY OF CK (CPK): CPT

## 2024-07-08 PROCEDURE — 99223 1ST HOSP IP/OBS HIGH 75: CPT | Performed by: ORTHOPAEDIC SURGERY

## 2024-07-08 PROCEDURE — 6360000002 HC RX W HCPCS

## 2024-07-08 PROCEDURE — 84100 ASSAY OF PHOSPHORUS: CPT

## 2024-07-08 PROCEDURE — 2580000003 HC RX 258

## 2024-07-08 PROCEDURE — 85025 COMPLETE CBC W/AUTO DIFF WBC: CPT

## 2024-07-08 PROCEDURE — 94761 N-INVAS EAR/PLS OXIMETRY MLT: CPT

## 2024-07-08 RX ADMIN — POTASSIUM CHLORIDE 10 MEQ: 7.46 INJECTION, SOLUTION INTRAVENOUS at 13:08

## 2024-07-08 RX ADMIN — SODIUM CHLORIDE, PRESERVATIVE FREE 10 ML: 5 INJECTION INTRAVENOUS at 21:58

## 2024-07-08 RX ADMIN — HEPARIN SODIUM 5000 UNITS: 5000 INJECTION INTRAVENOUS; SUBCUTANEOUS at 08:52

## 2024-07-08 RX ADMIN — HEPARIN SODIUM 5000 UNITS: 5000 INJECTION INTRAVENOUS; SUBCUTANEOUS at 13:09

## 2024-07-08 RX ADMIN — POTASSIUM CHLORIDE 10 MEQ: 7.46 INJECTION, SOLUTION INTRAVENOUS at 08:51

## 2024-07-08 RX ADMIN — SODIUM CHLORIDE, POTASSIUM CHLORIDE, SODIUM LACTATE AND CALCIUM CHLORIDE: 600; 310; 30; 20 INJECTION, SOLUTION INTRAVENOUS at 11:28

## 2024-07-08 RX ADMIN — ACETAMINOPHEN 325MG 650 MG: 325 TABLET ORAL at 08:51

## 2024-07-08 RX ADMIN — POTASSIUM CHLORIDE 10 MEQ: 7.46 INJECTION, SOLUTION INTRAVENOUS at 10:17

## 2024-07-08 RX ADMIN — ACETAMINOPHEN 325MG 650 MG: 325 TABLET ORAL at 23:49

## 2024-07-08 RX ADMIN — POTASSIUM CHLORIDE 10 MEQ: 7.46 INJECTION, SOLUTION INTRAVENOUS at 11:31

## 2024-07-08 RX ADMIN — ACETAMINOPHEN 325MG 650 MG: 325 TABLET ORAL at 18:47

## 2024-07-08 RX ADMIN — POTASSIUM CHLORIDE 10 MEQ: 7.46 INJECTION, SOLUTION INTRAVENOUS at 14:17

## 2024-07-08 RX ADMIN — POTASSIUM CHLORIDE 10 MEQ: 7.46 INJECTION, SOLUTION INTRAVENOUS at 07:17

## 2024-07-08 ASSESSMENT — PAIN DESCRIPTION - DESCRIPTORS
DESCRIPTORS: ACHING
DESCRIPTORS: ACHING

## 2024-07-08 ASSESSMENT — PAIN SCALES - WONG BAKER
WONGBAKER_NUMERICALRESPONSE: NO HURT

## 2024-07-08 ASSESSMENT — PAIN DESCRIPTION - ORIENTATION
ORIENTATION: LEFT;RIGHT
ORIENTATION: RIGHT;LEFT

## 2024-07-08 ASSESSMENT — PAIN - FUNCTIONAL ASSESSMENT
PAIN_FUNCTIONAL_ASSESSMENT: PREVENTS OR INTERFERES SOME ACTIVE ACTIVITIES AND ADLS
PAIN_FUNCTIONAL_ASSESSMENT: PREVENTS OR INTERFERES SOME ACTIVE ACTIVITIES AND ADLS

## 2024-07-08 ASSESSMENT — PAIN SCALES - GENERAL
PAINLEVEL_OUTOF10: 0
PAINLEVEL_OUTOF10: 8
PAINLEVEL_OUTOF10: 0

## 2024-07-08 ASSESSMENT — PAIN DESCRIPTION - LOCATION
LOCATION: HAND
LOCATION: HAND;LEG

## 2024-07-08 NOTE — PROGRESS NOTES
[facility] Medicine  DAILY PROGRESS NOTE      Patient:    Tara Fernandes , 89 y.o. female   MRN:  [id number]  Room/Bed:  519/01  Admission Date:   7/5/2024  Code status:  DNR    Reason for Admission: Rhabdomyolysis, GISSEL  Barriers to Discharge: management of above  Anticipated Date of Discharge: 7/9/2024      ASSESSMENT AND PLAN  Tara Fernandes is a 89 y.o. year old female with PMH of a fall, arthritis, HTN, HLD and DVT admitted for Rhabdomyolysis [M62.82]  AMS (altered mental status) [R41.82].      Rhabdomyolysis  Elevated creatinine kinase  GISSEL, resolved  AMS  -Pt was brought to the ED after being found in her bathroom covered in feces.   -Daughter reports patient being confused and having general weakness.  -Laboratory results remarkable for 33 BUN and 2544 -> 3495 total CK. Suspect rhabdomyolysis and GISSEL.  -Low suspicion of a fall due to negative findings in head CT.  -Low suspicion of pneumonia and cardiac abnormalities due to negative findings in CXR, CBC and Troponin.  -UTI is probable due to small amount of leuk esterase found, but unlikely as Pt is asymptomatic.   -CK downtrending from peak 3495 to 504 7/8. Cr returned to baseline  Plan:  -Plan to discuss DC today considering normalizing renal function and downtrending CK  -continue 75 mL/hr LR  -trend CK and Cr  -hold atorvastatin  -daily BMP  -PT/OT    History of DVT  -History of multiple DVTs of left lower extremity, 2022  -Not currently on anticoagulation outpatient  Plan  -f/u venous duplex    HTN  HLD  -patient does not take any medications at home  Plan  -monitor BP     Global:  Code: DNR  IVF/Drips: none  I/O / Wt: per protocol  Diet: regular adult diet  DVT/AC: sqh  Mobility: per protocol   Disposition: tbd  Anticipated LOS: 3 days     Point of Contact (relationship, number): Clair, her daughter  [phone]      SUBJECTIVE:   Events of the last 24 hours:    No acute events overnight. Seen at bedside this morning. Patient asleep but awakes on

## 2024-07-08 NOTE — PROGRESS NOTES
During my initial assessment found patient covered in urine. While cleaning patient a stage 1 ulcer on sacrum was discovered. A mepilex was placed, wound was documented on LDA, and a wound consult was ordered.

## 2024-07-08 NOTE — PLAN OF CARE
Problem: Skin/Tissue Integrity  Goal: Absence of new skin breakdown  Outcome: Progressing     Problem: Neurosensory - Adult  Goal: Achieves stable or improved neurological status  Outcome: Progressing     Problem: Cardiovascular - Adult  Goal: Maintains optimal cardiac output and hemodynamic stability  Outcome: Progressing     Problem: Skin/Tissue Integrity - Adult  Goal: Skin integrity remains intact  Outcome: Progressing     Problem: Musculoskeletal - Adult  Goal: Return mobility to safest level of function  Outcome: Progressing     Problem: Infection - Adult  Goal: Absence of infection at discharge  Outcome: Progressing

## 2024-07-08 NOTE — CONSULTS
with Office Follow-up    lactated ringers bolus 1,000 mL    lactated ringers bolus 1,000 mL    sodium chloride flush 0.9 % injection 5-40 mL    sodium chloride flush 0.9 % injection 5-40 mL    0.9 % sodium chloride infusion    OR Linked Order Group     potassium chloride (KLOR-CON M) extended release tablet 40 mEq     potassium bicarb-citric acid (EFFER-K) effervescent tablet 40 mEq     potassium chloride 10 mEq/100 mL IVPB (Peripheral Line)    magnesium sulfate 2000 mg in 50 mL IVPB premix    OR Linked Order Group     ondansetron (ZOFRAN-ODT) disintegrating tablet 4 mg     ondansetron (ZOFRAN) injection 4 mg    polyethylene glycol (GLYCOLAX) packet 17 g    DISCONTD: acetaminophen (TYLENOL) tablet 650 mg    DISCONTD: acetaminophen (TYLENOL) suppository 650 mg    heparin (porcine) injection 5,000 Units    DISCONTD: 0.9 % sodium chloride infusion    lactated ringers IV soln infusion    OR Linked Order Group     OLANZapine zydis (ZYPREXA) disintegrating tablet 5 mg     OLANZapine (ZyPREXA) 5 mg in sterile water 1 mL injection    phosphorus (K PHOS NEUTRAL) 2 tablet    OR Linked Order Group     acetaminophen (TYLENOL) tablet 650 mg     acetaminophen (TYLENOL) suppository 650 mg    Vascular duplex lower extremity venous left     Standing Status:   Standing     Number of Occurrences:   1        There are no outpatient Patient Instructions on file for this admission.            PROCEDURE:  Injection of the left  INTRAARTICULAR    left knee(s) INJECTION       I, Julio Escamilla MD, have reviewed the History, Physical and updated the Allergic reactions for Tara Fernandes     TIME OUT performed immediately prior to start of procedure:       * Patient was identified by name Tara Fernandes and date of birth (11/14/1931)  * Agreement on procedure being performed was verified  * Risks and Benefits explained to the patient: see below  * Procedure site verified and marked as necessary  * Patient was positioned for comfort  *  mistakes are unintentional.

## 2024-07-08 NOTE — PROGRESS NOTES
4 Eyes Skin Assessment     NAME:  Tara Fernandes  YOB: 1931  MEDICAL RECORD NUMBER:  [id number]    The patient is being assessed for  Shift Handoff    I agree that at least one RN has performed a thorough Head to Toe Skin Assessment on the patient. ALL assessment sites listed below have been assessed.      Areas assessed by both nurses:    Head, Face, Ears, Shoulders, Back, Chest, Arms, Elbows, Hands, Sacrum. Buttock, Coccyx, Ischium, Legs. Feet and Heels, and Under Medical Devices         Does the Patient have a Wound? Yes wound(s) were present on assessment. LDA wound assessment was Initiated and completed by RN       Sesar Prevention initiated by RN: Yes  Wound Care Orders initiated by RN: Yes    Pressure Injury (Stage 3,4, Unstageable, DTI, NWPT, and Complex wounds) if present, place Wound referral order by RN under : No    New Ostomies, if present place, Ostomy referral order under : No     Nurse 1 eSignature: Electronically signed by Latonya Narayanan RN on 7/7/24 at 8:54 PM EDT    **SHARE this note so that the co-signing nurse can place an eSignature**    Nurse 2 eSignature: {Esignature:[id number]}

## 2024-07-08 NOTE — CONSULTS
Room #: 519      INESSA: [id number]      Situation: Wound Care Consult    Background:    PMH:   Active Ambulatory Problems     Diagnosis Date Noted    Rhabdomyolysis 04/01/2019    Essential hypertension, benign 05/13/2013     Resolved Ambulatory Problems     Diagnosis Date Noted    No Resolved Ambulatory Problems     Past Medical History:   Diagnosis Date    Essential hypertension     Hyperlipidemia     Other premature beats 5/13/2013        Sesar Score: 12/23   BMI: Body mass index is 27.37 kg/m².   Preventive measures in place: Purewick, silicone to sacrum. I added Glide sheet, booster pad, silicone to heels, floated heels. Removed diaper.    Assessment:   Patient found reclined.  Patient is Awake and alert, Oriented to person only, Forgetful, and Pleasant and conversant. Daughter at bedside throughout. She contacted Dr. Butterfield due to her mothers hands and left knee pain.      Wound(s) Description:           Wound 07/07/24 Perineum Posterior Open Red area (Active)   Wound Image   07/08/24 1452   Wound Etiology Other 07/08/24 1452   Dressing Status New dressing applied 07/08/24 1452   Wound Cleansed Soap and water 07/07/24 0942   Dressing/Treatment Foam 07/07/24 0942   Wound Length (cm) 2 cm 07/08/24 1452   Wound Width (cm) 1 cm 07/08/24 1452   Wound Surface Area (cm^2) 2 cm^2 07/08/24 1452   Wound Assessment Granulation tissue 07/08/24 1452   Drainage Amount Scant (moist but unmeasurable) 07/08/24 1452   Drainage Description Serosanguinous 07/08/24 1452   Odor None 07/08/24 1452   Nichole-wound Assessment Maceration 07/08/24 1452   Number of days: 1       Wound 07/08/24 Hand Posterior;Right Skin tear, type 4 (Active)   Wound Image   07/08/24 1454   Wound Etiology Traumatic 07/08/24 1454   Dressing Status New dressing applied 07/08/24 1454   Wound Cleansed Cleansed with saline 07/08/24 1454   Dressing/Treatment Xeroform;Non adherent;Gauze dressing/dressing sponge;Ace wrap 07/08/24 1454   Wound Length (cm) 2 cm  07/08/24 1454   Wound Width (cm) 3.4 cm 07/08/24 1454   Wound Surface Area (cm^2) 6.8 cm^2 07/08/24 1454   Wound Assessment Exposed structure tendon;Granulation tissue 07/08/24 1454   Drainage Amount Moderate (25-50%) 07/08/24 1454   Drainage Description Serosanguinous 07/08/24 1454   Odor None 07/08/24 1454   Nichole-wound Assessment Intact 07/08/24 1454   Wound Thickness Description not for Pressure Injury Full thickness 07/08/24 1454   Number of days: 0          Recommendations:    Wound care orders as follows: Silicone dressing to sacrum.  Change every 3 days and prn soilage.     Other orders: Xeroform, non-adherent then rolled gauze and light ace wrap to right hand daily.     Needs turning assistance, most recent Sesar score=12.       Supplies Used: Xeroform, gauze, non-adherent, ace wrap, heel silicone x 2, Glide sheet       Care turned over to nursing staff at this time.  Spoke with PF senior res regarding right hand skin tear.      Savi MILLIGANN, RN, WCC, COCN, CLIN IV  [facility]  Office: [phone]  Work Cell: [phone]

## 2024-07-08 NOTE — PLAN OF CARE
Problem: Occupational Therapy - Adult  Goal: By Discharge: Performs self-care activities at highest level of function for planned discharge setting.  See evaluation for individualized goals.  Description: Occupational Therapy Goals:  Initiated 7/8/2024 to be met within 7-10 days.    1.  Patient will perform self-feeding with supervision/set-up.   2.  Patient will perform grooming with supervision/set-up.  3.  Patient will perform upper body dressing  with supervision/set-up.  4.  Patient will perform toilet transfers with minimal assistance/contact guard assist.  5.  Patient will perform all aspects of toileting with minimal assistance/contact guard assist.  6.  Patient will participate in upper extremity therapeutic exercise/activities with supervision/set-up for 8-10 minutes to increase strength/endurance for ADLs.    7.  Patient will utilize energy conservation techniques during functional activities with verbal cues.    PLOF: Pt lives alone, dtr lives next door. Pt lives in one level home, 3 FRANK w/ rail, walk in shower. Uses RW for mobility, is independent w/ ADLs.      Outcome: Progressing     OCCUPATIONAL THERAPY EVALUATION    Patient: Tara Fernandes (89 y.o. female)  Date: 7/8/2024  Primary Diagnosis: Rhabdomyolysis [M62.82]  AMS (altered mental status) [R41.82]       Precautions: Fall Risk, General Precautions,    ASSESSMENT :  RN clears pt for OT evaluation. Pt oriented to self, place, date, not to situation. Pt follows one step commands w/ verbal cueing. Pt c/o BLE and BUE pain, 5/10. BUE strength grossly 3/5. Pt performs supine>sit EOB, max A and extended time, but once seated EOB pt immediately begins trying to return supine, stating \"This is as far as I can go.\" Pt assisted back to semi supine, max A. Max A to scoot towards HOB. Pt would benefit from continued OT treatment to increase independence and safety w/ ADLs and functional mobility. Pt left semi supine, all needs in reach and lines intact,

## 2024-07-09 ENCOUNTER — ANESTHESIA EVENT (OUTPATIENT)
Facility: HOSPITAL | Age: 89
End: 2024-07-09
Payer: MEDICARE

## 2024-07-09 ENCOUNTER — ANESTHESIA (OUTPATIENT)
Facility: HOSPITAL | Age: 89
End: 2024-07-09
Payer: MEDICARE

## 2024-07-09 LAB
ANION GAP SERPL CALC-SCNC: 6 MMOL/L (ref 3–18)
BASOPHILS # BLD: 0 K/UL (ref 0–0.1)
BASOPHILS NFR BLD: 0 % (ref 0–2)
BUN SERPL-MCNC: 16 MG/DL (ref 7–18)
BUN/CREAT SERPL: 22 (ref 12–20)
CALCIUM SERPL-MCNC: 8.3 MG/DL (ref 8.5–10.1)
CHLORIDE SERPL-SCNC: 111 MMOL/L (ref 100–111)
CO2 SERPL-SCNC: 25 MMOL/L (ref 21–32)
CREAT SERPL-MCNC: 0.74 MG/DL (ref 0.6–1.3)
DIFFERENTIAL METHOD BLD: ABNORMAL
EOSINOPHIL # BLD: 0.1 K/UL (ref 0–0.4)
EOSINOPHIL NFR BLD: 1 % (ref 0–5)
ERYTHROCYTE [DISTWIDTH] IN BLOOD BY AUTOMATED COUNT: 14.4 % (ref 11.6–14.5)
GLUCOSE SERPL-MCNC: 150 MG/DL (ref 74–99)
HCT VFR BLD AUTO: 33.5 % (ref 35–45)
HGB BLD-MCNC: 10.7 G/DL (ref 12–16)
IMM GRANULOCYTES # BLD AUTO: 0.1 K/UL (ref 0–0.04)
IMM GRANULOCYTES NFR BLD AUTO: 1 % (ref 0–0.5)
LYMPHOCYTES # BLD: 0.8 K/UL (ref 0.9–3.6)
LYMPHOCYTES NFR BLD: 7 % (ref 21–52)
MAGNESIUM SERPL-MCNC: 1.8 MG/DL (ref 1.6–2.6)
MCH RBC QN AUTO: 29.1 PG (ref 24–34)
MCHC RBC AUTO-ENTMCNC: 31.9 G/DL (ref 31–37)
MCV RBC AUTO: 91 FL (ref 78–100)
MONOCYTES # BLD: 0.9 K/UL (ref 0.05–1.2)
MONOCYTES NFR BLD: 8 % (ref 3–10)
NEUTS SEG # BLD: 9.1 K/UL (ref 1.8–8)
NEUTS SEG NFR BLD: 83 % (ref 40–73)
NRBC # BLD: 0 K/UL (ref 0–0.01)
NRBC BLD-RTO: 0 PER 100 WBC
PHOSPHATE SERPL-MCNC: 2.7 MG/DL (ref 2.5–4.9)
PLATELET # BLD AUTO: 305 K/UL (ref 135–420)
PMV BLD AUTO: 10.2 FL (ref 9.2–11.8)
POTASSIUM SERPL-SCNC: 3.7 MMOL/L (ref 3.5–5.5)
RBC # BLD AUTO: 3.68 M/UL (ref 4.2–5.3)
SODIUM SERPL-SCNC: 142 MMOL/L (ref 136–145)
WBC # BLD AUTO: 10.9 K/UL (ref 4.6–13.2)

## 2024-07-09 PROCEDURE — 1100000000 HC RM PRIVATE

## 2024-07-09 PROCEDURE — 2709999900 HC NON-CHARGEABLE SUPPLY: Performed by: ORTHOPAEDIC SURGERY

## 2024-07-09 PROCEDURE — 2500000003 HC RX 250 WO HCPCS: Performed by: NURSE ANESTHETIST, CERTIFIED REGISTERED

## 2024-07-09 PROCEDURE — 7100000001 HC PACU RECOVERY - ADDTL 15 MIN: Performed by: ORTHOPAEDIC SURGERY

## 2024-07-09 PROCEDURE — 84100 ASSAY OF PHOSPHORUS: CPT

## 2024-07-09 PROCEDURE — 2580000003 HC RX 258: Performed by: ORTHOPAEDIC SURGERY

## 2024-07-09 PROCEDURE — 80048 BASIC METABOLIC PNL TOTAL CA: CPT

## 2024-07-09 PROCEDURE — 2580000003 HC RX 258

## 2024-07-09 PROCEDURE — 3600000002 HC SURGERY LEVEL 2 BASE: Performed by: ORTHOPAEDIC SURGERY

## 2024-07-09 PROCEDURE — 2580000003 HC RX 258: Performed by: NURSE ANESTHETIST, CERTIFIED REGISTERED

## 2024-07-09 PROCEDURE — 6360000002 HC RX W HCPCS: Performed by: NURSE ANESTHETIST, CERTIFIED REGISTERED

## 2024-07-09 PROCEDURE — 6360000002 HC RX W HCPCS: Performed by: ORTHOPAEDIC SURGERY

## 2024-07-09 PROCEDURE — 94761 N-INVAS EAR/PLS OXIMETRY MLT: CPT

## 2024-07-09 PROCEDURE — 6370000000 HC RX 637 (ALT 250 FOR IP)

## 2024-07-09 PROCEDURE — 83735 ASSAY OF MAGNESIUM: CPT

## 2024-07-09 PROCEDURE — 36415 COLL VENOUS BLD VENIPUNCTURE: CPT

## 2024-07-09 PROCEDURE — 7100000000 HC PACU RECOVERY - FIRST 15 MIN: Performed by: ORTHOPAEDIC SURGERY

## 2024-07-09 PROCEDURE — 13160 SEC CLSR SURG WND/DEHSN XTN: CPT | Performed by: ORTHOPAEDIC SURGERY

## 2024-07-09 PROCEDURE — 3600000012 HC SURGERY LEVEL 2 ADDTL 15MIN: Performed by: ORTHOPAEDIC SURGERY

## 2024-07-09 PROCEDURE — 0HQGXZZ REPAIR LEFT HAND SKIN, EXTERNAL APPROACH: ICD-10-PCS | Performed by: ORTHOPAEDIC SURGERY

## 2024-07-09 PROCEDURE — 85025 COMPLETE CBC W/AUTO DIFF WBC: CPT

## 2024-07-09 PROCEDURE — 3700000000 HC ANESTHESIA ATTENDED CARE: Performed by: ORTHOPAEDIC SURGERY

## 2024-07-09 PROCEDURE — 3700000001 HC ADD 15 MINUTES (ANESTHESIA): Performed by: ORTHOPAEDIC SURGERY

## 2024-07-09 RX ORDER — FENTANYL CITRATE 50 UG/ML
25 INJECTION, SOLUTION INTRAMUSCULAR; INTRAVENOUS EVERY 5 MIN PRN
Status: DISCONTINUED | OUTPATIENT
Start: 2024-07-09 | End: 2024-07-09 | Stop reason: HOSPADM

## 2024-07-09 RX ORDER — SODIUM CHLORIDE, SODIUM LACTATE, POTASSIUM CHLORIDE, CALCIUM CHLORIDE 600; 310; 30; 20 MG/100ML; MG/100ML; MG/100ML; MG/100ML
INJECTION, SOLUTION INTRAVENOUS CONTINUOUS
Status: DISCONTINUED | OUTPATIENT
Start: 2024-07-09 | End: 2024-07-09 | Stop reason: HOSPADM

## 2024-07-09 RX ORDER — NALOXONE HYDROCHLORIDE 0.4 MG/ML
INJECTION, SOLUTION INTRAMUSCULAR; INTRAVENOUS; SUBCUTANEOUS PRN
Status: DISCONTINUED | OUTPATIENT
Start: 2024-07-09 | End: 2024-07-09 | Stop reason: HOSPADM

## 2024-07-09 RX ORDER — SODIUM CHLORIDE 0.9 % (FLUSH) 0.9 %
5-40 SYRINGE (ML) INJECTION PRN
Status: DISCONTINUED | OUTPATIENT
Start: 2024-07-09 | End: 2024-07-09 | Stop reason: HOSPADM

## 2024-07-09 RX ORDER — SODIUM CHLORIDE 0.9 % (FLUSH) 0.9 %
5-40 SYRINGE (ML) INJECTION EVERY 12 HOURS SCHEDULED
Status: DISCONTINUED | OUTPATIENT
Start: 2024-07-09 | End: 2024-07-09 | Stop reason: HOSPADM

## 2024-07-09 RX ORDER — SODIUM CHLORIDE 9 MG/ML
INJECTION, SOLUTION INTRAVENOUS PRN
Status: DISCONTINUED | OUTPATIENT
Start: 2024-07-09 | End: 2024-07-09 | Stop reason: HOSPADM

## 2024-07-09 RX ORDER — PROPOFOL 10 MG/ML
INJECTION, EMULSION INTRAVENOUS PRN
Status: DISCONTINUED | OUTPATIENT
Start: 2024-07-09 | End: 2024-07-09 | Stop reason: SDUPTHER

## 2024-07-09 RX ORDER — FENTANYL CITRATE 50 UG/ML
INJECTION, SOLUTION INTRAMUSCULAR; INTRAVENOUS PRN
Status: DISCONTINUED | OUTPATIENT
Start: 2024-07-09 | End: 2024-07-09 | Stop reason: SDUPTHER

## 2024-07-09 RX ORDER — OXYCODONE HYDROCHLORIDE 5 MG/1
5 TABLET ORAL EVERY 8 HOURS PRN
Status: DISCONTINUED | OUTPATIENT
Start: 2024-07-09 | End: 2024-07-11 | Stop reason: HOSPADM

## 2024-07-09 RX ORDER — ONDANSETRON 2 MG/ML
4 INJECTION INTRAMUSCULAR; INTRAVENOUS
Status: DISCONTINUED | OUTPATIENT
Start: 2024-07-09 | End: 2024-07-09 | Stop reason: HOSPADM

## 2024-07-09 RX ADMIN — WATER 2000 MG: 1 INJECTION, SOLUTION INTRAMUSCULAR; INTRAVENOUS; SUBCUTANEOUS at 12:09

## 2024-07-09 RX ADMIN — PROPOFOL 25 MG: 10 INJECTION, EMULSION INTRAVENOUS at 12:01

## 2024-07-09 RX ADMIN — FENTANYL CITRATE 25 MCG: 50 INJECTION INTRAMUSCULAR; INTRAVENOUS at 12:24

## 2024-07-09 RX ADMIN — ACETAMINOPHEN 325MG 650 MG: 325 TABLET ORAL at 06:21

## 2024-07-09 RX ADMIN — TRANEXAMIC ACID 1 G: 100 INJECTION, SOLUTION INTRAVENOUS at 12:30

## 2024-07-09 RX ADMIN — OXYCODONE HYDROCHLORIDE 5 MG: 5 TABLET ORAL at 20:59

## 2024-07-09 RX ADMIN — ACETAMINOPHEN 325MG 650 MG: 325 TABLET ORAL at 17:58

## 2024-07-09 RX ADMIN — SODIUM CHLORIDE, PRESERVATIVE FREE 10 ML: 5 INJECTION INTRAVENOUS at 20:47

## 2024-07-09 RX ADMIN — FENTANYL CITRATE 25 MCG: 50 INJECTION INTRAMUSCULAR; INTRAVENOUS at 11:59

## 2024-07-09 RX ADMIN — PROPOFOL 50 MG: 10 INJECTION, EMULSION INTRAVENOUS at 12:00

## 2024-07-09 RX ADMIN — SODIUM CHLORIDE, POTASSIUM CHLORIDE, SODIUM LACTATE AND CALCIUM CHLORIDE: 600; 310; 30; 20 INJECTION, SOLUTION INTRAVENOUS at 11:24

## 2024-07-09 ASSESSMENT — PAIN SCALES - GENERAL
PAINLEVEL_OUTOF10: 8
PAINLEVEL_OUTOF10: 2
PAINLEVEL_OUTOF10: 0
PAINLEVEL_OUTOF10: 7
PAINLEVEL_OUTOF10: 0

## 2024-07-09 ASSESSMENT — PAIN DESCRIPTION - FREQUENCY: FREQUENCY: INTERMITTENT

## 2024-07-09 ASSESSMENT — PAIN - FUNCTIONAL ASSESSMENT
PAIN_FUNCTIONAL_ASSESSMENT: 0-10
PAIN_FUNCTIONAL_ASSESSMENT: ACTIVITIES ARE NOT PREVENTED
PAIN_FUNCTIONAL_ASSESSMENT: ACTIVITIES ARE NOT PREVENTED

## 2024-07-09 ASSESSMENT — PAIN DESCRIPTION - PAIN TYPE: TYPE: SURGICAL PAIN

## 2024-07-09 ASSESSMENT — PAIN DESCRIPTION - DESCRIPTORS
DESCRIPTORS: DISCOMFORT
DESCRIPTORS: ACHING;THROBBING

## 2024-07-09 ASSESSMENT — PAIN SCALES - WONG BAKER: WONGBAKER_NUMERICALRESPONSE: NO HURT

## 2024-07-09 ASSESSMENT — PAIN DESCRIPTION - ORIENTATION: ORIENTATION: RIGHT

## 2024-07-09 ASSESSMENT — PAIN DESCRIPTION - ONSET: ONSET: ON-GOING

## 2024-07-09 ASSESSMENT — PAIN DESCRIPTION - LOCATION: LOCATION: HAND

## 2024-07-09 NOTE — PROGRESS NOTES
4 Eyes Skin Assessment     NAME:  Tara Fernandes  YOB: 1931  MEDICAL RECORD NUMBER:  [id number]    The patient is being assessed for  Shift Handoff    I agree that at least one RN has performed a thorough Head to Toe Skin Assessment on the patient. ALL assessment sites listed below have been assessed.      Areas assessed by both nurses:    Head, Face, Ears, Shoulders, Back, Chest, Arms, Elbows, Hands, Sacrum. Buttock, Coccyx, Ischium, and Legs. Feet and Heels        Does the Patient have a Wound? Yes wound(s) were present on assessment. LDA wound assessment was Initiated and completed by RN       Sesar Prevention initiated by RN: Yes  Wound Care Orders initiated by RN: No    Pressure Injury (Stage 3,4, Unstageable, DTI, NWPT, and Complex wounds) if present, place Wound referral order by RN under : No    New Ostomies, if present place, Ostomy referral order under : No     Nurse 1 eSignature: Electronically signed by Fátima Betancourt RN on 7/9/24 at 7:49 AM EDT    **SHARE this note so that the co-signing nurse can place an eSignature**    Nurse 2 eSignature: {Esignature:[id number]}

## 2024-07-09 NOTE — OP NOTE
Operative Note      Patient: Tara Fernandes  YOB: 1931  MRN: [id number]    Date of Procedure: 7/9/2024    Pre-Op Diagnosis Codes:     * Degloving injury of hand, left, initial encounter [S61.402A]    Post-Op Diagnosis: Same       Procedure(s):  RIGHT HAND DEBRIDEMENT AND IRRIGATION; PRIMARY CLOSURE    Surgeon(s):  Berhane Vargas DO    Assistant:   Surgical Assistant: Lev Wesley    Anesthesia: General    Estimated Blood Loss (mL): Minimal    Complications: None    Specimens:   * No specimens in log *    Implants:  * No implants in log *      Drains: * No LDAs found *    Findings:  Infection Present At Time Of Surgery (PATOS) (choose all levels that have infection present):  No infection present  Other Findings: Degloving laceration to the dorsum of the hand.    Detailed Description of Procedure:     Indications for procedure been outlined in the perioperative documentation, most notably being not amenable to conservative treatment.    Informed consent was obtained from the patient.  The risks and benefits of the procedure were discussed with the patient.  They include but are not limited to neurovascular injury, tendon/ligamentous injury, blood loss, infection, need for further surgery, hematoma, neuroma, seroma, chronic pain, chronic stiffness, complications from anesthesia including death, and the possibility of ann Covid.    After informed consent was obtained, the patient was taken back to the operative suite.  A tourniquet was applied to the operative extremity and it was prepped and draped in the normal sterile fashion.  The arm was exsanguinated and the tourniquet was elevated to 200 mmHg.    Attention was turned to the dorsum of the hand where the wound was cleansed first with Irrisept and then washed away with normal saline.  At this point the skin edges were evaluated and full-thickness skin flaps were elevated from the underlying tissue.  There was exposed tendon specifically

## 2024-07-09 NOTE — H&P
Update History & Physical    The patient's History and Physical of July 8, 2024 was reviewed with the patient and I examined the patient. There was no change. The surgical site was confirmed by the patient and me.     Plan: The risks, benefits, expected outcome, and alternative to the recommended procedure have been discussed with the patient. Patient understands and wants to proceed with the procedure.     Electronically signed by Berhane Vargas DO on 7/9/2024 at 11:24 AM

## 2024-07-09 NOTE — PLAN OF CARE
Problem: Discharge Planning  Goal: Discharge to home or other facility with appropriate resources  Outcome: Progressing     Problem: Safety - Adult  Goal: Free from fall injury  Outcome: Progressing     Problem: Skin/Tissue Integrity  Goal: Absence of new skin breakdown  Description: 1.  Monitor for areas of redness and/or skin breakdown  2.  Assess vascular access sites hourly  3.  Every 4-6 hours minimum:  Change oxygen saturation probe site  4.  Every 4-6 hours:  If on nasal continuous positive airway pressure, respiratory therapy assess nares and determine need for appliance change or resting period.  Outcome: Progressing     Problem: Neurosensory - Adult  Goal: Achieves stable or improved neurological status  Outcome: Progressing     Problem: Cardiovascular - Adult  Goal: Maintains optimal cardiac output and hemodynamic stability  Outcome: Progressing     Problem: Skin/Tissue Integrity - Adult  Goal: Skin integrity remains intact  Outcome: Progressing  Goal: Incisions, wounds, or drain sites healing without S/S of infection  Outcome: Progressing  Goal: Oral mucous membranes remain intact  Outcome: Progressing     Problem: Musculoskeletal - Adult  Goal: Return mobility to safest level of function  Outcome: Progressing     Problem: Infection - Adult  Goal: Absence of infection at discharge  Outcome: Progressing  Goal: Absence of infection during hospitalization  Outcome: Progressing     Problem: Occupational Therapy - Adult  Goal: By Discharge: Performs self-care activities at highest level of function for planned discharge setting.  See evaluation for individualized goals.  Description: Occupational Therapy Goals:  Initiated 7/8/2024 to be met within 7-10 days.    1.  Patient will perform self-feeding with supervision/set-up.   2.  Patient will perform grooming with supervision/set-up.  3.  Patient will perform upper body dressing  with supervision/set-up.  4.  Patient will perform toilet transfers with

## 2024-07-09 NOTE — PROGRESS NOTES
[facility] Medicine  DAILY PROGRESS NOTE      Patient:    Tara Fernandes , 89 y.o. female   MRN:  [id number]  Room/Bed:  519/01  Admission Date:   7/5/2024  Code status:  DNR    Reason for Admission: Rhabdomyolysis, GISSEL  Barriers to Discharge: management of above  Anticipated Date of Discharge: 7/9/2024      ASSESSMENT AND PLAN  Tara Fernandes is a 89 y.o. year old female with PMH of a fall, arthritis, HTN, HLD and DVT admitted for Rhabdomyolysis [M62.82]  AMS (altered mental status) [R41.82].      Rhabdomyolysis  Elevated creatinine kinase  GISSEL, resolved  AMS  -Pt was brought to the ED after being found in her bathroom covered in feces.   -Daughter reports patient being confused and having general weakness.  -Laboratory results remarkable for 33 BUN and 2544 -> 3495 total CK. Suspect rhabdomyolysis and GISSEL.  -Low suspicion of a fall due to negative findings in head CT.  -Low suspicion of pneumonia and cardiac abnormalities due to negative findings in CXR, CBC and Troponin.  -UTI is probable due to small amount of leuk esterase found, but unlikely as Pt is asymptomatic.   -CK downtrending from peak 3495 to 504 7/8. Cr returned to baseline  Plan:  -Plan to discuss DC today considering normalizing renal function and downtrending CK  -continue 75 mL/hr LR  -trend CK and Cr  -hold atorvastatin  -daily BMP  -PT/OT    History of DVT  -History of multiple DVTs of left lower extremity, 2022  -Not currently on anticoagulation outpatient  Plan  -f/u venous duplex    HTN  HLD  -patient does not take any medications at home  Plan  -monitor BP     Global:  Code: DNR  IVF/Drips: none  I/O / Wt: per protocol  Diet: regular adult diet  DVT/AC: sqh  Mobility: per protocol   Disposition: tbd  Anticipated LOS: 3 days     Point of Contact (relationship, number): Clair, her daughter  [phone]      SUBJECTIVE:   Events of the last 24 hours:***  No acute events overnight. Seen at bedside this morning. Patient asleep but awakes  sulfate 2000 mg in 50 mL IVPB premix  2,000 mg IntraVENous PRN Hu Crocker MD        ondansetron (ZOFRAN-ODT) disintegrating tablet 4 mg  4 mg Oral Q8H PRN Hu Crocker MD        Or    ondansetron (ZOFRAN) injection 4 mg  4 mg IntraVENous Q6H PRN Hu Crocker MD        polyethylene glycol (GLYCOLAX) packet 17 g  17 g Oral Daily PRN Hu Crocker MD        [Held by provider] heparin (porcine) injection 5,000 Units  5,000 Units SubCUTAneous 3 times per day Hu Crocker MD   5,000 Units at 07/08/24 1309    lactated ringers IV soln infusion   IntraVENous Continuous Jose Smiley MD 75 mL/hr at 07/08/24 1128 New Bag at 07/08/24 1128       OBJECTIVE:    Intake/Output Summary (Last 24 hours) at 7/9/2024 0616  Last data filed at 7/8/2024 1900  Gross per 24 hour   Intake 100 ml   Output 1100 ml   Net -1000 ml         /67   Pulse 78   Temp 97.3 °F (36.3 °C) (Oral)   Resp 16   Ht 1.727 m (5' 8\")   Wt 81.6 kg (180 lb)   SpO2 96%   BMI 27.37 kg/m²     PHYSICAL EXAM***  Gen: mildly agitated  HEENT: normocephalic, atraumatic, MMM, no thyromegaly, no JVD  CV: RRR  Pulm: CTAB, no wheezes, no crackles  Abd: S/NT/ND, no rebound, no guarding  MSK: no clubbing, no edema,  Skin: warm, dry, intact, no rash  Neuro: CN II-XII grossly intact, no focal deficits appreciated   Psych: appropriate, alert    LABWORK (LAST 24 HOURS)  Recent Results (from the past 24 hour(s))   Basic Metabolic Panel w/ Reflex to MG    Collection Time: 07/09/24  2:55 AM   Result Value Ref Range    Sodium 142 136 - 145 mmol/L    Potassium 3.7 3.5 - 5.5 mmol/L    Chloride 111 100 - 111 mmol/L    CO2 25 21 - 32 mmol/L    Anion Gap 6 3.0 - 18 mmol/L    Glucose 150 (H) 74 - 99 mg/dL    BUN 16 7.0 - 18 MG/DL    Creatinine 0.74 0.6 - 1.3 MG/DL    BUN/Creatinine Ratio 22 (H) 12 - 20      Est, Glom Filt Rate 76 >60 ml/min/1.73m2    Calcium 8.3 (L) 8.5 - 10.1 MG/DL   CBC with Auto Differential    Collection Time:

## 2024-07-09 NOTE — PROGRESS NOTES
flush 0.9 % injection 5-40 mL  5-40 mL IntraVENous PRN Hu Crocker MD        0.9 % sodium chloride infusion   IntraVENous PRN Hu Crocker MD        potassium chloride (KLOR-CON M) extended release tablet 40 mEq  40 mEq Oral PRN Hu Crocker MD        Or    potassium bicarb-citric acid (EFFER-K) effervescent tablet 40 mEq  40 mEq Oral PRN Hu Crocker MD        Or    potassium chloride 10 mEq/100 mL IVPB (Peripheral Line)  10 mEq IntraVENous PRN Hu Crocker  mL/hr at 07/08/24 1417 10 mEq at 07/08/24 1417    magnesium sulfate 2000 mg in 50 mL IVPB premix  2,000 mg IntraVENous PRN Hu Crocker MD        ondansetron (ZOFRAN-ODT) disintegrating tablet 4 mg  4 mg Oral Q8H PRN Hu Crocker MD        Or    ondansetron (ZOFRAN) injection 4 mg  4 mg IntraVENous Q6H PRN Hu Crocker MD        polyethylene glycol (GLYCOLAX) packet 17 g  17 g Oral Daily PRN Hu Crocker MD        [Held by provider] heparin (porcine) injection 5,000 Units  5,000 Units SubCUTAneous 3 times per day Hu Crocker MD   5,000 Units at 07/08/24 1309    lactated ringers IV soln infusion   IntraVENous Continuous Jose Smiley MD 75 mL/hr at 07/08/24 1128 New Bag at 07/08/24 1128     OBJECTIVE:    Intake/Output Summary (Last 24 hours) at 7/9/2024 0829  Last data filed at 7/9/2024 0824  Gross per 24 hour   Intake 200 ml   Output 2700 ml   Net -2500 ml       /70   Pulse 70   Temp 97.4 °F (36.3 °C) (Oral)   Resp 18   Ht 1.727 m (5' 8\")   Wt 81.6 kg (180 lb)   SpO2 95%   BMI 27.37 kg/m²     PHYSICAL EXAM  Gen: Calm, NAD  HEENT: normocephalic, atraumatic, MMM, no thyromegaly, no JVD  CV: RRR  Pulm: CTAB, no wheezes, no crackles  Abd: S/NT/ND, no rebound, no guarding  MSK: no clubbing, no edema, right hand dressing c/d/I  Skin: warm, dry, intact, no rash  Neuro: CN II-XII grossly intact, no focal deficits appreciated   Psych: appropriate,

## 2024-07-09 NOTE — PLAN OF CARE
Problem: Discharge Planning  Goal: Discharge to home or other facility with appropriate resources  Outcome: Progressing  Flowsheets (Taken 7/8/2024 0900 by Christina Tristan, RN)  Discharge to home or other facility with appropriate resources:   Identify barriers to discharge with patient and caregiver   Arrange for needed discharge resources and transportation as appropriate   Identify discharge learning needs (meds, wound care, etc)   Refer to discharge planning if patient needs post-hospital services based on physician order or complex needs related to functional status, cognitive ability or social support system     Problem: Safety - Adult  Goal: Free from fall injury  Outcome: Progressing  Flowsheets (Taken 7/9/2024 1900)  Free From Fall Injury:   Instruct family/caregiver on patient safety   Based on caregiver fall risk screen, instruct family/caregiver to ask for assistance with transferring infant if caregiver noted to have fall risk factors

## 2024-07-09 NOTE — PROGRESS NOTES
Physician Progress Note      PATIENT:               CHUY MOREIRA  CSN #:                  [id number]  :                       1931  ADMIT DATE:       2024 8:17 PM  DISCH DATE:  RESPONDING  PROVIDER #:        Heber Fields MD          QUERY TEXT:    Pt admitted with GISSEL, rhabdomyolysis. Pt noted to have AMS. If possible,   please document in the progress notes and discharge summary if you are   evaluating and / or treating any of the following:    The medical record reflects the following:  Risk Factors: HTN; HLD,  Clinical Indicators: noted on; \"Delirious overnight. Received Olanzapine\";   RN notes on : \"patient being agitated, trying to get out of bed. On exam   the patient was mildly confused. Per nurse the patient has not slept tonight,   has tried to get out of bed multiple times, and is not easily redirectable\"  Treatment: IVF 0.9% NaCl @ 100 mL/hr, Olanzapine, daily BMP, CBC, Mag, phos,   continue trending CK and Cr  Options provided:  -- Metabolic encephalopathy  -- Toxic metabolic encephalopathy  -- Delirium due to, Please specify cause.  -- Delirium  -- Other - I will add my own diagnosis  -- Disagree - Not applicable / Not valid  -- Disagree - Clinically unable to determine / Unknown  -- Refer to Clinical Documentation Reviewer    PROVIDER RESPONSE TEXT:    This patient has metabolic encephalopathy.    Query created by: Joyce Guaman on 2024 1:58 PM      Electronically signed by:  Heber Fields MD 2024 2:58 PM

## 2024-07-09 NOTE — PERIOP NOTE
TRANSFER - IN REPORT:    Verbal report received from Celi RICARDO on Tara ALEM [place]  being received from 519 for ordered procedure      Report consisted of patient's Situation, Background, Assessment and   Recommendations(SBAR).     Information from the following report(s) Nurse Handoff Report was reviewed with the receiving nurse.    Opportunity for questions and clarification was provided.      Assessment completed upon patient's arrival to unit and care assumed.

## 2024-07-09 NOTE — PROGRESS NOTES
[facility] Medicine   Post Procedure Check Note      Procedure:     Subjective:  Pt is s/p right hand debridement and irrigation with primary closure by Orthopedics. On interview patient denied pain, chest pain, dyspnea, nausea, abdominal pain. Pt's daughter at bedside. Pt's daughter expressed she is going to tour [facility] before making a final decision regarding dispo to short-term care. We had a short discussion about the risks and benefits of long term anticoagulation given the patients medical history. Patient has no acute concerns at this time but expressed she wanted to ice cream.    ROS (positive findings are in BOLD; negative findings are in regular font)  Constitutional: fevers, chills, appetite changes, weight changes, fatigue  HEENT: changes in vision, changes in hearing, sore throat, dysphagia  Cardiovascular: chest pain, palpitations, PND, orthopnea, edema  Pulmonary: SOB, cough, sputum production, wheezing, chest tightness  Gastrointestinal: abdominal pain, nausea/vomiting, diarrhea, constipation, melena, hematochezia  Genitourinary: dysuria, hesitation, dribbling, urgency, hematuria  Musculoskeletal: arthralgias, myalgias  Skin: rash, itching  Neurological: sensory changes, motor changes, headache  Psychiatric: mood changes  Endocrine: heat/cold intolerance  Heme: easy bruising/easy bleeding, LAD     Objective:    Vitals:    07/09/24 1630   BP: 138/69   Pulse: 70   Resp: 16   Temp: 97.6 °F (36.4 °C)   SpO2: 93%      PHYSICAL EXAM  Gen: NAD, comfortable   HEENT: normocephalic, atraumatic, MMM,  CV: RRR, S1/S2 present without M/R/G, +2 pedal pulses, well-perfused  Pulm: CTAB, no wheezes, no crackles  MSK: no clubbing, right hand dressing c/d/I with some edema present, no lower extremity edema appreciated  Skin: warm, dry, intact, no rash  Neuro: CN II-XII grossly intact, no focal deficits appreciated   Psych: alert, oriented in the context of dementia    Assessment/ Plan:  Pt is s/p right hand

## 2024-07-09 NOTE — PERIOP NOTE
Patient /Family /Designee has been informed that [facility] is not responsible for patient belongings per policy and the signed [facility] Patient Agreement document.  Personal items should be sent home or checked in with security.  Patient /Family /Designee selected the following action:                            []  Send personal items home with a family member or friend                                                 []  Check in personal items with security, excluding clothing                            [x]  Maintain personal items at the bedside, against recommendation                                 by Stanislav Grace [facility]                                   ** If patient /family /designee chooses to maintain personal items at the bedside,                                      Complete the patient belongings inventory in the EMR.       All items are in pt's room 519

## 2024-07-09 NOTE — ANESTHESIA POSTPROCEDURE EVALUATION
Department of Anesthesiology  Postprocedure Note    Patient: Tara Fernandes  MRN: [id number]  YOB: 1931  Date of evaluation: 7/9/2024    Procedure Summary       Date: 07/09/24 Room / Location: [facility] MAIN 06 / [facility] MAIN OR    Anesthesia Start: 1148 Anesthesia Stop: 1302    Procedure: RIGHT HAND DEBRIDEMENT AND IRRIGATION; PRIMARY CLOSURE (Right: Hand) Diagnosis:       Degloving injury of hand, left, initial encounter      (Degloving injury of hand, left, initial encounter [S61.402A])    Surgeons: Berhane Vargas DO Responsible Provider: Shivani Talavera MD    Anesthesia Type: General ASA Status: 3            Anesthesia Type: General    Eduard Phase I: Eduard Score: 10    Eduard Phase II:      Anesthesia Post Evaluation    Patient location during evaluation: bedside  Patient participation: complete - patient participated  Airway patency: patent  Cardiovascular status: hemodynamically stable  Respiratory status: acceptable  Hydration status: stable    No notable events documented.

## 2024-07-09 NOTE — ANESTHESIA PRE PROCEDURE
Department of Anesthesiology  Preprocedure Note       Name:  Tara Fernandes   Age:  89 y.o.  :  1931                                          MRN:  [id number]         Date:  2024      Surgeon: Surgeon(s):  Berhane Vargas DO    Procedure: Procedure(s):  RIGHT HAND DEBRIDEMENT AND IRRIGATION; PRIMARY CLOSURE VERSUS SKIN GRAFT    Medications prior to admission:   Prior to Admission medications    Medication Sig Start Date End Date Taking? Authorizing Provider   Multiple Vitamin (MULTIVITAMIN) tablet Take 1 tablet by mouth daily   Yes Provider, Duke, MD   aspirin 81 MG EC tablet Take 1 tablet by mouth daily   Yes Automatic Reconciliation, Ar   Calcium Carbonate-Vitamin D 600-5 MG-MCG CAPS Take 600 mg by mouth daily  Patient not taking: Reported on 2024   Yes Automatic Reconciliation, Ar   acetaminophen (TYLENOL) 325 MG tablet Take 2 tablets by mouth every 4 hours as needed  Patient not taking: Reported on 2024   Automatic Reconciliation, Ar   allopurinol (ZYLOPRIM) 100 MG tablet Take 1 tablet by mouth daily  Patient not taking: Reported on 2024    Automatic Reconciliation, Ar   atorvastatin (LIPITOR) 20 MG tablet Take 1 tablet by mouth  Patient not taking: Reported on 2024    Automatic Reconciliation, Ar   calcitonin (MIACALCIN) 200 UNIT/ACT nasal spray 1 spray intranasal daily, Alternate nostrils.  Patient not taking: Reported on 2024   Automatic Reconciliation, Ar   colchicine (COLCRYS) 0.6 MG tablet Take 1 tablet by mouth daily  Patient not taking: Reported on 2024    Automatic Reconciliation, Ar   diclofenac sodium (VOLTAREN) 1 % GEL Apply 4 g topically 4 times daily as needed  Patient not taking: Reported on 2024   Automatic Reconciliation, Ar   hypromellose 0.3 % GEL ophthalmic gel Apply 1 drop to eye 4 times daily as needed  Patient not taking: Reported on 2024    Automatic Reconciliation, Ar   ibuprofen (ADVIL;MOTRIN) 400 MG tablet Take

## 2024-07-09 NOTE — BRIEF OP NOTE
Brief Postoperative Note      Patient: Tara Fernandes  YOB: 1931  MRN: [id number]    Date of Procedure: 7/9/2024    Pre-Op Diagnosis Codes:     * Degloving injury of hand, right, initial encounter     Post-Op Diagnosis: Same       Procedure(s):  RIGHT HAND DEBRIDEMENT AND IRRIGATION; PRIMARY CLOSURE    Surgeon(s):  Berhane Vargas DO    Assistant:  Surgical Assistant: Lev Wesley    Anesthesia: General    Estimated Blood Loss (mL): less than 50     Complications: None    Specimens:   * No specimens in log *    Implants:  * No implants in log *      Drains: * No LDAs found *    Findings:  Infection Present At Time Of Surgery (PATOS) (choose all levels that have infection present):  No infection present  Other Findings: degloving laceration to dorsal hand skin    Electronically signed by Berhane Vargas DO on 7/9/2024 at 1:37 PM

## 2024-07-09 NOTE — PROGRESS NOTES
Attempted pt for OT tx X 2. Pt unable to be seen 2/2 DARY for procedure. Will continue to follow up. Thank you  ALDO Rainey/KARISSA

## 2024-07-10 LAB
ANION GAP SERPL CALC-SCNC: 6 MMOL/L (ref 3–18)
BASOPHILS # BLD: 0 K/UL (ref 0–0.1)
BASOPHILS NFR BLD: 0 % (ref 0–2)
BUN SERPL-MCNC: 15 MG/DL (ref 7–18)
BUN/CREAT SERPL: 22 (ref 12–20)
CALCIUM SERPL-MCNC: 8.3 MG/DL (ref 8.5–10.1)
CHLORIDE SERPL-SCNC: 109 MMOL/L (ref 100–111)
CO2 SERPL-SCNC: 25 MMOL/L (ref 21–32)
CREAT SERPL-MCNC: 0.67 MG/DL (ref 0.6–1.3)
DIFFERENTIAL METHOD BLD: ABNORMAL
ECHO BSA: 1.98 M2
EOSINOPHIL # BLD: 0.1 K/UL (ref 0–0.4)
EOSINOPHIL NFR BLD: 1 % (ref 0–5)
ERYTHROCYTE [DISTWIDTH] IN BLOOD BY AUTOMATED COUNT: 14.6 % (ref 11.6–14.5)
GLUCOSE SERPL-MCNC: 191 MG/DL (ref 74–99)
HCT VFR BLD AUTO: 31.6 % (ref 35–45)
HGB BLD-MCNC: 10.3 G/DL (ref 12–16)
IMM GRANULOCYTES # BLD AUTO: 0.1 K/UL (ref 0–0.04)
IMM GRANULOCYTES NFR BLD AUTO: 1 % (ref 0–0.5)
LYMPHOCYTES # BLD: 0.8 K/UL (ref 0.9–3.6)
LYMPHOCYTES NFR BLD: 9 % (ref 21–52)
MAGNESIUM SERPL-MCNC: 1.9 MG/DL (ref 1.6–2.6)
MCH RBC QN AUTO: 29.3 PG (ref 24–34)
MCHC RBC AUTO-ENTMCNC: 32.6 G/DL (ref 31–37)
MCV RBC AUTO: 90 FL (ref 78–100)
MONOCYTES # BLD: 0.7 K/UL (ref 0.05–1.2)
MONOCYTES NFR BLD: 7 % (ref 3–10)
NEUTS SEG # BLD: 7.2 K/UL (ref 1.8–8)
NEUTS SEG NFR BLD: 81 % (ref 40–73)
NRBC # BLD: 0 K/UL (ref 0–0.01)
NRBC BLD-RTO: 0 PER 100 WBC
PHOSPHATE SERPL-MCNC: 2.7 MG/DL (ref 2.5–4.9)
PLATELET # BLD AUTO: 318 K/UL (ref 135–420)
PMV BLD AUTO: 10 FL (ref 9.2–11.8)
POTASSIUM SERPL-SCNC: 3.9 MMOL/L (ref 3.5–5.5)
RBC # BLD AUTO: 3.51 M/UL (ref 4.2–5.3)
SODIUM SERPL-SCNC: 140 MMOL/L (ref 136–145)
WBC # BLD AUTO: 8.9 K/UL (ref 4.6–13.2)

## 2024-07-10 PROCEDURE — 6360000002 HC RX W HCPCS

## 2024-07-10 PROCEDURE — 6370000000 HC RX 637 (ALT 250 FOR IP)

## 2024-07-10 PROCEDURE — 1100000000 HC RM PRIVATE

## 2024-07-10 PROCEDURE — 84100 ASSAY OF PHOSPHORUS: CPT

## 2024-07-10 PROCEDURE — 2580000003 HC RX 258

## 2024-07-10 PROCEDURE — 94761 N-INVAS EAR/PLS OXIMETRY MLT: CPT

## 2024-07-10 PROCEDURE — 97535 SELF CARE MNGMENT TRAINING: CPT

## 2024-07-10 PROCEDURE — 93971 EXTREMITY STUDY: CPT | Performed by: SURGERY

## 2024-07-10 PROCEDURE — 80048 BASIC METABOLIC PNL TOTAL CA: CPT

## 2024-07-10 PROCEDURE — 36415 COLL VENOUS BLD VENIPUNCTURE: CPT

## 2024-07-10 PROCEDURE — 97110 THERAPEUTIC EXERCISES: CPT

## 2024-07-10 PROCEDURE — 85025 COMPLETE CBC W/AUTO DIFF WBC: CPT

## 2024-07-10 PROCEDURE — 83735 ASSAY OF MAGNESIUM: CPT

## 2024-07-10 RX ADMIN — ACETAMINOPHEN 325MG 650 MG: 325 TABLET ORAL at 23:59

## 2024-07-10 RX ADMIN — OXYCODONE HYDROCHLORIDE 5 MG: 5 TABLET ORAL at 21:16

## 2024-07-10 RX ADMIN — HEPARIN SODIUM 5000 UNITS: 5000 INJECTION INTRAVENOUS; SUBCUTANEOUS at 21:17

## 2024-07-10 RX ADMIN — ACETAMINOPHEN 325MG 650 MG: 325 TABLET ORAL at 17:18

## 2024-07-10 RX ADMIN — ACETAMINOPHEN 325MG 650 MG: 325 TABLET ORAL at 12:21

## 2024-07-10 RX ADMIN — ACETAMINOPHEN 325MG 650 MG: 325 TABLET ORAL at 05:57

## 2024-07-10 RX ADMIN — SODIUM CHLORIDE, PRESERVATIVE FREE 10 ML: 5 INJECTION INTRAVENOUS at 21:17

## 2024-07-10 RX ADMIN — ACETAMINOPHEN 325MG 650 MG: 325 TABLET ORAL at 00:13

## 2024-07-10 RX ADMIN — SODIUM CHLORIDE, PRESERVATIVE FREE 10 ML: 5 INJECTION INTRAVENOUS at 09:27

## 2024-07-10 RX ADMIN — HEPARIN SODIUM 5000 UNITS: 5000 INJECTION INTRAVENOUS; SUBCUTANEOUS at 14:19

## 2024-07-10 ASSESSMENT — PAIN - FUNCTIONAL ASSESSMENT
PAIN_FUNCTIONAL_ASSESSMENT: PREVENTS OR INTERFERES SOME ACTIVE ACTIVITIES AND ADLS
PAIN_FUNCTIONAL_ASSESSMENT: ACTIVITIES ARE NOT PREVENTED
PAIN_FUNCTIONAL_ASSESSMENT: PREVENTS OR INTERFERES SOME ACTIVE ACTIVITIES AND ADLS
PAIN_FUNCTIONAL_ASSESSMENT: ACTIVITIES ARE NOT PREVENTED

## 2024-07-10 ASSESSMENT — PAIN DESCRIPTION - ONSET
ONSET: GRADUAL
ONSET: ON-GOING

## 2024-07-10 ASSESSMENT — PAIN DESCRIPTION - LOCATION
LOCATION: ARM
LOCATION: KNEE;HAND
LOCATION: HAND;HIP
LOCATION: ARM

## 2024-07-10 ASSESSMENT — PAIN DESCRIPTION - DESCRIPTORS
DESCRIPTORS: ACHING;DISCOMFORT
DESCRIPTORS: ACHING;SORE
DESCRIPTORS: ACHING;DISCOMFORT
DESCRIPTORS: ACHING

## 2024-07-10 ASSESSMENT — PAIN DESCRIPTION - PAIN TYPE
TYPE: SURGICAL PAIN
TYPE: SURGICAL PAIN

## 2024-07-10 ASSESSMENT — PAIN SCALES - GENERAL
PAINLEVEL_OUTOF10: 0
PAINLEVEL_OUTOF10: 1
PAINLEVEL_OUTOF10: 0
PAINLEVEL_OUTOF10: 6
PAINLEVEL_OUTOF10: 0
PAINLEVEL_OUTOF10: 5
PAINLEVEL_OUTOF10: 2
PAINLEVEL_OUTOF10: 2
PAINLEVEL_OUTOF10: 0
PAINLEVEL_OUTOF10: 5
PAINLEVEL_OUTOF10: 0

## 2024-07-10 ASSESSMENT — PAIN DESCRIPTION - ORIENTATION
ORIENTATION: RIGHT;LEFT
ORIENTATION: RIGHT
ORIENTATION: RIGHT;LEFT
ORIENTATION: RIGHT

## 2024-07-10 ASSESSMENT — PAIN DESCRIPTION - FREQUENCY
FREQUENCY: INTERMITTENT
FREQUENCY: INTERMITTENT

## 2024-07-10 NOTE — CARE COORDINATION
Call made to [facility] [employer] transportation [phone], spoke with Antwon, will transport patient at 8:00 pm.  [facility] has 9:30 pm availability. [facility]

## 2024-07-10 NOTE — PLAN OF CARE
Problem: Discharge Planning  Goal: Discharge to home or other facility with appropriate resources  7/9/2024 2252 by Christina Mills RN  Outcome: Progressing  7/9/2024 2252 by Christina Mills RN  Outcome: Progressing  7/9/2024 1900 by Lili De La Cruz RN  Outcome: Progressing  Flowsheets (Taken 7/8/2024 0900 by Christina Tristan RN)  Discharge to home or other facility with appropriate resources:   Identify barriers to discharge with patient and caregiver   Arrange for needed discharge resources and transportation as appropriate   Identify discharge learning needs (meds, wound care, etc)   Refer to discharge planning if patient needs post-hospital services based on physician order or complex needs related to functional status, cognitive ability or social support system     Problem: Safety - Adult  Goal: Free from fall injury  7/9/2024 2252 by Christina Mills RN  Outcome: Progressing  7/9/2024 2252 by hCristina Mills RN  Outcome: Progressing  7/9/2024 1900 by Lili De La Cruz RN  Outcome: Progressing  Flowsheets (Taken 7/9/2024 1900)  Free From Fall Injury:   Instruct family/caregiver on patient safety   Based on caregiver fall risk screen, instruct family/caregiver to ask for assistance with transferring infant if caregiver noted to have fall risk factors     Problem: Skin/Tissue Integrity  Goal: Absence of new skin breakdown  Description: 1.  Monitor for areas of redness and/or skin breakdown  2.  Assess vascular access sites hourly  3.  Every 4-6 hours minimum:  Change oxygen saturation probe site  4.  Every 4-6 hours:  If on nasal continuous positive airway pressure, respiratory therapy assess nares and determine need for appliance change or resting period.  7/9/2024 2252 by Christina Mills RN  Outcome: Progressing  7/9/2024 2252 by Christina Mills RN  Outcome: Progressing     Problem: Neurosensory - Adult  Goal: Achieves stable or improved neurological status  Outcome: Progressing     Problem:

## 2024-07-10 NOTE — DISCHARGE SUMMARY
left femur.    Electronically signed by Glenn DUFFY Post    Ultrasound Result (most recent):  No results found for this or any previous visit from the past 3650 days.        Cardiology Procedures/Testing:  MODALITY RESULTS   EKG Encounter Date: 07/05/24   EKG 12 Lead   Result Value    Ventricular Rate 91    Atrial Rate 91    P-R Interval 158    QRS Duration 72    Q-T Interval 334    QTc Calculation (Bazett) 410    P Axis 35    R Axis 44    T Axis 38    Diagnosis      Sinus rhythm with premature atrial complexes  Otherwise normal ECG  When compared with ECG of 05-JUL-2024 20:53,  No significant change was found  Confirmed by Justice Butts MD ([id number]) on 7/7/2024 1:27:37 PM           Special Testing/Procedures:  MODALITY RESULTS   MICRO Results       Procedure Component Value Units Date/Time    Culture, Urine [[id number]] Collected: 07/06/24 0257    Order Status: Completed Specimen: Urine, clean catch Updated: 07/07/24 1411     Special Requests NO SPECIAL REQUESTS        Culture No growth (<1,000 CFU/ML)              UA Lab Results   Component Value Date/Time    APPEARANCE CLOUDY 07/06/2024 02:57 AM    COLORU DARK YELLOW 07/06/2024 02:57 AM    NITRU Negative 07/06/2024 02:57 AM    GLUCOSEU Negative 07/06/2024 02:57 AM    GLUCOSEU Negative 03/21/2024 05:46 PM    KETUA TRACE 07/06/2024 02:57 AM    UROBILINOGEN 1.0 07/06/2024 02:57 AM    BILIRUBINUR Negative 07/06/2024 02:57 AM         Laboratory Results:  LABORATORY RESULTS   HEMATOLOGY Lab Results   Component Value Date    WBC 8.9 07/10/2024    HGB 10.3 (L) 07/10/2024    HCT 31.6 (L) 07/10/2024    MCV 90.0 07/10/2024     07/10/2024       CHEMISTRIES Lab Results   Component Value Date/Time     07/10/2024 02:48 AM    K 3.9 07/10/2024 02:48 AM     07/10/2024 02:48 AM    CO2 25 07/10/2024 02:48 AM    BUN 15 07/10/2024 02:48 AM    CREATININE 0.67 07/10/2024 02:48 AM    GLUCOSE 191 07/10/2024 02:48 AM    CALCIUM 8.3 07/10/2024 02:48 AM    LABGLOM 82 07/10/2024  02:48 AM    LABGLOM >60 03/21/2024 12:48 PM        HEPATIC FUNCTION Lab Results   Component Value Date/Time    ALKPHOS 113 07/05/2024 09:05 PM    ALT 32 07/05/2024 09:05 PM    AST 88 07/05/2024 09:05 PM    BILITOT 1.2 07/05/2024 09:05 PM      LACTIC ACID No results found for: \"LACTA\"       CARDIAC PANEL Lab Results   Component Value Date    CKTOTAL 504 (H) 07/08/2024    TROPONINI 0.02 11/26/2021    TROPHS 63 (H) 03/21/2024      NT-proBNP No results found for: \"BNP\"   THYROID No results found for: \"TSH\", \"H1VNABO\", \"THYROIDAB\", \"FT3\", \"T4FREE\"     Readmission Risk              Risk of Unplanned Readmission:  11       %        Peyman Geronimo MS3  [facility]  7/10/2024 2:17 PM    ***[REFRESH and UPDATE Signature, Cosigner, and Date and Time of Service above before signing!]

## 2024-07-10 NOTE — PROGRESS NOTES
[facility]    Inpatient Progress Note      7/10/2024  8:33 AM  Chart reviewed.    Patient: Tara Fernandes MRN: [id number]  SSN: [id number]    YOB: 1931  Age: 89 y.o.  Sex: female      Primary attending: Heber Lee MD   Chief Complaint:   Chief Complaint   Patient presents with    Fatigue     Reason for consult:   Specialty Problems          Orthopedic Problems    * (Principal) Rhabdomyolysis            Interval events:   \"It doesn't hurt, it just feels swollen\"  No complaints this morning  Good appetite   Answers appropriately, but decreased hearing 2/2 not having hearing aids    Subjective:     Tara Fernandes is a 89 y.o. female who is 1 Day Post-Op status post Procedure(s):  RIGHT HAND DEBRIDEMENT AND IRRIGATION; PRIMARY CLOSURE for degloving wound of the right hand.      Objective:   Vital signs:   Patient Vitals for the past 24 hrs:   Temp Pulse Resp BP SpO2   07/10/24 0740 97.6 °F (36.4 °C) 71 19 (!) 154/78 94 %   07/10/24 0309 98.4 °F (36.9 °C) 77 16 129/62 93 %   07/10/24 0012 98 °F (36.7 °C) 79 16 (!) 144/66 92 %   24 2158 -- -- 16 -- --   24 98 °F (36.7 °C) 75 16 128/67 94 %   24 1630 97.6 °F (36.4 °C) 70 16 138/69 93 %   24 1411 97.4 °F (36.3 °C) 62 18 (!) 156/77 94 %   24 1330 -- 66 20 139/66 96 %   24 1320 -- 66 16 (!) 148/63 95 %   24 1310 -- 75 18 (!) 144/109 95 %   24 1300 97.8 °F (36.6 °C) 73 18 (!) 143/59 99 %   24 1121 97.6 °F (36.4 °C) 64 20 (!) 151/83 95 %     Temp (24hrs), Av.8 °F (36.6 °C), Min:97.4 °F (36.3 °C), Max:98.4 °F (36.9 °C)      Physical Exam:   GENERAL: Alert and Oriented x 3, NO acute distress  PSYCH: appropriate affect, no hallucinations  HEAD: normocephalic, atraumatic  NECK: no JVD, supple, no swelling  LUNG: no accessory muscle use, no audible wheezing    OPERATIVE EXTREMITY: right hand  Dressing CDI  Cap refill less than 2 seconds  Easily moves digits  in all planes  Edema noted about the digits    Labs:     Lab Results   Component Value Date    WBC 8.9 07/10/2024    HGB 10.3 (L) 07/10/2024    HCT 31.6 (L) 07/10/2024    MCV 90.0 07/10/2024     07/10/2024    LYMPHOPCT 9 (L) 07/10/2024    RBC 3.51 (L) 07/10/2024    MCH 29.3 07/10/2024    MCHC 32.6 07/10/2024    RDW 14.6 (H) 07/10/2024     No results found for: \"LABA1C\",  Lab Results   Component Value Date     07/10/2024    K 3.9 07/10/2024     07/10/2024    CO2 25 07/10/2024    BUN 15 07/10/2024    CREATININE 0.67 07/10/2024    GLUCOSE 191 (H) 07/10/2024    CALCIUM 8.3 (L) 07/10/2024    BILITOT 1.2 (H) 07/05/2024    ALKPHOS 113 07/05/2024    AST 88 (H) 07/05/2024    ALT 32 07/05/2024    LABGLOM 82 07/10/2024    GFRAA >60 03/02/2022    GLOB 4.8 (H) 07/05/2024     No results found for: \"INR\", \"PROTIME\"  No results found for: \"APTT\"     Imaging:   All available imaging relevant to the problem for which I am seeing this patient has been reviewed    Assessment/Plan:   Stable for dc from ortho standpoint    Keep dressing on until postop visit on 7/18/2024 -cover for showering    Recommend OT at facility for digit ROM and for adaptive techniques    Tara Fernandes verbalized understanding and questions were answered to the best of my knowledge and ability.      Signed by:   Anita Beltran PA-C  7/10/2024 at 8:33 AM    Note: This note was completed using voice recognition software.  Any typographical/name errors or mistakes are unintentional.

## 2024-07-10 NOTE — PLAN OF CARE
Problem: Discharge Planning  Goal: Discharge to home or other facility with appropriate resources  Outcome: Progressing  Flowsheets (Taken 7/10/2024 0915 by Christina Tristan, RN)  Discharge to home or other facility with appropriate resources:   Identify barriers to discharge with patient and caregiver   Arrange for needed discharge resources and transportation as appropriate   Identify discharge learning needs (meds, wound care, etc)   Refer to discharge planning if patient needs post-hospital services based on physician order or complex needs related to functional status, cognitive ability or social support system     Problem: Safety - Adult  Goal: Free from fall injury  Outcome: Progressing     Problem: Skin/Tissue Integrity  Goal: Absence of new skin breakdown  Description: 1.  Monitor for areas of redness and/or skin breakdown  2.  Assess vascular access sites hourly  3.  Every 4-6 hours minimum:  Change oxygen saturation probe site  4.  Every 4-6 hours:  If on nasal continuous positive airway pressure, respiratory therapy assess nares and determine need for appliance change or resting period.  Outcome: Progressing     Problem: Physical Therapy - Adult  Goal: By Discharge: Performs mobility at highest level of function for planned discharge setting.  See evaluation for individualized goals.  Description: Physical Therapy Goals:  Initiated 7/7/2024 to be met within 7-10 days.    1.  Patient will move from supine to sit and sit to supine  in bed with minimal assistance/contact guard assist.    2.  Patient will transfer from bed to chair and chair to bed with minimal assistance/contact guard assist using the least restrictive device.  3.  Patient will perform sit to stand with minimal assistance/contact guard assist.  4.  Patient will ambulate with minimal assistance/contact guard assist for 50 feet with the least restrictive device.   5.  Patient will ascend/descend 3 stairs with (U) handrail(s) with minimal

## 2024-07-10 NOTE — PLAN OF CARE
Problem: Discharge Planning  Goal: Discharge to home or other facility with appropriate resources  7/9/2024 2252 by Christina Mills RN  Outcome: Progressing  7/9/2024 1900 by Lili De La Cruz RN  Outcome: Progressing  Flowsheets (Taken 7/8/2024 0900 by Christina Tristan, RN)  Discharge to home or other facility with appropriate resources:   Identify barriers to discharge with patient and caregiver   Arrange for needed discharge resources and transportation as appropriate   Identify discharge learning needs (meds, wound care, etc)   Refer to discharge planning if patient needs post-hospital services based on physician order or complex needs related to functional status, cognitive ability or social support system     Problem: Safety - Adult  Goal: Free from fall injury  7/9/2024 2252 by Christina Mills RN  Outcome: Progressing  7/9/2024 1900 by Lili De La Cruz RN  Outcome: Progressing  Flowsheets (Taken 7/9/2024 1900)  Free From Fall Injury:   Instruct family/caregiver on patient safety   Based on caregiver fall risk screen, instruct family/caregiver to ask for assistance with transferring infant if caregiver noted to have fall risk factors     Problem: Skin/Tissue Integrity  Goal: Absence of new skin breakdown  Description: 1.  Monitor for areas of redness and/or skin breakdown  2.  Assess vascular access sites hourly  3.  Every 4-6 hours minimum:  Change oxygen saturation probe site  4.  Every 4-6 hours:  If on nasal continuous positive airway pressure, respiratory therapy assess nares and determine need for appliance change or resting period.  Outcome: Progressing     Problem: Skin/Tissue Integrity - Adult  Goal: Skin integrity remains intact  Outcome: Progressing  Flowsheets (Taken 7/9/2024 2251)  Skin Integrity Remains Intact:   Monitor for areas of redness and/or skin breakdown   Assess vascular access sites hourly  Goal: Incisions, wounds, or drain sites healing without S/S of infection  Outcome:  Progressing  Flowsheets (Taken 7/9/2024 2251)  Incisions, Wounds, or Drain Sites Healing Without Sign and Symptoms of Infection: ADMISSION and DAILY: Assess and document risk factors for pressure ulcer development  Goal: Oral mucous membranes remain intact  Outcome: Progressing     Problem: Musculoskeletal - Adult  Goal: Return mobility to safest level of function  Outcome: Progressing     Problem: Infection - Adult  Goal: Absence of infection at discharge  Outcome: Progressing     Problem: Pain  Goal: Verbalizes/displays adequate comfort level or baseline comfort level  Outcome: Progressing  Flowsheets (Taken 7/9/2024 2059)  Verbalizes/displays adequate comfort level or baseline comfort level:   Encourage patient to monitor pain and request assistance   Assess pain using appropriate pain scale   Administer analgesics based on type and severity of pain and evaluate response   Implement non-pharmacological measures as appropriate and evaluate response

## 2024-07-10 NOTE — CARE COORDINATION
Facility is unable to accept patient at 8:00 pm. Call made to [facility], will transport patient on 7/11/2024 at 12:00 pm.

## 2024-07-10 NOTE — CARE COORDINATION
07/10/24 0929   Service Assessment   Patient Orientation Other (see comment)   Cognition Alert   History Provided By Child/Family  (pt's daughter Clair Fernandes)   Primary Caregiver Private caregiver   Support Systems Children;Home Care Staff   Patient's Healthcare Decision Maker is: Legal Next of Kin   PCP Verified by CM Yes   Prior Functional Level Assistance with the following:;Bathing;Toileting;Cooking;Housework;Shopping;Mobility   Current Functional Level Assistance with the following:;Bathing;Toileting;Mobility   Can patient return to prior living arrangement Yes   Ability to make needs known: Poor   Family able to assist with home care needs: Yes   Would you like for me to discuss the discharge plan with any other family members/significant others, and if so, who? Yes  (Clair Fernandes)   Financial Resources Medicare   Community Resources None   Social/Functional History   Lives With Other (comment)  (caregivers)   Type of Home House   Home Layout One level   Home Access Stairs to enter with rails   Entrance Stairs - Number of Steps 3   Entrance Stairs - Rails Right   Bathroom Shower/Tub Walk-in shower   Bathroom Toilet Standard   Bathroom Equipment Shower chair   Bathroom Accessibility Accessible   Home Equipment Walker - Rolling;Wheelchair - Manual;Cane   Receives Help From Family   ADL Assistance Needs assistance   Ambulation Assistance Needs assistance   Transfer Assistance Needs assistance   Active  No   Patient's  Info family   Mode of Transportation Car   Occupation Retired   Discharge Planning   Type of Residence House   Living Arrangements Other (Comment)  (caregivers)   Potential Assistance Needed Skilled Nursing Facility   DME Ordered? No   Potential Assistance Purchasing Medications No   Patient expects to be discharged to: Skilled nursing facility   Services At/After Discharge   Transition of Care Consult (CM Consult) SNF   Partner SNF Yes   Services At/After Discharge Skilled

## 2024-07-10 NOTE — CARE COORDINATION
Requested Case Management specialist to assist with transportation to [facility].  Address is [address] [place] [address]  [place] [id number] and phone number is [phone]  Patient will require BLS transport.   Pt requires Stretcher If stretcher, reason: altered mental status, moderate/severe left knee arthritis, right hand debridement and irrigation. Impaired mobility  Patient is currently requiring oxygen No   Height:5'8\"   Weight: 180 Ib  Pt is on isolation: No  Is the pt ready now? Yes  Requested time:   PCS Faxed: No  Insurance verified on face sheet: Yes  Auth needed for transport: No  CM completed PCS/ Envelope and placed on chart.         SRINI HooksN RN  Care Management

## 2024-07-10 NOTE — PROGRESS NOTES
[facility]  POST-ROUNDING NOTE    -Spoke to case management over the phone. They are unable to secure transport to [facility]. Discharge order rescinded. Will discharge patient in the a.m. and will continue to monitor post-op recovery over night    The above patient and plan were discussed with my supervising physician.     See daily progress note for full assessment/plan.      Omar Stark MD, PGY-1  [facility]  7/10/2024, 4:35 PM

## 2024-07-10 NOTE — PROGRESS NOTES
pain, and HA. Patient does not have her hearing aids today, her daughter who lives locally has them at home. When she can hear, patient responds appropriately to questions. Patient would like the splint and bandage to come off because she knits with her right hand despite being left hand dominant, but has been advised that these need to stay in place until they have been removed at post-op fu care.  Orthopedics PA Anita Beltran examined patient at bedside. She reports ortho can coordinate fu, regardless of whether the patient stays in the hospital or goes to [facility] for short-term care (she reports [facility] can transport pt to their clinic). Will discuss with Dr. Lee on rounds.    ROS (positive findings are in BOLD; negative findings are in regular font)  Constitutional: fevers, chills, appetite changes, weight changes, fatigue  HEENT: changes in vision, sore throat  Cardiovascular: chest pain, palpitations, edema  Pulmonary: SOB, cough  Gastrointestinal: abdominal pain, nausea/vomiting, diarrhea, constipation,  Genitourinary: dysuria, hesitation, dribbling, urgency  Musculoskeletal: arthralgias, myalgias  Skin: rash, itching  Neurological: sensory changes, motor changes, headache  Psychiatric: mood changes  Endocrine: heat/cold intolerance  Heme: easy bruising/easy bleeding, LAD      CURRENT INPATIENT MEDICATIONS:  Current Facility-Administered Medications   Medication Dose Route Frequency Provider Last Rate Last Admin    oxyCODONE (ROXICODONE) immediate release tablet 5 mg  5 mg Oral Q8H PRN Omar Stark MD   5 mg at 07/09/24 2059    acetaminophen (TYLENOL) tablet 650 mg  650 mg Oral 4 times per day Raina Trevino MD   650 mg at 07/10/24 0557    Or    acetaminophen (TYLENOL) suppository 650 mg  650 mg Rectal 4 times per day Raina Trevino MD        sodium chloride flush 0.9 % injection 5-40 mL  5-40 mL IntraVENous 2 times per day Hu Crocker MD   10 mL at 07/09/24 2047    sodium  chloride flush 0.9 % injection 5-40 mL  5-40 mL IntraVENous PRN Hu Crocker MD        0.9 % sodium chloride infusion   IntraVENous PRN Hu Crocker MD        potassium chloride (KLOR-CON M) extended release tablet 40 mEq  40 mEq Oral PRN Hu Crocker MD        Or    potassium bicarb-citric acid (EFFER-K) effervescent tablet 40 mEq  40 mEq Oral PRN Hu Crocker MD        Or    potassium chloride 10 mEq/100 mL IVPB (Peripheral Line)  10 mEq IntraVENous PRN Hu Crocker  mL/hr at 07/08/24 1417 10 mEq at 07/08/24 1417    magnesium sulfate 2000 mg in 50 mL IVPB premix  2,000 mg IntraVENous PRN Hu Crocker MD        ondansetron (ZOFRAN-ODT) disintegrating tablet 4 mg  4 mg Oral Q8H PRN Hu Crocker MD        Or    ondansetron (ZOFRAN) injection 4 mg  4 mg IntraVENous Q6H PRN Hu Crocker MD        polyethylene glycol (GLYCOLAX) packet 17 g  17 g Oral Daily PRN Hu Crocker MD        [Held by provider] heparin (porcine) injection 5,000 Units  5,000 Units SubCUTAneous 3 times per day Hu Crocker MD   5,000 Units at 07/08/24 1309       Allergies  No Known Allergies    OBJECTIVE:    Intake/Output Summary (Last 24 hours) at 7/10/2024 0747  Last data filed at 7/10/2024 0617  Gross per 24 hour   Intake 920 ml   Output 1500 ml   Net -580 ml       BP (!) 154/78   Pulse 71   Temp 97.6 °F (36.4 °C) (Oral)   Resp 19   Ht 1.727 m (5' 8\")   Wt 81.6 kg (180 lb)   SpO2 94%   BMI 27.37 kg/m²     PHYSICAL EXAM  Gen: NAD, comfortable. Patient does not have hearing aids today, but is appropriately answering questions.  HEENT: normocephalic, atraumatic, MMM, no thyromegaly, no JVD  CV: RRR, no murmurs or gallops appreciated on examination.  Pulm: CTAB, no wheezes, no crackles  Abd: S/NT/ND, no rebound, no guarding, no hepatosplenomegaly   MSK: no clubbing, no edema  Skin: warm, dry, intact, no rash. Some old scattered ecchymoses and dry skin to bl

## 2024-07-10 NOTE — PLAN OF CARE
Problem: Occupational Therapy - Adult  Goal: By Discharge: Performs self-care activities at highest level of function for planned discharge setting.  See evaluation for individualized goals.  Description: Occupational Therapy Goals:  Initiated 7/8/2024 to be met within 7-10 days.    1.  Patient will perform self-feeding with supervision/set-up.   2.  Patient will perform grooming with supervision/set-up.  3.  Patient will perform upper body dressing  with supervision/set-up.  4.  Patient will perform toilet transfers with minimal assistance/contact guard assist.  5.  Patient will perform all aspects of toileting with minimal assistance/contact guard assist.  6.  Patient will participate in upper extremity therapeutic exercise/activities with supervision/set-up for 8-10 minutes to increase strength/endurance for ADLs.    7.  Patient will utilize energy conservation techniques during functional activities with verbal cues.    PLOF: Pt lives alone, dtr lives next door. Pt lives in one level home, 3 FRANK w/ rail, walk in shower. Uses RW for mobility, is independent w/ ADLs.      7/10/2024 1707 by Dasia Gabriel, ASHUTOSH  Outcome: Progressing  OCCUPATIONAL THERAPY TREATMENT    Patient: Tara Fernandes (89 y.o. female)  Date: 7/10/2024  Diagnosis: Rhabdomyolysis [M62.82]  AMS (altered mental status) [R41.82] Rhabdomyolysis  Procedure(s) (LRB):  RIGHT HAND DEBRIDEMENT AND IRRIGATION; PRIMARY CLOSURE (Right) 1 Day Post-Op  Precautions: Fall Risk, General Precautions,  ,  ,  ,  ,  ,  ,      Chart, occupational therapy assessment, plan of care, and goals were reviewed.  ASSESSMENT: Patient lying semi-reclined in bed, no co pain, dtr and S-I-L present and provided max encouragement for pt's therapy. R hand in splint, noted digits edematous and impaired AROM. Dep to wash hands and pt tolerated gentle ROM to R hand digits with good tolerance. RUE elevated via pillow splinting x 2 for edema mgmt and comfort,. Dtr provided pt with  Poor      Pain:  Intensity Pre-treatment: 0/10   Intensity Post-treatment: 0/10        Activity Tolerance:    Activity Tolerance: Patient tolerated treatment well  Please refer to the flowsheet for vital signs taken during this treatment.  After treatment:   []  Patient left in no apparent distress sitting up in chair  [x]  Patient left in no apparent distress in bed  [x]  Call bell left within reach  [x]  Nursing notified  []  Caregiver present  [x]  Bed alarm activated    COMMUNICATION/EDUCATION:   Patient Education  Education Given To: Patient;Family  Education Provided: Role of Therapy;Plan of Care;ADL Adaptive Strategies;Energy Conservation  Education Method: Verbal;Teach Back  Barriers to Learning: None  Education Outcome: Continued education needed      Thank you for this referral.  Dasia Gabriel OT  Minutes: 25

## 2024-07-10 NOTE — PROGRESS NOTES
[facility] Family Medicine  DAILY PROGRESS NOTE      Patient:    Tara Fernandes , 89 y.o. female   MRN:  [id number]  Room/Bed:  519/01  Admission Date:   7/5/2024  Code status:  DNR    Reason for Admission: Rhabdomyolysis, GISSEL  Barriers to Discharge: Pending final orthopedic recommendations   Anticipated Date of Discharge: 7/10/2024 likely to [facility] (close to daughters residence)    ASSESSMENT AND PLAN  Tara Fernandes is a 89 y.o. year old female with PMH of a fall, arthritis, HTN, HLD and DVT admitted for Rhabdomyolysis [M62.82]  AMS (altered mental status) [R41.82].          Degloving injury of the right 2nd digit with exposed extensor tendon  -wound present on admission 7/5/2024  -right hand dressing present  -now-post op, VSS, labs stable, pain controlled  Plan  -Wound care following  -now post-op  -Tylenol 650 mg Q6h  -Oxicodone 5 mg Q8h PRN  -regular diet  -restarted SQH    Moderate/severe left knee arthritis  -7/9/2024 - Moderate to severe osteoarthritis with loose bodies and a moderate joint  Effusion  -Per family and patient left knee causing patient significant pain  Plan  -Ortho administered an intraarticular injection on 40 mg kenalog and 3 mls of 1% plan lidocaine into left knee at 8:17 PM 7/8/2024    Rhabdomyolysis  Elevated creatinine kinase  GISSEL, resolved  AMS  -Pt was brought to the ED after being found in her bathroom covered in feces.   -Daughter reports patient being confused and having general weakness.  -Laboratory results remarkable for 33 BUN and 2544 -> 3495 total CK. Suspect rhabdomyolysis and GISSEL.  -Low suspicion of a fall due to negative findings in head CT.  -Low suspicion of pneumonia and cardiac abnormalities due to negative findings in CXR, CBC and Troponin.  -UTI is probable due to small amount of leuk esterase found, but unlikely as Pt is asymptomatic.   -CK downtrending from peak 3495 to 504 7/8. Cr returned to baseline  Plan:  -continue 75 mL/hr LR  -trend CK and Cr  -hold  24 HOURS)  CT HEAD WO CONTRAST    Result Date: 7/6/2024  No clearly acute intracranial findings. Mild burden of periventricular low-attenuation, likely chronic microvascular ischemic change. Electronically signed by Josué Hillman    XR CHEST PORTABLE    Result Date: 7/5/2024  Probable small left pleural effusion. Electronically signed by Sunshine Maldonado     ================================================================  Further management for Ms. Tara Fernandes will be discussed on rounds with my attending.    Omar Stark MD, PGY-1  [facility]  July 10, 2024 8:03 AM

## 2024-07-10 NOTE — CARE COORDINATION
Left a message for pt's daughter Clair Fernandes [phone].    1455:  Pt's daughter Clair Fernandes called back.  She stated PTOT has not seen pt for a coupld of days now and she wants pt to be seen by PTOT.  She stated she spoke with the doctor.    Chart reviewed.  PT's last note was 7/7/24  Ot's last note was 7/8/24  Pt had procedure 7/9/24.    Called Dr. Mccurdy.  He stated he spoke with pt's daughter.  He is arranging for PTOT to see pt but should not hold discharge.    1528:  Per Dr. Mccurdy, he spoke with PTOT and they said their recommendation for SNF persists and they are unable to see her today per daughter's request.          Radha Ortiz, SRININ RN  Care Management

## 2024-07-10 NOTE — CARE COORDINATION
Earliest transport to [facility] today is 8pm.  Called Kenneth.  She stated 8pm is too late and pt will not be able to get her medications .  She is requesting for tomorrow morning transport.  Called Attending.  They are ok with pt transporting to [facility] tomorrow morning.  Updated Nurse Christina.          Radha Ortiz, SRININ RN  Care Management

## 2024-07-10 NOTE — DISCHARGE SUMMARY
[facility] Family Medicine  DISCHARGE SUMMARY      Name:   Tara Fernandes 89 y.o. female  MRN:   [id number]  CSN:   [id number]  Admission Date:  7/5/2024  Discharge Date:  7/11/2024  Attending:             Heber Lee MD   PCP:              Hayder Urena MD   ================================================================  Reason for Admission:  Rhabdomyolysis [M62.82]  AMS (altered mental status) [R41.82]    Discharge Diagnosis:    Rhabdomyolysis  GISSEL  Degloving injury right 2nd digit with exposed extensor tendon  Moderate/severe left knee arthritis    Follow-up studies/evaluations for PCP/Important Notes to PCP:  Ensure follow up with ortho: on 7/18/24  Consider POA documentation on follow up  Pending labs/investigations to follow up as below  Medication reconciliation:  Discontinued Medications: ASA, atorvastatin  New Medications: Amlodipine 2.5 mg daily     VIN Follow Up Appointment:   Follow-up Information       Follow up With Specialties Details Why Contact Info    Anita Beltran PA-C Physician Assistant Surgical Go in 7 day(s) 3:40 PM, 1040am, For wound re-check [address] [facility]  [address]  [facility] [address]      [place] [address]  [phone]             Readmission prevention plan:   Regular follow up  PT/OT at SNF    GOALS OF CARE (including Code Status, Advanced Care Plan):   DNR/DNI    Pending labs/ investigations at discharge to follow up:   none    Operative Procedures:   RIGHT HAND DEBRIDEMENT AND IRRIGATION; PRIMARY CLOSURE    Consultants:    Orthopedic surgery    Condition at discharge: Yes,     Disposition at Discharge:  Skilled nursing facility    Functional Status at Discharge: sit EOB     Diet: Regular diet    Discharge Medications:     Medication List        START taking these medications      amLODIPine 2.5 MG tablet  Commonly known as: NORVASC  Take 1 tablet by mouth daily         L3/L4. Trace  retrolisthesis of L1 on L2 and L2 on L3. Trace anterolisthesis of L3 on L4.    There is a subacute burst type fracture of the L1 vertebral body with greater  than 80% height loss centrally. There is a axillary oriented fracture line  extending to the posterior vertebral body wall. There is replacement of the  majority of the normal marrow fat within the vertebral body however there is  preservation of marrow fat posteriorly. There is paraspinal edema about the  vertebral body without definite evidence of paraspinal or epidural mass.    There is no evidence of diffuse marrow infiltrative process. There is multilevel  disc desiccation. Multiple Schmorl's nodes are present.  Modic type I degenerative endplate changes at L3/L4.    The conus medullaris terminates at L1/L2 and is normal in signal and morphology.    Moderate left and mild right psoas and iliopsoas muscle fatty atrophy. Moderate  lumbar paraspinal muscle atrophy. There is a 1.2 cm perineural cyst at the left  S2 level.    Multiple T2 hyperintense structures within the left and right renal pelvises,  [place] characterized but likely peripelvic cysts. There is a T2 hyperintense  structure within the interpolar region of the left kidney with extension into  the renal pelvis which is also incompletely characterized but probably reflects  a parapelvic cyst.    Limited assessment of the visualized soft tissues is otherwise unremarkable.    On sagittal sequences only:  T9-T10: Small disc bulge. Effacement of the thecal sac without significant  spinal canal stenosis. No significant foraminal stenosis.  T10-T11: Small central protrusion. Flavum hypertrophy. Effacement of the thecal  sac without significant spinal canal stenosis. There is no significant canal  stenosis. Mild bilateral foraminal stenosis.    Correlation of axial and sagittal images:    T11-T12:: Small disc bulge. Effacement of the thecal sac without significant  spinal canal stenosis. No

## 2024-07-10 NOTE — PLAN OF CARE
catalan left within reach  [x] Nursing notified  [] Caregiver present  [] Bed alarm activated  [] Chair alarm activated  [] SCDs applied    COMMUNICATION/EDUCATION:   Patient Education  Education Given To: Patient;Family  Education Provided: Role of Therapy;Plan of Care  Education Method: Demonstration;Verbal;Teach Back  Barriers to Learning: Cognition;Hearing  Education Outcome: Verbalized understanding;Demonstrated understanding;Continued education needed      Jemima Arzate, PT  Minutes: 15

## 2024-07-10 NOTE — CARE COORDINATION
Pt's clinicals sent to [facility] in [facility] for placement review.      1225:    [facility] accepted pt.  Spoke with Kenneth for [phone].  They can accept pt when ready for d/c.  She requested for pt's clinicals to be uploaded in [employer].    1232:    Per Attending, they are waiting for Ortho clearance and will let CM know when pt is ready.    1433:    Per Dr. Mccurdy, pt is ready for d/c    Radha Ortiz, SRININ RN  Care Management

## 2024-07-10 NOTE — CARE COORDINATION
07/10/24 1501   IMM Letter   IMM Letter given to Patient/Family/Significant other/Guardian/POA/by: Radha Ortiz   IMM Letter date given: 07/10/24   IMM Letter time given: 1501       Patient unable to understand and/or complete the \"Important Message from Medicare\". Reviewed document with patient's representative Clair Fernandes at phone number [phone] telephonically and addressed questions.  A copy of the IMM letter left at bedside with patient. Original, with verbal signature noted, placed in patient's chart.        Radha Ortiz, SRININ RN  Care Management

## 2024-07-11 VITALS
RESPIRATION RATE: 18 BRPM | TEMPERATURE: 97.8 F | BODY MASS INDEX: 27.28 KG/M2 | HEIGHT: 68 IN | SYSTOLIC BLOOD PRESSURE: 176 MMHG | HEART RATE: 76 BPM | OXYGEN SATURATION: 95 % | DIASTOLIC BLOOD PRESSURE: 74 MMHG | WEIGHT: 180 LBS

## 2024-07-11 LAB
ANION GAP SERPL CALC-SCNC: 8 MMOL/L (ref 3–18)
BASOPHILS # BLD: 0 K/UL (ref 0–0.1)
BASOPHILS NFR BLD: 1 % (ref 0–2)
BUN SERPL-MCNC: 18 MG/DL (ref 7–18)
BUN/CREAT SERPL: 25 (ref 12–20)
CALCIUM SERPL-MCNC: 8.5 MG/DL (ref 8.5–10.1)
CHLORIDE SERPL-SCNC: 110 MMOL/L (ref 100–111)
CO2 SERPL-SCNC: 23 MMOL/L (ref 21–32)
CREAT SERPL-MCNC: 0.71 MG/DL (ref 0.6–1.3)
DIFFERENTIAL METHOD BLD: ABNORMAL
EOSINOPHIL # BLD: 0.4 K/UL (ref 0–0.4)
EOSINOPHIL NFR BLD: 5 % (ref 0–5)
ERYTHROCYTE [DISTWIDTH] IN BLOOD BY AUTOMATED COUNT: 14.5 % (ref 11.6–14.5)
GLUCOSE SERPL-MCNC: 116 MG/DL (ref 74–99)
HCT VFR BLD AUTO: 35.2 % (ref 35–45)
HGB BLD-MCNC: 11.5 G/DL (ref 12–16)
IMM GRANULOCYTES # BLD AUTO: 0.1 K/UL (ref 0–0.04)
IMM GRANULOCYTES NFR BLD AUTO: 1 % (ref 0–0.5)
LYMPHOCYTES # BLD: 1.7 K/UL (ref 0.9–3.6)
LYMPHOCYTES NFR BLD: 22 % (ref 21–52)
MAGNESIUM SERPL-MCNC: 1.9 MG/DL (ref 1.6–2.6)
MCH RBC QN AUTO: 29.6 PG (ref 24–34)
MCHC RBC AUTO-ENTMCNC: 32.7 G/DL (ref 31–37)
MCV RBC AUTO: 90.5 FL (ref 78–100)
MONOCYTES # BLD: 0.6 K/UL (ref 0.05–1.2)
MONOCYTES NFR BLD: 8 % (ref 3–10)
NEUTS SEG # BLD: 4.9 K/UL (ref 1.8–8)
NEUTS SEG NFR BLD: 63 % (ref 40–73)
NRBC # BLD: 0 K/UL (ref 0–0.01)
NRBC BLD-RTO: 0 PER 100 WBC
PHOSPHATE SERPL-MCNC: 3.1 MG/DL (ref 2.5–4.9)
PLATELET # BLD AUTO: 382 K/UL (ref 135–420)
PMV BLD AUTO: 9.9 FL (ref 9.2–11.8)
POTASSIUM SERPL-SCNC: 4.1 MMOL/L (ref 3.5–5.5)
RBC # BLD AUTO: 3.89 M/UL (ref 4.2–5.3)
SODIUM SERPL-SCNC: 141 MMOL/L (ref 136–145)
WBC # BLD AUTO: 7.7 K/UL (ref 4.6–13.2)

## 2024-07-11 PROCEDURE — 6360000002 HC RX W HCPCS

## 2024-07-11 PROCEDURE — 80048 BASIC METABOLIC PNL TOTAL CA: CPT

## 2024-07-11 PROCEDURE — 6370000000 HC RX 637 (ALT 250 FOR IP)

## 2024-07-11 PROCEDURE — 99231 SBSQ HOSP IP/OBS SF/LOW 25: CPT | Performed by: ORTHOPAEDIC SURGERY

## 2024-07-11 PROCEDURE — 2580000003 HC RX 258

## 2024-07-11 PROCEDURE — 85025 COMPLETE CBC W/AUTO DIFF WBC: CPT

## 2024-07-11 PROCEDURE — 83735 ASSAY OF MAGNESIUM: CPT

## 2024-07-11 PROCEDURE — 84100 ASSAY OF PHOSPHORUS: CPT

## 2024-07-11 PROCEDURE — 36415 COLL VENOUS BLD VENIPUNCTURE: CPT

## 2024-07-11 RX ORDER — AMLODIPINE BESYLATE 2.5 MG/1
2.5 TABLET ORAL DAILY
Qty: 90 TABLET | Refills: 3 | Status: SHIPPED | OUTPATIENT
Start: 2024-07-11

## 2024-07-11 RX ADMIN — ACETAMINOPHEN 325MG 650 MG: 325 TABLET ORAL at 06:00

## 2024-07-11 RX ADMIN — SODIUM CHLORIDE, PRESERVATIVE FREE 10 ML: 5 INJECTION INTRAVENOUS at 10:20

## 2024-07-11 RX ADMIN — ACETAMINOPHEN 325MG 650 MG: 325 TABLET ORAL at 11:50

## 2024-07-11 RX ADMIN — HEPARIN SODIUM 5000 UNITS: 5000 INJECTION INTRAVENOUS; SUBCUTANEOUS at 06:00

## 2024-07-11 ASSESSMENT — PAIN DESCRIPTION - LOCATION: LOCATION: BACK

## 2024-07-11 ASSESSMENT — PAIN SCALES - GENERAL
PAINLEVEL_OUTOF10: 2
PAINLEVEL_OUTOF10: 0
PAINLEVEL_OUTOF10: 2
PAINLEVEL_OUTOF10: 0

## 2024-07-11 ASSESSMENT — PAIN DESCRIPTION - ORIENTATION: ORIENTATION: MID

## 2024-07-11 ASSESSMENT — PAIN SCALES - WONG BAKER: WONGBAKER_NUMERICALRESPONSE: HURTS A LITTLE BIT

## 2024-07-11 ASSESSMENT — PAIN - FUNCTIONAL ASSESSMENT: PAIN_FUNCTIONAL_ASSESSMENT: ACTIVITIES ARE NOT PREVENTED

## 2024-07-11 ASSESSMENT — PAIN DESCRIPTION - DESCRIPTORS: DESCRIPTORS: DISCOMFORT

## 2024-07-11 NOTE — PROGRESS NOTES
Attempted to give report to the nurse at [facility].  Nobody was available, left a voice message with my call back number.

## 2024-07-11 NOTE — CARE COORDINATION
Chart reviewed.  Transport has been arranged with [facility] to  pt at 12 noon today.    PCS form faxed to [facility] as requested.  Attending informed of transport time.  Pt's nurse aware.  Notified Alexus fiore [facility] [phone].  Pt's daughter aware.    1235:    Pt left the floor with medical transport and pt's daughter    SRINI HooksN RN  Care Management

## 2024-07-11 NOTE — PROGRESS NOTES
[facility] Medicine  DAILY PROGRESS NOTE      Patient:    Tara Fernandes , 89 y.o. female   MRN:  [id number]  Room/Bed:  519/01  Admission Date:   7/5/2024  Code status:  DNR    Reason for Admission: Rhabdomyolysis, GISSEL  Barriers to Discharge: None  Anticipated Date of Discharge: 7/11/2024 likely to [facility]    ASSESSMENT AND PLAN  Tara Fernandes is a 89 y.o. year old female with PMH of a fall, arthritis, HTN, HLD and DVT admitted for Rhabdomyolysis [M62.82]  AMS (altered mental status) [R41.82].          Degloving injury of the right 2nd digit with exposed extensor tendon  -wound present on admission 7/5/2024  -right hand dressing present  -now-post op, VSS, labs stable, pain controlled  Plan  -Wound care following  -now post-op  -Tylenol 650 mg Q6h  -Oxicodone 5 mg Q8h PRN  -regular diet  -restarted SQH    Moderate/severe left knee arthritis  -7/9/2024 - Moderate to severe osteoarthritis with loose bodies and a moderate joint  Effusion  -Per family and patient left knee causing patient significant pain  Plan  -Ortho administered an intraarticular injection on 40 mg kenalog and 3 mls of 1% plan lidocaine into left knee at 8:17 PM 7/8/2024    Rhabdomyolysis  Elevated creatinine kinase  GISSEL, resolved  AMS  -Pt was brought to the ED after being found in her bathroom covered in feces.   -Daughter reports patient being confused and having general weakness.  -Laboratory results remarkable for 33 BUN and 2544 -> 3495 total CK. Suspect rhabdomyolysis and GISSEL.  -Low suspicion of a fall due to negative findings in head CT.  -Low suspicion of pneumonia and cardiac abnormalities due to negative findings in CXR, CBC and Troponin.  -UTI is probable due to small amount of leuk esterase found, but unlikely as Pt is asymptomatic.   -CK downtrending from peak 3495 to 504 7/8. Cr returned to baseline  Plan:  -continue 75 mL/hr LR  -trend CK and Cr  -hold atorvastatin  -daily BMP    History of DVT  -History of multiple DVTs     Or    acetaminophen (TYLENOL) suppository 650 mg  650 mg Rectal 4 times per day Raina Trevino MD        sodium chloride flush 0.9 % injection 5-40 mL  5-40 mL IntraVENous 2 times per day Hu Crocker MD   10 mL at 07/10/24 2117    sodium chloride flush 0.9 % injection 5-40 mL  5-40 mL IntraVENous PRN Hu Crocker MD        0.9 % sodium chloride infusion   IntraVENous PRN Hu Crocker MD        potassium chloride (KLOR-CON M) extended release tablet 40 mEq  40 mEq Oral PRN Hu Crocker MD        Or    potassium bicarb-citric acid (EFFER-K) effervescent tablet 40 mEq  40 mEq Oral PRN Hu Crocker MD        Or    potassium chloride 10 mEq/100 mL IVPB (Peripheral Line)  10 mEq IntraVENous PRN Hu Crocker  mL/hr at 07/08/24 1417 10 mEq at 07/08/24 1417    magnesium sulfate 2000 mg in 50 mL IVPB premix  2,000 mg IntraVENous PRN Hu Crocker MD        ondansetron (ZOFRAN-ODT) disintegrating tablet 4 mg  4 mg Oral Q8H PRN Hu Crocker MD        Or    ondansetron (ZOFRAN) injection 4 mg  4 mg IntraVENous Q6H PRN Hu Crocker MD        polyethylene glycol (GLYCOLAX) packet 17 g  17 g Oral Daily PRN Hu Crocker MD        heparin (porcine) injection 5,000 Units  5,000 Units SubCUTAneous 3 times per day Hu Crocker MD   5,000 Units at 07/11/24 0600     OBJECTIVE:    Intake/Output Summary (Last 24 hours) at 7/11/2024 0854  Last data filed at 7/11/2024 0556  Gross per 24 hour   Intake 360 ml   Output 1300 ml   Net -940 ml       BP (!) 157/75   Pulse 58   Temp 97.9 °F (36.6 °C) (Oral)   Resp 18   Ht 1.727 m (5' 8\")   Wt 81.6 kg (180 lb)   SpO2 93%   BMI 27.37 kg/m²     PHYSICAL EXAM  Gen: Calm, NAD  HEENT: normocephalic, atraumatic, MMM, no thyromegaly, no JVD  CV: RRR  Pulm: CTAB, no wheezes, no crackles  Abd: S/NT/ND, no rebound, no guarding  MSK: no clubbing, no edema, right hand dressing c/d/I  Skin: warm, dry,

## 2024-07-11 NOTE — PROGRESS NOTES
[facility]  DAILY PROGRESS NOTE  MEDICAL STUDENT NOTE FOR LEARNING PURPOSES ONLY      *ATTENTION:  This note has been created by a medical student for educational purposes only.  Please do not refer to the content of this note for clinical decision-making, billing, or other purposes.  Please see attending physician’s note to obtain clinical information on this patient.*         Patient:    Tara Fernandes , 89 y.o. female   MRN:  [id number]  Room/Bed:  [facility]/  Admission Date:   7/5/2024  Code status:  DNR    Reason for Admission: GISSEL, Rhabdomyolysis, AMS, degloving injury to R 2nd digit with exposed extensor tendon  Barriers to Discharge: Transport to [facility]  Expected date of discharge: 07/11/24    ASSESSMENT AND PLAN:   aTra Fernandes is a 89 y.o. F with hx of arthritis, HTN, HLD, and DVT admitted for GISSEL, AMS, and Rhabdomyolysis.    Degloving injury to 2nd digit of RUE with exposed extensor tendon  -wound present on admission 7/5/24  -dressing is present, pt is able to make a fist and has +2 cap refill.  Plan:  -Wound care and PT fu at [facility]  -Tylenol 650 mg Q6h for pain control  -regular diet  -anticoag regimen of SQH started yesterday  -Pt has fu scheduled with ortho for 7/18    Moderate/severe left knee OA  -7/9/24: pt was experiencing left knee pain, was seen by ortho   Plan:  -Administered 40 mg kenalog and 3mL 1% plain lidocaine into L knee on 7/8/24 by ortho    Rhabdomyolysis  GISSEL  AMS  -Pt initially brought to ED after being found on toilet by daughter, presented with confusion  -Initial labs showed 33 BUN and CK 2544 -> 3495. Negative Head CT. UTI had small amount of leukocyte esterase, but pt asymptomatic and denies urinary symptoms.  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 No pain, swelling, or TTP on exam today.   Plan:  -SQH restarted  reportedly represents the right hand per discussion with the technologists (which would fit the orientation of the hand based on the images). Recommend obtaining new radiographs of the left hand if there is persistent concern for pathology in the left hand. Electronically signed by Orion Stewart    Result Date: 7/9/2024  Severe degenerative disease at the triscaphe, first CMC and second MCP joints. This likely represents a combination of osteoarthritis and CPPD arthropathy with associated chondrocalcinosis at the second MCP joint and the TFCC. Electronically signed by Orion Stewart    XR TIBIA FIBULA LEFT (2 VIEWS)    Result Date: 7/9/2024  1.  No acute fracture identified. 2.  Diffuse osteopenia. Electronically signed by Glenn DUFFY Post    XR FEMUR LEFT (MIN 2 VIEWS)    Result Date: 7/9/2024  1.  No acute pathology appreciated in the left femur. Electronically signed by Glenn DUFFY Post    XR KNEE LEFT (1-2 VIEWS)    Result Date: 7/9/2024  1.  No fracture or dislocation. 2.  Moderate to severe osteoarthritis with loose bodies and a moderate joint effusion. Electronically signed by Glenn DUFFY Post    CT HEAD WO CONTRAST    Result Date: 7/6/2024  No clearly acute intracranial findings. Mild burden of periventricular low-attenuation, likely chronic microvascular ischemic change. Electronically signed by Josué Hillman    XR CHEST PORTABLE    Result Date: 7/5/2024  Probable small left pleural effusion. Electronically signed by Sunshine Maldonado       ================================================================  Further management for Ms. Tara Fernandes will be discussed on rounds with my attending.      Peyman Geronimo, M3  [facility]  July 11, 2024 6:41 AM

## 2024-07-11 NOTE — PLAN OF CARE
with or without assistive devices     Problem: Infection - Adult  Goal: Absence of infection at discharge  Outcome: Adequate for Discharge  Goal: Absence of infection during hospitalization  Outcome: Adequate for Discharge     Problem: Pain  Goal: Verbalizes/displays adequate comfort level or baseline comfort level  Outcome: Adequate for Discharge     Problem: Discharge Planning  Goal: Discharge to home or other facility with appropriate resources  Outcome: Adequate for Discharge     Problem: Safety - Adult  Goal: Free from fall injury  Outcome: Adequate for Discharge     Problem: Skin/Tissue Integrity  Goal: Absence of new skin breakdown  Description: 1.  Monitor for areas of redness and/or skin breakdown  2.  Assess vascular access sites hourly  3.  Every 4-6 hours minimum:  Change oxygen saturation probe site  4.  Every 4-6 hours:  If on nasal continuous positive airway pressure, respiratory therapy assess nares and determine need for appliance change or resting period.  Outcome: Adequate for Discharge     Problem: Neurosensory - Adult  Goal: Achieves stable or improved neurological status  Outcome: Adequate for Discharge     Problem: Cardiovascular - Adult  Goal: Maintains optimal cardiac output and hemodynamic stability  Outcome: Adequate for Discharge     Problem: Skin/Tissue Integrity - Adult  Goal: Skin integrity remains intact  Outcome: Adequate for Discharge  Flowsheets (Taken 7/11/2024 0904)  Skin Integrity Remains Intact: Monitor for areas of redness and/or skin breakdown  Goal: Incisions, wounds, or drain sites healing without S/S of infection  Outcome: Adequate for Discharge  Flowsheets (Taken 7/11/2024 0904)  Incisions, Wounds, or Drain Sites Healing Without Sign and Symptoms of Infection: ADMISSION and DAILY: Assess and document risk factors for pressure ulcer development  Goal: Oral mucous membranes remain intact  Outcome: Adequate for Discharge  Flowsheets (Taken 7/11/2024 0904)  Oral Mucous

## 2024-07-15 ENCOUNTER — TELEPHONE (OUTPATIENT)
Age: 89
End: 2024-07-15

## 2024-07-15 NOTE — TELEPHONE ENCOUNTER
Patients daughter, Clair, has concerns about patient being ambulatory for upcoming appointment scheduled for 7/18.  Patient is residing at [facility] in [place] ([id number]) and Clair says she was transported to [facility] via ambulance after recent surgery and she hasn't even left the bed yet.  Can the 7/18 appointment be postponed or is it possible that we could send orders to [facility] if immediate care is required.   Please review and advise Clair back at [phone].  [facility] phone number is [phone].

## 2024-07-18 ENCOUNTER — OFFICE VISIT (OUTPATIENT)
Age: 89
End: 2024-07-18

## 2024-07-18 VITALS — WEIGHT: 180 LBS | HEIGHT: 68 IN | BODY MASS INDEX: 27.28 KG/M2

## 2024-07-18 DIAGNOSIS — S61.401D: ICD-10-CM

## 2024-07-18 DIAGNOSIS — Z98.890 POST-OPERATIVE STATE: ICD-10-CM

## 2024-07-18 DIAGNOSIS — Z98.890 S/P DEBRIDEMENT: ICD-10-CM

## 2024-07-18 DIAGNOSIS — M65.332 TRIGGER FINGER, LEFT MIDDLE FINGER: Primary | ICD-10-CM

## 2024-07-18 RX ORDER — LIDOCAINE HYDROCHLORIDE 10 MG/ML
0.5 INJECTION, SOLUTION INFILTRATION; PERINEURAL ONCE
Status: COMPLETED | OUTPATIENT
Start: 2024-07-18 | End: 2024-07-18

## 2024-07-18 RX ADMIN — LIDOCAINE HYDROCHLORIDE 0.5 ML: 10 INJECTION, SOLUTION INFILTRATION; PERINEURAL at 12:04

## 2024-07-18 NOTE — PROGRESS NOTES
SHEATH/LIGAMENT    triamcinolone acetonide (KENALOG) injection 5 mg    lidocaine 1 % injection 0.5 mL      2. Degloving injury of hand, right, subsequent encounter  DME Order for (Specify) as OP    External Referral To Occupational Therapy      3. S/P debridement  DME Order for (Specify) as OP    External Referral To Occupational Therapy      4. Post-operative state  DME Order for (Specify) as OP    External Referral To Occupational Therapy            Plan:     1. Trigger finger, left middle finger  Injection today     2. Degloving injury of hand, right, subsequent encounter  3. S/P debridement  4. Post-operative state  Wound care: antibacterial soap and water at least twice daily. No submersion. Monitor for infection. Recommend leaving open to air as much as possible but may cover for comfort if brace is rubbing  Universal wrist brace given to avoid flexion and extension of the wrist and subsequent tension on the wound  1 suture removed today, however the remaining were left intact and will removed in 1 week  OT to assist with gentle digit ROM and adaptive techniques    My note will be faxed to [facility] and [facility] Dr. Urena     Plan was reviewed with patient, who verbalized agreement and understanding of the plan.     Return in about 1 week (around 7/25/2024) for Right hand suture removal, Post op.     Anita Beltran PA-C  7/18/2024 12:32 PM    Given that the patient has a concern outside of her post-op concerns, at least 10 minutes was spent in chart review, review of any pertinent diagnostic testing, clinical examination, discussion of treatment options, performing any indicated procedures, coordination of care, and documentation.    [facility]  [address] [place] [address]. [address]  [facility] [id number]   OFFICE PROCEDURE PROGRESS NOTE        Chart reviewed for the following:   IAnita PA-C, have reviewed the History, Physical and updated

## 2024-07-25 ENCOUNTER — OFFICE VISIT (OUTPATIENT)
Age: 89
End: 2024-07-25
Payer: MEDICARE

## 2024-07-25 VITALS — WEIGHT: 180 LBS | HEIGHT: 68 IN | BODY MASS INDEX: 27.28 KG/M2

## 2024-07-25 DIAGNOSIS — Z48.02 VISIT FOR SUTURE REMOVAL: Primary | ICD-10-CM

## 2024-07-25 DIAGNOSIS — Z98.890 POST-OPERATIVE STATE: ICD-10-CM

## 2024-07-25 DIAGNOSIS — M65.332 TRIGGER FINGER, LEFT MIDDLE FINGER: ICD-10-CM

## 2024-07-25 DIAGNOSIS — M19.011 OSTEOARTHRITIS OF RIGHT SHOULDER, UNSPECIFIED OSTEOARTHRITIS TYPE: ICD-10-CM

## 2024-07-25 DIAGNOSIS — S61.401D: ICD-10-CM

## 2024-07-25 PROCEDURE — G8427 DOCREV CUR MEDS BY ELIG CLIN: HCPCS

## 2024-07-25 PROCEDURE — 1111F DSCHRG MED/CURRENT MED MERGE: CPT

## 2024-07-25 PROCEDURE — 1036F TOBACCO NON-USER: CPT

## 2024-07-25 PROCEDURE — G8419 CALC BMI OUT NRM PARAM NOF/U: HCPCS

## 2024-07-25 PROCEDURE — 1090F PRES/ABSN URINE INCON ASSESS: CPT

## 2024-07-25 PROCEDURE — 99212 OFFICE O/P EST SF 10 MIN: CPT

## 2024-07-25 PROCEDURE — 1123F ACP DISCUSS/DSCN MKR DOCD: CPT

## 2024-07-25 NOTE — PROGRESS NOTES
Tara Fernandes is a 89 y.o. female left handed, retiree. Resides at [facility] currently.  Worker's Compensation and legal considerations: none    Chief Complaint   Patient presents with    Post-Op Check     Right hand suture removal     Pain Score:   5    Subjective:     7/25/2024 HPI: Patient presents today accompanied by her daughter for suture removal of her right hand.  Her daughter brings up concerns regarding her right shoulder as therapy has been working with her at the facility but the patient is having significant right shoulder pain which is interfering with her ROM.  Left middle finger is continuing to trigger.    Initial HPI (7/18/2024): Patient presents today accompanied by her daughter, Clair, with a concern of left middle trigger finger as well as for a postoperative visit now approximately 2 weeks status post right hand debridement and irrigation with primary closure after degloving injury.  Still unsure how the degloving injury occurred however family suspects it happened when paramedics were transporting her from her home. She was admitted for rhabdomyolysis subsequently.    Injury: Yes: Comment: degloving injury to dorsal hand  Prior Treatment:  debridement & closure right hand 7/9/2024  Contributory history: n/a    ROS: Review of Systems - General ROS: negative except HPI    Past Medical History:   Diagnosis Date    Essential hypertension     Hyperlipidemia     Other premature beats 5/13/2013     3.  11,437 single ve's, 16 paired. Bigeminy and trigeminy noted. Approximately 15% o pvc's on ekg.asymptomatic potassium normal check echo for lv function        Past Surgical History:   Procedure Laterality Date    ARM SURGERY Right 7/9/2024    RIGHT HAND DEBRIDEMENT AND IRRIGATION; PRIMARY CLOSURE performed by Berhane Vargas DO at [facility] MAIN OR        Current Outpatient Medications   Medication Sig Dispense Refill    amLODIPine (NORVASC) 2.5 MG tablet Take 1 tablet by mouth daily 90 tablet 3

## (undated) DEVICE — GOWN,PREVENTION PLUS,2XL,ST,22/CS: Brand: MEDLINE

## (undated) DEVICE — 450 ML BOTTLE OF 0.05% CHLORHEXIDINE GLUCONATE IN 99.95% STERILE WATER FOR IRRIGATION, USP AND APPLICATOR.: Brand: IRRISEPT ANTIMICROBIAL WOUND LAVAGE

## (undated) DEVICE — DRAPE,HAND,STERILE: Brand: MEDLINE

## (undated) DEVICE — THREE-QUARTER SHEET: Brand: CONVERTORS

## (undated) DEVICE — SOLUTION IRRIG 1000ML 0.9% SOD CHL USP POUR PLAS BTL

## (undated) DEVICE — CORD ES L12FT BPLR FRCP

## (undated) DEVICE — BANDAGE COMPR EXSANGUATION SGL LAYERED NO CLSR 9FT LEN 4IN W

## (undated) DEVICE — STERILE POLYISOPRENE POWDER-FREE SURGICAL GLOVES: Brand: PROTEXIS

## (undated) DEVICE — PACK PROCEDURE SURG MAJ W/ BASIN LF

## (undated) DEVICE — NEEDLE 25GA X 1.5 IN ECLIPSE

## (undated) DEVICE — SUTURE ETHILON SZ 4-0 L18IN NONABSORBABLE BLK L19MM PC-5 3/8 1894G

## (undated) DEVICE — PAD,NON-ADHERENT,3X8,STERILE,LF,1/PK: Brand: MEDLINE

## (undated) DEVICE — DRESSING,GAUZE,XEROFORM,CURAD,1"X8",ST: Brand: CURAD

## (undated) DEVICE — PADDING CAST W4INXL4YD ST COT COHESIVE HND TEARABLE SPEC

## (undated) DEVICE — GAUZE,SPONGE,4"X4",16PLY,STRL,LF,10/TRAY: Brand: MEDLINE

## (undated) DEVICE — PAD,ABDOMINAL,8"X10",ST,LF: Brand: MEDLINE